# Patient Record
Sex: FEMALE | Race: BLACK OR AFRICAN AMERICAN | NOT HISPANIC OR LATINO | URBAN - METROPOLITAN AREA
[De-identification: names, ages, dates, MRNs, and addresses within clinical notes are randomized per-mention and may not be internally consistent; named-entity substitution may affect disease eponyms.]

---

## 2018-10-31 ENCOUNTER — INPATIENT (INPATIENT)
Facility: HOSPITAL | Age: 63
LOS: 3 days | Discharge: HOME | End: 2018-11-04
Attending: STUDENT IN AN ORGANIZED HEALTH CARE EDUCATION/TRAINING PROGRAM | Admitting: STUDENT IN AN ORGANIZED HEALTH CARE EDUCATION/TRAINING PROGRAM
Payer: COMMERCIAL

## 2018-10-31 VITALS
TEMPERATURE: 98 F | DIASTOLIC BLOOD PRESSURE: 59 MMHG | SYSTOLIC BLOOD PRESSURE: 124 MMHG | RESPIRATION RATE: 17 BRPM | OXYGEN SATURATION: 98 % | HEART RATE: 107 BPM

## 2018-10-31 DIAGNOSIS — K57.20 DIVERTICULITIS OF LARGE INTESTINE WITH PERFORATION AND ABSCESS WITHOUT BLEEDING: ICD-10-CM

## 2018-10-31 DIAGNOSIS — D25.9 LEIOMYOMA OF UTERUS, UNSPECIFIED: Chronic | ICD-10-CM

## 2018-10-31 PROBLEM — Z00.00 ENCOUNTER FOR PREVENTIVE HEALTH EXAMINATION: Status: ACTIVE | Noted: 2018-10-31

## 2018-10-31 LAB
ALBUMIN SERPL ELPH-MCNC: 3.5 G/DL — SIGNIFICANT CHANGE UP (ref 3.3–5)
ALP SERPL-CCNC: 77 U/L — SIGNIFICANT CHANGE UP (ref 40–120)
ALT FLD-CCNC: 18 U/L — SIGNIFICANT CHANGE UP (ref 4–33)
APPEARANCE UR: CLEAR — SIGNIFICANT CHANGE UP
APTT BLD: 27.9 SEC — SIGNIFICANT CHANGE UP (ref 27.5–36.3)
AST SERPL-CCNC: 22 U/L — SIGNIFICANT CHANGE UP (ref 4–32)
BASE EXCESS BLDV CALC-SCNC: 3.2 MMOL/L — SIGNIFICANT CHANGE UP
BASOPHILS # BLD AUTO: 0.07 K/UL — SIGNIFICANT CHANGE UP (ref 0–0.2)
BASOPHILS NFR BLD AUTO: 0.5 % — SIGNIFICANT CHANGE UP (ref 0–2)
BILIRUB SERPL-MCNC: 0.6 MG/DL — SIGNIFICANT CHANGE UP (ref 0.2–1.2)
BILIRUB UR-MCNC: NEGATIVE — SIGNIFICANT CHANGE UP
BLD GP AB SCN SERPL QL: NEGATIVE — SIGNIFICANT CHANGE UP
BLOOD GAS VENOUS - CREATININE: 0.99 MG/DL — SIGNIFICANT CHANGE UP (ref 0.5–1.3)
BLOOD UR QL VISUAL: SIGNIFICANT CHANGE UP
BUN SERPL-MCNC: 11 MG/DL — SIGNIFICANT CHANGE UP (ref 7–23)
CALCIUM SERPL-MCNC: 9.5 MG/DL — SIGNIFICANT CHANGE UP (ref 8.4–10.5)
CHLORIDE BLDV-SCNC: 97 MMOL/L — SIGNIFICANT CHANGE UP (ref 96–108)
CHLORIDE SERPL-SCNC: 92 MMOL/L — LOW (ref 98–107)
CO2 SERPL-SCNC: 25 MMOL/L — SIGNIFICANT CHANGE UP (ref 22–31)
COLOR SPEC: SIGNIFICANT CHANGE UP
CREAT SERPL-MCNC: 1.01 MG/DL — SIGNIFICANT CHANGE UP (ref 0.5–1.3)
EOSINOPHIL # BLD AUTO: 0.03 K/UL — SIGNIFICANT CHANGE UP (ref 0–0.5)
EOSINOPHIL NFR BLD AUTO: 0.2 % — SIGNIFICANT CHANGE UP (ref 0–6)
GAS PNL BLDV: 134 MMOL/L — LOW (ref 136–146)
GLUCOSE BLDV-MCNC: 105 — HIGH (ref 70–99)
GLUCOSE SERPL-MCNC: 100 MG/DL — HIGH (ref 70–99)
GLUCOSE UR-MCNC: NEGATIVE — SIGNIFICANT CHANGE UP
HCO3 BLDV-SCNC: 27 MMOL/L — SIGNIFICANT CHANGE UP (ref 20–27)
HCT VFR BLD CALC: 31 % — LOW (ref 34.5–45)
HCT VFR BLDV CALC: 30.9 % — LOW (ref 34.5–45)
HGB BLD-MCNC: 9.8 G/DL — LOW (ref 11.5–15.5)
HGB BLDV-MCNC: 10 G/DL — LOW (ref 11.5–15.5)
IMM GRANULOCYTES # BLD AUTO: 0.11 # — SIGNIFICANT CHANGE UP
IMM GRANULOCYTES NFR BLD AUTO: 0.8 % — SIGNIFICANT CHANGE UP (ref 0–1.5)
INR BLD: 1.26 — HIGH (ref 0.88–1.17)
KETONES UR-MCNC: SIGNIFICANT CHANGE UP
LACTATE BLDV-MCNC: 1.7 MMOL/L — SIGNIFICANT CHANGE UP (ref 0.5–2)
LEUKOCYTE ESTERASE UR-ACNC: NEGATIVE — SIGNIFICANT CHANGE UP
LYMPHOCYTES # BLD AUTO: 1.9 K/UL — SIGNIFICANT CHANGE UP (ref 1–3.3)
LYMPHOCYTES # BLD AUTO: 14.4 % — SIGNIFICANT CHANGE UP (ref 13–44)
MCHC RBC-ENTMCNC: 26.3 PG — LOW (ref 27–34)
MCHC RBC-ENTMCNC: 31.6 % — LOW (ref 32–36)
MCV RBC AUTO: 83.1 FL — SIGNIFICANT CHANGE UP (ref 80–100)
MONOCYTES # BLD AUTO: 1.42 K/UL — HIGH (ref 0–0.9)
MONOCYTES NFR BLD AUTO: 10.8 % — SIGNIFICANT CHANGE UP (ref 2–14)
NEUTROPHILS # BLD AUTO: 9.67 K/UL — HIGH (ref 1.8–7.4)
NEUTROPHILS NFR BLD AUTO: 73.3 % — SIGNIFICANT CHANGE UP (ref 43–77)
NITRITE UR-MCNC: NEGATIVE — SIGNIFICANT CHANGE UP
NRBC # FLD: 0 — SIGNIFICANT CHANGE UP
PCO2 BLDV: 43 MMHG — SIGNIFICANT CHANGE UP (ref 41–51)
PH BLDV: 7.42 PH — SIGNIFICANT CHANGE UP (ref 7.32–7.43)
PH UR: 7 — SIGNIFICANT CHANGE UP (ref 5–8)
PLATELET # BLD AUTO: 393 K/UL — SIGNIFICANT CHANGE UP (ref 150–400)
PMV BLD: 9.4 FL — SIGNIFICANT CHANGE UP (ref 7–13)
PO2 BLDV: 44 MMHG — HIGH (ref 35–40)
POTASSIUM BLDV-SCNC: 3.3 MMOL/L — LOW (ref 3.4–4.5)
POTASSIUM SERPL-MCNC: 4 MMOL/L — SIGNIFICANT CHANGE UP (ref 3.5–5.3)
POTASSIUM SERPL-SCNC: 4 MMOL/L — SIGNIFICANT CHANGE UP (ref 3.5–5.3)
PROT SERPL-MCNC: 8.1 G/DL — SIGNIFICANT CHANGE UP (ref 6–8.3)
PROT UR-MCNC: NEGATIVE — SIGNIFICANT CHANGE UP
PROTHROM AB SERPL-ACNC: 14.1 SEC — HIGH (ref 9.8–13.1)
RBC # BLD: 3.73 M/UL — LOW (ref 3.8–5.2)
RBC # FLD: 15 % — HIGH (ref 10.3–14.5)
RH IG SCN BLD-IMP: POSITIVE — SIGNIFICANT CHANGE UP
SAO2 % BLDV: 75.2 % — SIGNIFICANT CHANGE UP (ref 60–85)
SODIUM SERPL-SCNC: 135 MMOL/L — SIGNIFICANT CHANGE UP (ref 135–145)
SP GR SPEC: 1.01 — SIGNIFICANT CHANGE UP (ref 1–1.04)
UROBILINOGEN FLD QL: NORMAL — SIGNIFICANT CHANGE UP
WBC # BLD: 13.2 K/UL — HIGH (ref 3.8–10.5)
WBC # FLD AUTO: 13.2 K/UL — HIGH (ref 3.8–10.5)

## 2018-10-31 RX ORDER — SODIUM CHLORIDE 9 MG/ML
1000 INJECTION, SOLUTION INTRAVENOUS
Qty: 0 | Refills: 0 | Status: DISCONTINUED | OUTPATIENT
Start: 2018-10-31 | End: 2018-11-01

## 2018-10-31 RX ORDER — METOPROLOL TARTRATE 50 MG
5 TABLET ORAL EVERY 6 HOURS
Qty: 0 | Refills: 0 | Status: DISCONTINUED | OUTPATIENT
Start: 2018-10-31 | End: 2018-11-03

## 2018-10-31 RX ORDER — CIPROFLOXACIN LACTATE 400MG/40ML
400 VIAL (ML) INTRAVENOUS ONCE
Qty: 0 | Refills: 0 | Status: COMPLETED | OUTPATIENT
Start: 2018-10-31 | End: 2018-10-31

## 2018-10-31 RX ORDER — PIPERACILLIN AND TAZOBACTAM 4; .5 G/20ML; G/20ML
3.38 INJECTION, POWDER, LYOPHILIZED, FOR SOLUTION INTRAVENOUS EVERY 8 HOURS
Qty: 0 | Refills: 0 | Status: DISCONTINUED | OUTPATIENT
Start: 2018-10-31 | End: 2018-11-04

## 2018-10-31 RX ORDER — METRONIDAZOLE 500 MG
500 TABLET ORAL ONCE
Qty: 0 | Refills: 0 | Status: COMPLETED | OUTPATIENT
Start: 2018-10-31 | End: 2018-10-31

## 2018-10-31 RX ORDER — ENOXAPARIN SODIUM 100 MG/ML
40 INJECTION SUBCUTANEOUS DAILY
Qty: 0 | Refills: 0 | Status: DISCONTINUED | OUTPATIENT
Start: 2018-10-31 | End: 2018-11-04

## 2018-10-31 RX ORDER — ACETAMINOPHEN 500 MG
1000 TABLET ORAL ONCE
Qty: 0 | Refills: 0 | Status: COMPLETED | OUTPATIENT
Start: 2018-10-31 | End: 2018-10-31

## 2018-10-31 RX ORDER — SODIUM CHLORIDE 9 MG/ML
1000 INJECTION INTRAMUSCULAR; INTRAVENOUS; SUBCUTANEOUS ONCE
Qty: 0 | Refills: 0 | Status: COMPLETED | OUTPATIENT
Start: 2018-10-31 | End: 2018-10-31

## 2018-10-31 RX ADMIN — SODIUM CHLORIDE 1000 MILLILITER(S): 9 INJECTION INTRAMUSCULAR; INTRAVENOUS; SUBCUTANEOUS at 18:39

## 2018-10-31 RX ADMIN — Medication 100 MILLIGRAM(S): at 20:02

## 2018-10-31 RX ADMIN — Medication 5 MILLIGRAM(S): at 23:56

## 2018-10-31 RX ADMIN — Medication 1000 MILLIGRAM(S): at 23:40

## 2018-10-31 RX ADMIN — SODIUM CHLORIDE 125 MILLILITER(S): 9 INJECTION, SOLUTION INTRAVENOUS at 23:55

## 2018-10-31 RX ADMIN — Medication 400 MILLIGRAM(S): at 23:15

## 2018-10-31 RX ADMIN — PIPERACILLIN AND TAZOBACTAM 25 GRAM(S): 4; .5 INJECTION, POWDER, LYOPHILIZED, FOR SOLUTION INTRAVENOUS at 23:56

## 2018-10-31 RX ADMIN — Medication 200 MILLIGRAM(S): at 18:39

## 2018-10-31 NOTE — ED PROVIDER NOTE - ATTENDING CONTRIBUTION TO CARE
Attending note:   After face to face evaluation of this patient, I concur with above noted hx, pe, and care plan for this patient.  64 y/o F with known diverticulosis with months of low grade fevers with abx treatement, now with llq pain and perf'd tic noted on outside ct today.  Evaluation in progress

## 2018-10-31 NOTE — H&P ADULT - ASSESSMENT
Assessment: Patient is a 63 year old lady with medical history significant for diverticulitis, presents with abdominal pain, with a fever, mild abdominal tenderness, mild WBC elevation and CT scan concerning for diverticulitis Hinchey 2.  - Admit the patient to acute care surgery (B team) under the care of Dr. Alcira Gutierrez   -  Nil Per Os  -  Lactated ringers as maintenance fluids   - IV antibiotics to cover anaerobes and gram negative rods, and pain medication as needed.  - Discussed with Dr. Alcira Gutierrez   [id number] Assessment: Patient is a 63 year old lady with medical history significant for diverticulitis, presents with abdominal pain, with a fever, mild abdominal tenderness, mild WBC elevation and CT scan concerning for diverticulitis Hinchey 2.  - Admit the patient to acute care surgery (B team) under the care of Dr. Alcira Gutierrez   -  Nil Per Os  -  Lactated ringers as resuscitative fluids   - IV antibiotics to cover anaerobes and gram negative rods, and pain medication as needed.  - Discussed with Dr. Alcira Gutierrez   [id number]

## 2018-10-31 NOTE — ED ADULT TRIAGE NOTE - CHIEF COMPLAINT QUOTE
Pt c/o sent in by PMD for perforated diverticulitis shown on CT done this morning.  Pt c/o LLQ pain intermittent since May with nausea and diarrhea.  States completed two rounds of antibiotics for the diverticulitis since then without improvement.  Pt appears comfortable at present.  PMHx:  diverticulitis, HTN

## 2018-10-31 NOTE — ED PROVIDER NOTE - OBJECTIVE STATEMENT
63 y.o female pmhx of HTN and diverticulitis coming in with intermittent abdominal pain on and off since May- started on abx in august and just finished a course last week of cipro/flagyl for presumed diverticulitis. went to her Pmd today for worsening pain over the last few days, and episode of nonbloody diarrhea yesterday, had an outpatient CT done showing that she had perforated diverticulitis and was sent to the ED for further evaluation. Pt denies fevers, chills, chest pain, SOB, cough, n/v/ , numbness, tingling, weakness, urinary symptoms. Hx Fibroidectomy.

## 2018-10-31 NOTE — ED PROVIDER NOTE - MEDICAL DECISION MAKING DETAILS
63 y.o female pmhx of HTN and diverticulitis coming in with intermittent abdominal pain on and off since May- had CT done outpatient showing perforated diverticulitis- sent to ed for further eval. will obtain labs, give abx, Surgery consult, contact outpatient imaging, admit.

## 2018-10-31 NOTE — H&P ADULT - NSHPLABSRESULTS_GEN_ALL_CORE
LABS:                          9.8    13.20 )-----------( 393      ( 31 Oct 2018 17:47 )             31.0       10-31    135  |  92<L>  |  11  ----------------------------<  100<H>  4.0   |  25  |  1.01    Ca    9.5      31 Oct 2018 17:47    TPro  8.1  /  Alb  3.5  /  TBili  0.6  /  DBili  x   /  AST  22  /  ALT  18  /  AlkPhos  77  10-31    PT/INR - ( 31 Oct 2018 17:47 )   PT: 14.1 SEC;   INR: 1.26          PTT - ( 31 Oct 2018 17:47 )  PTT:27.9 SEC      RADIOLOGY & ADDITIONAL STUDIES:   Report (CT scan abd/pelvis): perforated proximal to mid sigmoid diverticulitis with extensive phlegmonous changes surrounding the proximal sigmoid colon and extending down into the pelvis along the uterine surface. No drainable abscess collection

## 2018-10-31 NOTE — H&P ADULT - HISTORY OF PRESENT ILLNESS
63 year old lady with medical history significant for diverticulitis and hypertension who was referred from outpatient radiology with CT scan read as perforated diverticulitis.   HPI:  Patient reports 4 months of intermittent periods of lower abdominal pain. Pain is described as intermittent 1/10 non-radiating. Pain is worse with oral intake. Associated with fever, vomiting, diarrhea and weakness. No SOB or chest pain. Patient had a colonoscopy in the last year that found a benign polyp and was told that she still needs follow up. Last PO intake >24hrs ago.  PSH: History of a Fibroidectomy 20 years ago.   Allergies: No allergies.   Medications: Takes Labetalol 100mg 2x daily, Fluvastatin 80mg daily, losartan, Indapamide 2.5mg daily and potassium 10meq daily.   Social History: She denies smoking and alcohol use. Works in customer service at [employer].  Physical Exam:   On exam patients patient was Febrile to 101.3 HR-107, /59 RR-18 sat at 98% on room air.   General: no acute distress  Resp: CTA b/l   CV: RRR  Abd: soft, obese, mildly focally tender to palpation in LLQ, no generalized peritonitis.   Ext: warm   Labs: WBC elevated to 13.20 H/H – 9.8/31.0 lactate - 1.7

## 2018-10-31 NOTE — ED ADULT NURSE NOTE - OBJECTIVE STATEMENT
Pt w/ hx of diverticulitis received to rm 11 aaox4 ambulatory sent by pmd for ct scan showing perforated diverticulitis Pt reports pain with BM x 1 week w/ associated L.sided abdominal distention.  Pt denies NVD fevers chills.  Pt p/w NAD breaths even unlabored skin warm dry intact.  WIll obtain IV access and labs as ordered.

## 2018-10-31 NOTE — ED ADULT NURSE NOTE - NSIMPLEMENTINTERV_GEN_ALL_ED
Implemented All Universal Safety Interventions:  [place] to call system. Call bell, personal items and telephone within reach. Instruct patient to call for assistance. Room bathroom lighting operational. Non-slip footwear when patient is off stretcher. Physically safe environment: no spills, clutter or unnecessary equipment. Stretcher in lowest position, wheels locked, appropriate side rails in place.

## 2018-10-31 NOTE — ED ADULT NURSE REASSESSMENT NOTE - NS ED NURSE REASSESS COMMENT FT1
Patient resting in bed, appears comfortable and in no apparent distress.  Denies any pain at this time.  Tylenol IV given for temp and vitals taken.  Will reassess patient.

## 2018-10-31 NOTE — ED PROVIDER NOTE - PROGRESS NOTE DETAILS
dwain posada: spoke to outpatient sharon chen with patients consent to have imaging reports faxed over. Read is showing " perforated proximal to mid sigmoid diverticulitis with extensive phlegmonous changes surrounding the proximal sigmoid colon and extending down into the pelvis along uterine surface. No drainable collection." Surgery was consulted, coming to see patient.

## 2018-11-01 LAB
BUN SERPL-MCNC: 9 MG/DL — SIGNIFICANT CHANGE UP (ref 7–23)
CALCIUM SERPL-MCNC: 9 MG/DL — SIGNIFICANT CHANGE UP (ref 8.4–10.5)
CHLORIDE SERPL-SCNC: 98 MMOL/L — SIGNIFICANT CHANGE UP (ref 98–107)
CO2 SERPL-SCNC: 24 MMOL/L — SIGNIFICANT CHANGE UP (ref 22–31)
CREAT SERPL-MCNC: 0.98 MG/DL — SIGNIFICANT CHANGE UP (ref 0.5–1.3)
GLUCOSE SERPL-MCNC: 102 MG/DL — HIGH (ref 70–99)
HCT VFR BLD CALC: 28 % — LOW (ref 34.5–45)
HGB BLD-MCNC: 8.5 G/DL — LOW (ref 11.5–15.5)
MCHC RBC-ENTMCNC: 25.7 PG — LOW (ref 27–34)
MCHC RBC-ENTMCNC: 30.4 % — LOW (ref 32–36)
MCV RBC AUTO: 84.6 FL — SIGNIFICANT CHANGE UP (ref 80–100)
NRBC # FLD: 0 — SIGNIFICANT CHANGE UP
PLATELET # BLD AUTO: 308 K/UL — SIGNIFICANT CHANGE UP (ref 150–400)
PMV BLD: 9.6 FL — SIGNIFICANT CHANGE UP (ref 7–13)
POTASSIUM SERPL-MCNC: 3.6 MMOL/L — SIGNIFICANT CHANGE UP (ref 3.5–5.3)
POTASSIUM SERPL-SCNC: 3.6 MMOL/L — SIGNIFICANT CHANGE UP (ref 3.5–5.3)
RBC # BLD: 3.31 M/UL — LOW (ref 3.8–5.2)
RBC # FLD: 14.9 % — HIGH (ref 10.3–14.5)
SODIUM SERPL-SCNC: 137 MMOL/L — SIGNIFICANT CHANGE UP (ref 135–145)
SPECIMEN SOURCE: SIGNIFICANT CHANGE UP
SPECIMEN SOURCE: SIGNIFICANT CHANGE UP
WBC # BLD: 8.74 K/UL — SIGNIFICANT CHANGE UP (ref 3.8–10.5)
WBC # FLD AUTO: 8.74 K/UL — SIGNIFICANT CHANGE UP (ref 3.8–10.5)

## 2018-11-01 RX ORDER — DEXTROSE MONOHYDRATE, SODIUM CHLORIDE, AND POTASSIUM CHLORIDE 50; .745; 4.5 G/1000ML; G/1000ML; G/1000ML
1000 INJECTION, SOLUTION INTRAVENOUS
Qty: 0 | Refills: 0 | Status: DISCONTINUED | OUTPATIENT
Start: 2018-11-01 | End: 2018-11-02

## 2018-11-01 RX ORDER — ONDANSETRON 8 MG/1
4 TABLET, FILM COATED ORAL ONCE
Qty: 0 | Refills: 0 | Status: DISCONTINUED | OUTPATIENT
Start: 2018-11-01 | End: 2018-11-04

## 2018-11-01 RX ORDER — POTASSIUM CHLORIDE 20 MEQ
10 PACKET (EA) ORAL
Qty: 0 | Refills: 0 | Status: COMPLETED | OUTPATIENT
Start: 2018-11-01 | End: 2018-11-01

## 2018-11-01 RX ORDER — ACETAMINOPHEN 500 MG
1000 TABLET ORAL ONCE
Qty: 0 | Refills: 0 | Status: COMPLETED | OUTPATIENT
Start: 2018-11-01 | End: 2018-11-01

## 2018-11-01 RX ORDER — INFLUENZA VIRUS VACCINE 15; 15; 15; 15 UG/.5ML; UG/.5ML; UG/.5ML; UG/.5ML
0.5 SUSPENSION INTRAMUSCULAR ONCE
Qty: 0 | Refills: 0 | Status: COMPLETED | OUTPATIENT
Start: 2018-11-01 | End: 2018-11-04

## 2018-11-01 RX ADMIN — Medication 100 MILLIEQUIVALENT(S): at 14:28

## 2018-11-01 RX ADMIN — Medication 5 MILLIGRAM(S): at 17:56

## 2018-11-01 RX ADMIN — DEXTROSE MONOHYDRATE, SODIUM CHLORIDE, AND POTASSIUM CHLORIDE 125 MILLILITER(S): 50; .745; 4.5 INJECTION, SOLUTION INTRAVENOUS at 02:39

## 2018-11-01 RX ADMIN — DEXTROSE MONOHYDRATE, SODIUM CHLORIDE, AND POTASSIUM CHLORIDE 125 MILLILITER(S): 50; .745; 4.5 INJECTION, SOLUTION INTRAVENOUS at 07:34

## 2018-11-01 RX ADMIN — Medication 100 MILLIEQUIVALENT(S): at 09:53

## 2018-11-01 RX ADMIN — PIPERACILLIN AND TAZOBACTAM 25 GRAM(S): 4; .5 INJECTION, POWDER, LYOPHILIZED, FOR SOLUTION INTRAVENOUS at 07:34

## 2018-11-01 RX ADMIN — ENOXAPARIN SODIUM 40 MILLIGRAM(S): 100 INJECTION SUBCUTANEOUS at 12:12

## 2018-11-01 RX ADMIN — Medication 1000 MILLIGRAM(S): at 15:30

## 2018-11-01 RX ADMIN — Medication 5 MILLIGRAM(S): at 12:32

## 2018-11-01 RX ADMIN — Medication 400 MILLIGRAM(S): at 14:51

## 2018-11-01 RX ADMIN — PIPERACILLIN AND TAZOBACTAM 25 GRAM(S): 4; .5 INJECTION, POWDER, LYOPHILIZED, FOR SOLUTION INTRAVENOUS at 16:48

## 2018-11-01 RX ADMIN — Medication 100 MILLIEQUIVALENT(S): at 11:58

## 2018-11-01 NOTE — PROVIDER CONTACT NOTE (OTHER) - REASON
pt states she feels nauseous and had small episode of scant clear vomit, can order IV anti-nausea med?

## 2018-11-01 NOTE — PATIENT PROFILE ADULT - VISION (WITH CORRECTIVE LENSES IF THE PATIENT USUALLY WEARS THEM):
Normal vision: sees adequately in most situations; can see medication labels, newsprint Pt wears glasses./Partially impaired: cannot see medication labels or newsprint, but can see obstacles in path, and the surrounding layout; can count fingers at arm's length

## 2018-11-02 PROCEDURE — 99233 SBSQ HOSP IP/OBS HIGH 50: CPT

## 2018-11-02 RX ORDER — DEXTROSE MONOHYDRATE, SODIUM CHLORIDE, AND POTASSIUM CHLORIDE 50; .745; 4.5 G/1000ML; G/1000ML; G/1000ML
1000 INJECTION, SOLUTION INTRAVENOUS
Qty: 0 | Refills: 0 | Status: DISCONTINUED | OUTPATIENT
Start: 2018-11-02 | End: 2018-11-03

## 2018-11-02 RX ORDER — POTASSIUM CHLORIDE 20 MEQ
10 PACKET (EA) ORAL
Qty: 0 | Refills: 0 | Status: COMPLETED | OUTPATIENT
Start: 2018-11-02 | End: 2018-11-02

## 2018-11-02 RX ADMIN — DEXTROSE MONOHYDRATE, SODIUM CHLORIDE, AND POTASSIUM CHLORIDE 125 MILLILITER(S): 50; .745; 4.5 INJECTION, SOLUTION INTRAVENOUS at 11:14

## 2018-11-02 RX ADMIN — PIPERACILLIN AND TAZOBACTAM 25 GRAM(S): 4; .5 INJECTION, POWDER, LYOPHILIZED, FOR SOLUTION INTRAVENOUS at 00:57

## 2018-11-02 RX ADMIN — PIPERACILLIN AND TAZOBACTAM 25 GRAM(S): 4; .5 INJECTION, POWDER, LYOPHILIZED, FOR SOLUTION INTRAVENOUS at 15:12

## 2018-11-02 RX ADMIN — Medication 100 MILLIEQUIVALENT(S): at 19:02

## 2018-11-02 RX ADMIN — DEXTROSE MONOHYDRATE, SODIUM CHLORIDE, AND POTASSIUM CHLORIDE 75 MILLILITER(S): 50; .745; 4.5 INJECTION, SOLUTION INTRAVENOUS at 17:40

## 2018-11-02 RX ADMIN — Medication 5 MILLIGRAM(S): at 11:14

## 2018-11-02 RX ADMIN — DEXTROSE MONOHYDRATE, SODIUM CHLORIDE, AND POTASSIUM CHLORIDE 125 MILLILITER(S): 50; .745; 4.5 INJECTION, SOLUTION INTRAVENOUS at 00:57

## 2018-11-02 RX ADMIN — Medication 100 MILLIEQUIVALENT(S): at 21:47

## 2018-11-02 RX ADMIN — ENOXAPARIN SODIUM 40 MILLIGRAM(S): 100 INJECTION SUBCUTANEOUS at 11:27

## 2018-11-02 RX ADMIN — PIPERACILLIN AND TAZOBACTAM 25 GRAM(S): 4; .5 INJECTION, POWDER, LYOPHILIZED, FOR SOLUTION INTRAVENOUS at 21:15

## 2018-11-02 RX ADMIN — Medication 100 MILLIEQUIVALENT(S): at 21:15

## 2018-11-02 RX ADMIN — Medication 5 MILLIGRAM(S): at 23:22

## 2018-11-02 RX ADMIN — DEXTROSE MONOHYDRATE, SODIUM CHLORIDE, AND POTASSIUM CHLORIDE 75 MILLILITER(S): 50; .745; 4.5 INJECTION, SOLUTION INTRAVENOUS at 21:15

## 2018-11-02 RX ADMIN — Medication 5 MILLIGRAM(S): at 05:14

## 2018-11-02 RX ADMIN — Medication 5 MILLIGRAM(S): at 00:56

## 2018-11-02 RX ADMIN — PIPERACILLIN AND TAZOBACTAM 25 GRAM(S): 4; .5 INJECTION, POWDER, LYOPHILIZED, FOR SOLUTION INTRAVENOUS at 08:11

## 2018-11-02 RX ADMIN — Medication 5 MILLIGRAM(S): at 17:40

## 2018-11-02 RX ADMIN — DEXTROSE MONOHYDRATE, SODIUM CHLORIDE, AND POTASSIUM CHLORIDE 125 MILLILITER(S): 50; .745; 4.5 INJECTION, SOLUTION INTRAVENOUS at 05:14

## 2018-11-02 NOTE — PROGRESS NOTE ADULT - ASSESSMENT
63F presenting w/1wk abd pain + fever, w/ CT confirmed diverticulitis Hinchey 2    IVF   IV ABX  Pain control

## 2018-11-02 NOTE — PROGRESS NOTE ADULT - SUBJECTIVE AND OBJECTIVE BOX
Progress note surgery A team     Pt was seen and examined this morning. Lower abdominal pain. no nausea or vomiting.       Vital Signs Last 24 Hrs  T(C): 37.2 (2018 05:13), Max: 38.2 (2018 13:07)  T(F): 98.9 (2018 05:13), Max: 100.8 (2018 13:07)  HR: 85 (2018 05:13) (80 - 96)  BP: 136/60 (2018 05:13) (113/57 - 137/73)  BP(mean): --  RR: 19 (2018 05:13) (18 - 20)  SpO2: 100% (2018 05:13) (97% - 100%)    I&O's Detail    2018 07:01  -  2018 07:00  --------------------------------------------------------  IN:    IV PiggyBack: 600 mL    sodium chloride 0.9% with potassium chloride 20 mEq/L: 3125 mL  Total IN: 3725 mL    OUT:    Voided: 2150 mL  Total OUT: 2150 mL    Total NET: 1575 mL          MEDICATIONS  (STANDING):  enoxaparin Injectable 40 milliGRAM(s) SubCutaneous daily  influenza   Vaccine 0.5 milliLiter(s) IntraMuscular once  metoprolol tartrate Injectable 5 milliGRAM(s) IV Push every 6 hours  ondansetron Injectable 4 milliGRAM(s) IV Push once  piperacillin/tazobactam IVPB. 3.375 Gram(s) IV Intermittent every 8 hours  sodium chloride 0.9% with potassium chloride 20 mEq/L 1000 milliLiter(s) (125 mL/Hr) IV Continuous <Continuous>    MEDICATIONS  (PRN):      LABS:                        8.5    8.74  )-----------( 308      ( 2018 05:30 )             28.0         137  |  98  |  9   ----------------------------<  102<H>  3.6   |  24  |  0.98    Ca    9.0      2018 05:30    TPro  8.1  /  Alb  3.5  /  TBili  0.6  /  DBili  x   /  AST  22  /  ALT  18  /  AlkPhos  77  10-31    PT/INR - ( 31 Oct 2018 17:47 )   PT: 14.1 SEC;   INR: 1.26          PTT - ( 31 Oct 2018 17:47 )  PTT:27.9 SEC  Urinalysis Basic - ( 31 Oct 2018 17:45 )    Color: LIGHT YELLOW / Appearance: CLEAR / S.008 / pH: 7.0  Gluc: NEGATIVE / Ketone: TRACE  / Bili: NEGATIVE / Urobili: NORMAL   Blood: TRACE / Protein: NEGATIVE / Nitrite: NEGATIVE   Leuk Esterase: NEGATIVE / RBC: x / WBC x   Sq Epi: x / Non Sq Epi: x / Bacteria: x      LIVER FUNCTIONS - ( 31 Oct 2018 17:47 )  Alb: 3.5 g/dL / Pro: 8.1 g/dL / ALK PHOS: 77 u/L / ALT: 18 u/L / AST: 22 u/L / GGT: x           Physical exam     General: no acute distress  Resp: non labored breathing   Abd. soft lower abd tenderness   Neuro: alert and oriented x 3

## 2018-11-03 LAB
ALBUMIN SERPL ELPH-MCNC: 3.3 G/DL — SIGNIFICANT CHANGE UP (ref 3.3–5)
ALP SERPL-CCNC: 80 U/L — SIGNIFICANT CHANGE UP (ref 40–120)
ALT FLD-CCNC: 9 U/L — SIGNIFICANT CHANGE UP (ref 4–33)
AST SERPL-CCNC: 10 U/L — SIGNIFICANT CHANGE UP (ref 4–32)
BILIRUB SERPL-MCNC: 0.4 MG/DL — SIGNIFICANT CHANGE UP (ref 0.2–1.2)
BUN SERPL-MCNC: 6 MG/DL — LOW (ref 7–23)
CALCIUM SERPL-MCNC: 9.1 MG/DL — SIGNIFICANT CHANGE UP (ref 8.4–10.5)
CHLORIDE SERPL-SCNC: 100 MMOL/L — SIGNIFICANT CHANGE UP (ref 98–107)
CO2 SERPL-SCNC: 24 MMOL/L — SIGNIFICANT CHANGE UP (ref 22–31)
CREAT SERPL-MCNC: 0.86 MG/DL — SIGNIFICANT CHANGE UP (ref 0.5–1.3)
GLUCOSE SERPL-MCNC: 100 MG/DL — HIGH (ref 70–99)
HCT VFR BLD CALC: 29.4 % — LOW (ref 34.5–45)
HGB BLD-MCNC: 9 G/DL — LOW (ref 11.5–15.5)
MAGNESIUM SERPL-MCNC: 1.7 MG/DL — SIGNIFICANT CHANGE UP (ref 1.6–2.6)
MCHC RBC-ENTMCNC: 25.4 PG — LOW (ref 27–34)
MCHC RBC-ENTMCNC: 30.6 % — LOW (ref 32–36)
MCV RBC AUTO: 83.1 FL — SIGNIFICANT CHANGE UP (ref 80–100)
NRBC # FLD: 0 — SIGNIFICANT CHANGE UP
PHOSPHATE SERPL-MCNC: 3.2 MG/DL — SIGNIFICANT CHANGE UP (ref 2.5–4.5)
PLATELET # BLD AUTO: 364 K/UL — SIGNIFICANT CHANGE UP (ref 150–400)
PMV BLD: 9.2 FL — SIGNIFICANT CHANGE UP (ref 7–13)
POTASSIUM SERPL-MCNC: 4.2 MMOL/L — SIGNIFICANT CHANGE UP (ref 3.5–5.3)
POTASSIUM SERPL-SCNC: 4.2 MMOL/L — SIGNIFICANT CHANGE UP (ref 3.5–5.3)
PROT SERPL-MCNC: 7.4 G/DL — SIGNIFICANT CHANGE UP (ref 6–8.3)
RBC # BLD: 3.54 M/UL — LOW (ref 3.8–5.2)
RBC # FLD: 14.8 % — HIGH (ref 10.3–14.5)
SODIUM SERPL-SCNC: 139 MMOL/L — SIGNIFICANT CHANGE UP (ref 135–145)
WBC # BLD: 6.4 K/UL — SIGNIFICANT CHANGE UP (ref 3.8–10.5)
WBC # FLD AUTO: 6.4 K/UL — SIGNIFICANT CHANGE UP (ref 3.8–10.5)

## 2018-11-03 RX ORDER — HYDROCHLOROTHIAZIDE 25 MG
12.5 TABLET ORAL ONCE
Qty: 0 | Refills: 0 | Status: COMPLETED | OUTPATIENT
Start: 2018-11-03 | End: 2018-11-03

## 2018-11-03 RX ORDER — ATORVASTATIN CALCIUM 80 MG/1
10 TABLET, FILM COATED ORAL AT BEDTIME
Qty: 0 | Refills: 0 | Status: DISCONTINUED | OUTPATIENT
Start: 2018-11-03 | End: 2018-11-04

## 2018-11-03 RX ORDER — LABETALOL HCL 100 MG
100 TABLET ORAL
Qty: 0 | Refills: 0 | Status: DISCONTINUED | OUTPATIENT
Start: 2018-11-03 | End: 2018-11-04

## 2018-11-03 RX ORDER — POTASSIUM CHLORIDE 20 MEQ
10 PACKET (EA) ORAL DAILY
Qty: 0 | Refills: 0 | Status: DISCONTINUED | OUTPATIENT
Start: 2018-11-03 | End: 2018-11-04

## 2018-11-03 RX ORDER — DEXTROSE MONOHYDRATE, SODIUM CHLORIDE, AND POTASSIUM CHLORIDE 50; .745; 4.5 G/1000ML; G/1000ML; G/1000ML
1000 INJECTION, SOLUTION INTRAVENOUS
Qty: 0 | Refills: 0 | Status: DISCONTINUED | OUTPATIENT
Start: 2018-11-03 | End: 2018-11-04

## 2018-11-03 RX ORDER — MAGNESIUM SULFATE 500 MG/ML
2 VIAL (ML) INJECTION ONCE
Qty: 0 | Refills: 0 | Status: COMPLETED | OUTPATIENT
Start: 2018-11-03 | End: 2018-11-03

## 2018-11-03 RX ORDER — DEXTROSE MONOHYDRATE, SODIUM CHLORIDE, AND POTASSIUM CHLORIDE 50; .745; 4.5 G/1000ML; G/1000ML; G/1000ML
1000 INJECTION, SOLUTION INTRAVENOUS
Qty: 0 | Refills: 0 | Status: DISCONTINUED | OUTPATIENT
Start: 2018-11-03 | End: 2018-11-03

## 2018-11-03 RX ADMIN — PIPERACILLIN AND TAZOBACTAM 25 GRAM(S): 4; .5 INJECTION, POWDER, LYOPHILIZED, FOR SOLUTION INTRAVENOUS at 05:04

## 2018-11-03 RX ADMIN — Medication 100 MILLIGRAM(S): at 19:04

## 2018-11-03 RX ADMIN — ATORVASTATIN CALCIUM 10 MILLIGRAM(S): 80 TABLET, FILM COATED ORAL at 21:13

## 2018-11-03 RX ADMIN — Medication 5 MILLIGRAM(S): at 14:07

## 2018-11-03 RX ADMIN — DEXTROSE MONOHYDRATE, SODIUM CHLORIDE, AND POTASSIUM CHLORIDE 50 MILLILITER(S): 50; .745; 4.5 INJECTION, SOLUTION INTRAVENOUS at 21:14

## 2018-11-03 RX ADMIN — ENOXAPARIN SODIUM 40 MILLIGRAM(S): 100 INJECTION SUBCUTANEOUS at 14:06

## 2018-11-03 RX ADMIN — Medication 5 MILLIGRAM(S): at 05:04

## 2018-11-03 RX ADMIN — Medication 50 GRAM(S): at 19:04

## 2018-11-03 RX ADMIN — PIPERACILLIN AND TAZOBACTAM 25 GRAM(S): 4; .5 INJECTION, POWDER, LYOPHILIZED, FOR SOLUTION INTRAVENOUS at 21:13

## 2018-11-03 RX ADMIN — PIPERACILLIN AND TAZOBACTAM 25 GRAM(S): 4; .5 INJECTION, POWDER, LYOPHILIZED, FOR SOLUTION INTRAVENOUS at 14:16

## 2018-11-03 NOTE — PROGRESS NOTE ADULT - ASSESSMENT
63F presenting w/1wk abd pain + fever, w/ CT confirmed diverticulitis Hinchey 2    IVF   IV ABX  Pain control 63F presenting w/ 1wk abd pain + fever, w/ CT confirmed diverticulitis Hinchey 2    Obtain outpatient CT from [facility]  CLD/IVL  IV Abx  Pain control   DVT ppx

## 2018-11-03 NOTE — PROGRESS NOTE ADULT - SUBJECTIVE AND OBJECTIVE BOX
Progress note surgery A team     Pt was seen and examined this morning. Lower abdominal pain. no nausea or vomiting.         Vital Signs Last 24 Hrs  T(C): 37 (02 Nov 2018 23:55), Max: 37.2 (02 Nov 2018 05:13)  T(F): 98.6 (02 Nov 2018 23:55), Max: 98.9 (02 Nov 2018 05:13)  HR: 77 (02 Nov 2018 23:55) (74 - 85)  BP: 125/64 (02 Nov 2018 23:55) (121/63 - 136/60)  BP(mean): --  RR: 17 (02 Nov 2018 23:55) (16 - 19)  SpO2: 96% (02 Nov 2018 23:55) (96% - 100%)    I&O's Detail    01 Nov 2018 07:01  -  02 Nov 2018 07:00  --------------------------------------------------------  IN:    IV PiggyBack: 600 mL    sodium chloride 0.9% with potassium chloride 20 mEq/L: 3125 mL  Total IN: 3725 mL    OUT:    Voided: 2150 mL  Total OUT: 2150 mL    Total NET: 1575 mL      02 Nov 2018 07:01  -  03 Nov 2018 03:41  --------------------------------------------------------  IN:    dextrose 5% + sodium chloride 0.45% with potassium chloride 20 mEq/L: 450 mL    IV PiggyBack: 300 mL    sodium chloride 0.9% with potassium chloride 20 mEq/L: 1250 mL  Total IN: 2000 mL    OUT:    Voided: 1600 mL  Total OUT: 1600 mL    Total NET: 400 mL          MEDICATIONS  (STANDING):  dextrose 5% + sodium chloride 0.45% with potassium chloride 20 mEq/L 1000 milliLiter(s) (75 mL/Hr) IV Continuous <Continuous>  enoxaparin Injectable 40 milliGRAM(s) SubCutaneous daily  influenza   Vaccine 0.5 milliLiter(s) IntraMuscular once  metoprolol tartrate Injectable 5 milliGRAM(s) IV Push every 6 hours  ondansetron Injectable 4 milliGRAM(s) IV Push once  piperacillin/tazobactam IVPB. 3.375 Gram(s) IV Intermittent every 8 hours    MEDICATIONS  (PRN):      LABS:                        8.5    8.74  )-----------( 308      ( 01 Nov 2018 05:30 )             28.0     11-01    137  |  98  |  9   ----------------------------<  102<H>  3.6   |  24  |  0.98    Ca    9.0      01 Nov 2018 05:30                      Physical exam     General: no acute distress  Resp: non labored breathing   Abd. soft lower abd tenderness   Neuro: alert and oriented x 3 Progress note surgery A team     Pt was seen and examined this morning. +Lower abdominal pain, controlled with meds. no N/V. +flatus/BM.         Vital Signs Last 24 Hrs  T(C): 37 (02 Nov 2018 23:55), Max: 37.2 (02 Nov 2018 05:13)  T(F): 98.6 (02 Nov 2018 23:55), Max: 98.9 (02 Nov 2018 05:13)  HR: 77 (02 Nov 2018 23:55) (74 - 85)  BP: 125/64 (02 Nov 2018 23:55) (121/63 - 136/60)  BP(mean): --  RR: 17 (02 Nov 2018 23:55) (16 - 19)  SpO2: 96% (02 Nov 2018 23:55) (96% - 100%)    I&O's Detail    01 Nov 2018 07:01  -  02 Nov 2018 07:00  --------------------------------------------------------  IN:    IV PiggyBack: 600 mL    sodium chloride 0.9% with potassium chloride 20 mEq/L: 3125 mL  Total IN: 3725 mL    OUT:    Voided: 2150 mL  Total OUT: 2150 mL    Total NET: 1575 mL      02 Nov 2018 07:01  -  03 Nov 2018 03:41  --------------------------------------------------------  IN:    dextrose 5% + sodium chloride 0.45% with potassium chloride 20 mEq/L: 450 mL    IV PiggyBack: 300 mL    sodium chloride 0.9% with potassium chloride 20 mEq/L: 1250 mL  Total IN: 2000 mL    OUT:    Voided: 1600 mL  Total OUT: 1600 mL    Total NET: 400 mL          MEDICATIONS  (STANDING):  dextrose 5% + sodium chloride 0.45% with potassium chloride 20 mEq/L 1000 milliLiter(s) (75 mL/Hr) IV Continuous <Continuous>  enoxaparin Injectable 40 milliGRAM(s) SubCutaneous daily  influenza   Vaccine 0.5 milliLiter(s) IntraMuscular once  metoprolol tartrate Injectable 5 milliGRAM(s) IV Push every 6 hours  ondansetron Injectable 4 milliGRAM(s) IV Push once  piperacillin/tazobactam IVPB. 3.375 Gram(s) IV Intermittent every 8 hours    MEDICATIONS  (PRN):      LABS:                        8.5    8.74  )-----------( 308      ( 01 Nov 2018 05:30 )             28.0     11-01    137  |  98  |  9   ----------------------------<  102<H>  3.6   |  24  |  0.98    Ca    9.0      01 Nov 2018 05:30                      Physical exam     General: no acute distress  Resp: non labored breathing   Abd. soft lower abd tenderness   Neuro: alert and oriented x 3

## 2018-11-04 ENCOUNTER — TRANSCRIPTION ENCOUNTER (OUTPATIENT)
Age: 63
End: 2018-11-04

## 2018-11-04 VITALS
HEART RATE: 72 BPM | OXYGEN SATURATION: 100 % | TEMPERATURE: 98 F | RESPIRATION RATE: 18 BRPM | DIASTOLIC BLOOD PRESSURE: 64 MMHG | SYSTOLIC BLOOD PRESSURE: 111 MMHG

## 2018-11-04 LAB
BUN SERPL-MCNC: 4 MG/DL — LOW (ref 7–23)
CALCIUM SERPL-MCNC: 9.2 MG/DL — SIGNIFICANT CHANGE UP (ref 8.4–10.5)
CHLORIDE SERPL-SCNC: 99 MMOL/L — SIGNIFICANT CHANGE UP (ref 98–107)
CO2 SERPL-SCNC: 25 MMOL/L — SIGNIFICANT CHANGE UP (ref 22–31)
CREAT SERPL-MCNC: 0.9 MG/DL — SIGNIFICANT CHANGE UP (ref 0.5–1.3)
GLUCOSE SERPL-MCNC: 105 MG/DL — HIGH (ref 70–99)
HCT VFR BLD CALC: 30.3 % — LOW (ref 34.5–45)
HGB BLD-MCNC: 9.3 G/DL — LOW (ref 11.5–15.5)
MAGNESIUM SERPL-MCNC: 2.2 MG/DL — SIGNIFICANT CHANGE UP (ref 1.6–2.6)
MCHC RBC-ENTMCNC: 25.7 PG — LOW (ref 27–34)
MCHC RBC-ENTMCNC: 30.7 % — LOW (ref 32–36)
MCV RBC AUTO: 83.7 FL — SIGNIFICANT CHANGE UP (ref 80–100)
NRBC # FLD: 0 — SIGNIFICANT CHANGE UP
PHOSPHATE SERPL-MCNC: 4.6 MG/DL — HIGH (ref 2.5–4.5)
PLATELET # BLD AUTO: 410 K/UL — HIGH (ref 150–400)
PMV BLD: 9.1 FL — SIGNIFICANT CHANGE UP (ref 7–13)
POTASSIUM SERPL-MCNC: 4.2 MMOL/L — SIGNIFICANT CHANGE UP (ref 3.5–5.3)
POTASSIUM SERPL-SCNC: 4.2 MMOL/L — SIGNIFICANT CHANGE UP (ref 3.5–5.3)
RBC # BLD: 3.62 M/UL — LOW (ref 3.8–5.2)
RBC # FLD: 14.7 % — HIGH (ref 10.3–14.5)
SODIUM SERPL-SCNC: 137 MMOL/L — SIGNIFICANT CHANGE UP (ref 135–145)
WBC # BLD: 6.55 K/UL — SIGNIFICANT CHANGE UP (ref 3.8–10.5)
WBC # FLD AUTO: 6.55 K/UL — SIGNIFICANT CHANGE UP (ref 3.8–10.5)

## 2018-11-04 PROCEDURE — 99233 SBSQ HOSP IP/OBS HIGH 50: CPT

## 2018-11-04 RX ORDER — LABETALOL HCL 100 MG
1 TABLET ORAL
Qty: 0 | Refills: 0 | COMMUNITY

## 2018-11-04 RX ORDER — POTASSIUM CHLORIDE 20 MEQ
1 PACKET (EA) ORAL
Qty: 0 | Refills: 0 | COMMUNITY
Start: 2018-11-04

## 2018-11-04 RX ORDER — POTASSIUM CHLORIDE 20 MEQ
1 PACKET (EA) ORAL
Qty: 0 | Refills: 0 | COMMUNITY

## 2018-11-04 RX ORDER — LABETALOL HCL 100 MG
1 TABLET ORAL
Qty: 0 | Refills: 0 | COMMUNITY
Start: 2018-11-04

## 2018-11-04 RX ADMIN — ENOXAPARIN SODIUM 40 MILLIGRAM(S): 100 INJECTION SUBCUTANEOUS at 12:01

## 2018-11-04 RX ADMIN — INFLUENZA VIRUS VACCINE 0.5 MILLILITER(S): 15; 15; 15; 15 SUSPENSION INTRAMUSCULAR at 15:55

## 2018-11-04 RX ADMIN — Medication 10 MILLIEQUIVALENT(S): at 12:01

## 2018-11-04 RX ADMIN — Medication 100 MILLIGRAM(S): at 05:29

## 2018-11-04 RX ADMIN — PIPERACILLIN AND TAZOBACTAM 25 GRAM(S): 4; .5 INJECTION, POWDER, LYOPHILIZED, FOR SOLUTION INTRAVENOUS at 05:29

## 2018-11-04 NOTE — DISCHARGE NOTE ADULT - HOSPITAL COURSE
63F was admitted to [facility] on 10/31/18. The patient had acute diverticulitis and required treatment with antibiotics. In the ED, WBC was 13. On exam, pt was tender in the LLQ. Outpatient CT showed a perforated proximal to mid sigmoid diverticulitis w extensive phlegmonous change surrounding the proximal sigmoid colon extending into the pelvis. No drainable abscess. the patient was hemodynamically stable and sent to a surgical floor. The patient was pain was controlled by IV pain medications transitioned to po narcotics. The patient was treated with zosyn and her pain improved. The patient was advanced to regular diet and tolerated it well. The patient was hemodynamically stable and placed on home medications. The patient was told to follow up with Dr. Puckett in 2 weeks and had no other issues.

## 2018-11-04 NOTE — DISCHARGE NOTE ADULT - PATIENT PORTAL LINK FT
You can access the [facility] Patient Portal, offered by [facility], by registering with the following website: [email or web address]

## 2018-11-04 NOTE — DISCHARGE NOTE ADULT - CARE PROVIDER_API CALL
Ben Puckett (MD), ColonRectal Surgery; Surgery  [facility]  [address] [place] [id number]  Phone: [phone]  Fax: [phone]

## 2018-11-04 NOTE — PROGRESS NOTE ADULT - ASSESSMENT
63F presenting w/ 1wk abd pain + fever, w/ CT confirmed diverticulitis Hinchey 2    CT obtained from [facility] (in chart)   CLD  IV Abx  Pain control   DVT ppx 63F presenting w/ 1wk abd pain + fever, w/ CT confirmed diverticulitis Hinchey 2    LRD  IV Abx  Pain control   DVT ppx   DC home with 2 weeks of augmentin

## 2018-11-04 NOTE — DISCHARGE NOTE ADULT - ADDITIONAL INSTRUCTIONS
Please follow up with Dr. Puckett in the office in 2 weeks. You may call [phone] to make an appointment

## 2018-11-04 NOTE — DISCHARGE NOTE ADULT - PLAN OF CARE
Tolerating a regular diet, pain controlled IF you develop worsening abdominal pain, fever/chills, nausea/vomiting, please return to the ER for further evaluation

## 2018-11-04 NOTE — DISCHARGE NOTE ADULT - CARE PLAN
Principal Discharge DX:	Diverticulitis  Goal:	Tolerating a regular diet, pain controlled  Assessment and plan of treatment:	IF you develop worsening abdominal pain, fever/chills, nausea/vomiting, please return to the ER for further evaluation

## 2018-11-04 NOTE — DISCHARGE NOTE ADULT - MEDICATION SUMMARY - MEDICATIONS TO TAKE
I will START or STAY ON the medications listed below when I get home from the hospital:    losartan 25 mg oral tablet  -- 1 tab(s) by mouth once a day  -- Indication: For Hypertension    fluvastatin 80 mg oral tablet, extended release  -- 1 tab(s) by mouth once a day  -- Indication: For Hypertension    labetalol 100 mg oral tablet  -- 1 tab(s) by mouth 2 times a day  -- Indication: For Hypertension    indapamide 2.5 mg oral tablet  -- 1 tab(s) by mouth once a day (in the morning)  -- Indication: For Diverticulitis of large intestine with perforation and abscess without bleeding    potassium chloride 10 mEq oral tablet, extended release  -- 1 tab(s) by mouth once a day  -- Indication: For Fibroid uterus    Augmentin 875 mg-125 mg oral tablet  -- 1 tab(s) by mouth 2 times a day MDD:2  -- Finish all this medication unless otherwise directed by prescriber.  Take with food or milk.    -- Indication: For Diverticulitis

## 2018-11-04 NOTE — PROGRESS NOTE ADULT - SUBJECTIVE AND OBJECTIVE BOX
GENERAL SURGERY DAILY PROGRESS NOTE:       Subjective:  Pt seen and examined at bedside. No acute events overnight. Pain controlled. Tolerating CL diet. (-)N/V, (+) flatus/BM.       Objective:    PE:  Gen: resting comfortably in bed, NAD  Resp: breathing comfortably  Abd: soft, NT, ND. No rebound/guarding.     Vital Signs Last 24 Hrs  T(C): 36.9 (03 Nov 2018 20:52), Max: 37.1 (03 Nov 2018 14:00)  T(F): 98.4 (03 Nov 2018 20:52), Max: 98.7 (03 Nov 2018 14:00)  HR: 78 (03 Nov 2018 20:52) (67 - 78)  BP: 121/62 (03 Nov 2018 20:52) (115/55 - 133/67)  BP(mean): --  RR: 18 (03 Nov 2018 20:52) (18 - 18)  SpO2: 99% (03 Nov 2018 20:52) (96% - 100%)    I&O's Detail    02 Nov 2018 07:01  -  03 Nov 2018 07:00  --------------------------------------------------------  IN:    dextrose 5% + sodium chloride 0.45% with potassium chloride 20 mEq/L: 975 mL    IV PiggyBack: 300 mL    sodium chloride 0.9% with potassium chloride 20 mEq/L: 1250 mL  Total IN: 2525 mL    OUT:    Voided: 2400 mL  Total OUT: 2400 mL    Total NET: 125 mL      03 Nov 2018 08:01  -  04 Nov 2018 00:32  --------------------------------------------------------  IN:    sodium chloride 0.9% with potassium chloride 20 mEq/L: 250 mL  Total IN: 250 mL    OUT:    Voided: 2800 mL  Total OUT: 2800 mL    Total NET: -2550 mL          Daily     Daily     MEDICATIONS  (STANDING):  atorvastatin 10 milliGRAM(s) Oral at bedtime  enoxaparin Injectable 40 milliGRAM(s) SubCutaneous daily  influenza   Vaccine 0.5 milliLiter(s) IntraMuscular once  labetalol 100 milliGRAM(s) Oral two times a day  ondansetron Injectable 4 milliGRAM(s) IV Push once  piperacillin/tazobactam IVPB. 3.375 Gram(s) IV Intermittent every 8 hours  potassium chloride    Tablet ER 10 milliEquivalent(s) Oral daily  sodium chloride 0.9% with potassium chloride 20 mEq/L 1000 milliLiter(s) (50 mL/Hr) IV Continuous <Continuous>    MEDICATIONS  (PRN):      LABS:                        9.0    6.40  )-----------( 364      ( 03 Nov 2018 06:03 )             29.4     11-03    139  |  100  |  6<L>  ----------------------------<  100<H>  4.2   |  24  |  0.86    Ca    9.1      03 Nov 2018 06:03  Phos  3.2     11-03  Mg     1.7     11-03    TPro  7.4  /  Alb  3.3  /  TBili  0.4  /  DBili  x   /  AST  10  /  ALT  9   /  AlkPhos  80  11-03          RADIOLOGY & ADDITIONAL STUDIES:

## 2018-11-05 LAB
BACTERIA BLD CULT: SIGNIFICANT CHANGE UP
BACTERIA BLD CULT: SIGNIFICANT CHANGE UP

## 2018-11-26 PROBLEM — I10 ESSENTIAL (PRIMARY) HYPERTENSION: Chronic | Status: ACTIVE | Noted: 2018-10-31

## 2018-12-04 ENCOUNTER — APPOINTMENT (OUTPATIENT)
Dept: SURGICAL ONCOLOGY | Facility: CLINIC | Age: 63
End: 2018-12-04
Payer: COMMERCIAL

## 2018-12-04 VITALS
OXYGEN SATURATION: 96 % | SYSTOLIC BLOOD PRESSURE: 132 MMHG | RESPIRATION RATE: 15 BRPM | HEART RATE: 92 BPM | DIASTOLIC BLOOD PRESSURE: 81 MMHG | HEIGHT: 60 IN | WEIGHT: 198 LBS | BODY MASS INDEX: 38.87 KG/M2

## 2018-12-04 DIAGNOSIS — Z86.79 PERSONAL HISTORY OF OTHER DISEASES OF THE CIRCULATORY SYSTEM: ICD-10-CM

## 2018-12-04 DIAGNOSIS — Z83.71 FAMILY HISTORY OF COLONIC POLYPS: ICD-10-CM

## 2018-12-04 DIAGNOSIS — Z87.19 PERSONAL HISTORY OF OTHER DISEASES OF THE DIGESTIVE SYSTEM: ICD-10-CM

## 2018-12-04 PROCEDURE — 99244 OFF/OP CNSLTJ NEW/EST MOD 40: CPT

## 2018-12-04 RX ORDER — INDAPAMIDE 1.25 MG/1
TABLET, FILM COATED ORAL
Refills: 0 | Status: ACTIVE | COMMUNITY

## 2018-12-04 RX ORDER — LOSARTAN POTASSIUM 100 MG/1
TABLET, FILM COATED ORAL
Refills: 0 | Status: ACTIVE | COMMUNITY

## 2018-12-04 RX ORDER — LABETALOL HYDROCHLORIDE 300 MG/1
TABLET, FILM COATED ORAL
Refills: 0 | Status: ACTIVE | COMMUNITY

## 2018-12-11 ENCOUNTER — APPOINTMENT (OUTPATIENT)
Dept: COLORECTAL SURGERY | Facility: CLINIC | Age: 63
End: 2018-12-11
Payer: COMMERCIAL

## 2018-12-11 VITALS
OXYGEN SATURATION: 96 % | WEIGHT: 195 LBS | HEIGHT: 60 IN | RESPIRATION RATE: 14 BRPM | DIASTOLIC BLOOD PRESSURE: 84 MMHG | SYSTOLIC BLOOD PRESSURE: 137 MMHG | BODY MASS INDEX: 38.28 KG/M2 | HEART RATE: 88 BPM | TEMPERATURE: 98 F

## 2018-12-11 DIAGNOSIS — Z80.1 FAMILY HISTORY OF MALIGNANT NEOPLASM OF TRACHEA, BRONCHUS AND LUNG: ICD-10-CM

## 2018-12-11 DIAGNOSIS — Z80.0 FAMILY HISTORY OF MALIGNANT NEOPLASM OF DIGESTIVE ORGANS: ICD-10-CM

## 2018-12-11 PROCEDURE — 99213 OFFICE O/P EST LOW 20 MIN: CPT

## 2018-12-11 RX ORDER — ASPIRIN 81 MG
81 TABLET,CHEWABLE ORAL
Refills: 0 | Status: ACTIVE | COMMUNITY

## 2018-12-11 RX ORDER — FLUVASTATIN 40 MG/1
CAPSULE ORAL
Refills: 0 | Status: ACTIVE | COMMUNITY

## 2018-12-13 ENCOUNTER — INPATIENT (INPATIENT)
Facility: HOSPITAL | Age: 63
LOS: 3 days | Discharge: HOME | End: 2018-12-17
Attending: STUDENT IN AN ORGANIZED HEALTH CARE EDUCATION/TRAINING PROGRAM | Admitting: STUDENT IN AN ORGANIZED HEALTH CARE EDUCATION/TRAINING PROGRAM
Payer: COMMERCIAL

## 2018-12-13 VITALS
TEMPERATURE: 99 F | DIASTOLIC BLOOD PRESSURE: 66 MMHG | OXYGEN SATURATION: 98 % | RESPIRATION RATE: 16 BRPM | SYSTOLIC BLOOD PRESSURE: 140 MMHG | HEART RATE: 82 BPM

## 2018-12-13 DIAGNOSIS — D25.9 LEIOMYOMA OF UTERUS, UNSPECIFIED: Chronic | ICD-10-CM

## 2018-12-13 DIAGNOSIS — K57.92 DIVERTICULITIS OF INTESTINE, PART UNSPECIFIED, WITHOUT PERFORATION OR ABSCESS WITHOUT BLEEDING: ICD-10-CM

## 2018-12-13 LAB
ALBUMIN SERPL ELPH-MCNC: 3.8 G/DL — SIGNIFICANT CHANGE UP (ref 3.3–5)
ALP SERPL-CCNC: 79 U/L — SIGNIFICANT CHANGE UP (ref 40–120)
ALT FLD-CCNC: 14 U/L — SIGNIFICANT CHANGE UP (ref 4–33)
AST SERPL-CCNC: 17 U/L — SIGNIFICANT CHANGE UP (ref 4–32)
BASE EXCESS BLDV CALC-SCNC: 5.8 MMOL/L — SIGNIFICANT CHANGE UP
BASOPHILS # BLD AUTO: 0.08 K/UL — SIGNIFICANT CHANGE UP (ref 0–0.2)
BASOPHILS NFR BLD AUTO: 0.9 % — SIGNIFICANT CHANGE UP (ref 0–2)
BILIRUB SERPL-MCNC: 0.2 MG/DL — SIGNIFICANT CHANGE UP (ref 0.2–1.2)
BLOOD GAS VENOUS - CREATININE: 0.63 MG/DL — SIGNIFICANT CHANGE UP (ref 0.5–1.3)
BUN SERPL-MCNC: 20 MG/DL — SIGNIFICANT CHANGE UP (ref 7–23)
CALCIUM SERPL-MCNC: 9.7 MG/DL — SIGNIFICANT CHANGE UP (ref 8.4–10.5)
CHLORIDE BLDV-SCNC: 101 MMOL/L — SIGNIFICANT CHANGE UP (ref 96–108)
CHLORIDE SERPL-SCNC: 97 MMOL/L — LOW (ref 98–107)
CO2 SERPL-SCNC: 29 MMOL/L — SIGNIFICANT CHANGE UP (ref 22–31)
CREAT SERPL-MCNC: 0.88 MG/DL — SIGNIFICANT CHANGE UP (ref 0.5–1.3)
EOSINOPHIL # BLD AUTO: 0.37 K/UL — SIGNIFICANT CHANGE UP (ref 0–0.5)
EOSINOPHIL NFR BLD AUTO: 4.2 % — SIGNIFICANT CHANGE UP (ref 0–6)
GAS PNL BLDV: 138 MMOL/L — SIGNIFICANT CHANGE UP (ref 136–146)
GLUCOSE BLDV-MCNC: 102 — HIGH (ref 70–99)
GLUCOSE SERPL-MCNC: 100 MG/DL — HIGH (ref 70–99)
HCO3 BLDV-SCNC: 28 MMOL/L — HIGH (ref 20–27)
HCT VFR BLD CALC: 33.2 % — LOW (ref 34.5–45)
HCT VFR BLDV CALC: 32.1 % — LOW (ref 34.5–45)
HGB BLD-MCNC: 9.8 G/DL — LOW (ref 11.5–15.5)
HGB BLDV-MCNC: 10.4 G/DL — LOW (ref 11.5–15.5)
IMM GRANULOCYTES # BLD AUTO: 0.05 # — SIGNIFICANT CHANGE UP
IMM GRANULOCYTES NFR BLD AUTO: 0.6 % — SIGNIFICANT CHANGE UP (ref 0–1.5)
LACTATE BLDV-MCNC: 2.8 MMOL/L — HIGH (ref 0.5–2)
LYMPHOCYTES # BLD AUTO: 2.33 K/UL — SIGNIFICANT CHANGE UP (ref 1–3.3)
LYMPHOCYTES # BLD AUTO: 26.2 % — SIGNIFICANT CHANGE UP (ref 13–44)
MCHC RBC-ENTMCNC: 25.3 PG — LOW (ref 27–34)
MCHC RBC-ENTMCNC: 29.5 % — LOW (ref 32–36)
MCV RBC AUTO: 85.8 FL — SIGNIFICANT CHANGE UP (ref 80–100)
MONOCYTES # BLD AUTO: 0.82 K/UL — SIGNIFICANT CHANGE UP (ref 0–0.9)
MONOCYTES NFR BLD AUTO: 9.2 % — SIGNIFICANT CHANGE UP (ref 2–14)
NEUTROPHILS # BLD AUTO: 5.24 K/UL — SIGNIFICANT CHANGE UP (ref 1.8–7.4)
NEUTROPHILS NFR BLD AUTO: 58.9 % — SIGNIFICANT CHANGE UP (ref 43–77)
NRBC # FLD: 0 — SIGNIFICANT CHANGE UP
PCO2 BLDV: 60 MMHG — HIGH (ref 41–51)
PH BLDV: 7.34 PH — SIGNIFICANT CHANGE UP (ref 7.32–7.43)
PLATELET # BLD AUTO: 515 K/UL — HIGH (ref 150–400)
PMV BLD: 9.4 FL — SIGNIFICANT CHANGE UP (ref 7–13)
PO2 BLDV: 33 MMHG — LOW (ref 35–40)
POTASSIUM BLDV-SCNC: 3.6 MMOL/L — SIGNIFICANT CHANGE UP (ref 3.4–4.5)
POTASSIUM SERPL-MCNC: 3.7 MMOL/L — SIGNIFICANT CHANGE UP (ref 3.5–5.3)
POTASSIUM SERPL-SCNC: 3.7 MMOL/L — SIGNIFICANT CHANGE UP (ref 3.5–5.3)
PROT SERPL-MCNC: 8.5 G/DL — HIGH (ref 6–8.3)
RBC # BLD: 3.87 M/UL — SIGNIFICANT CHANGE UP (ref 3.8–5.2)
RBC # FLD: 16.7 % — HIGH (ref 10.3–14.5)
SAO2 % BLDV: 50.8 % — LOW (ref 60–85)
SODIUM SERPL-SCNC: 139 MMOL/L — SIGNIFICANT CHANGE UP (ref 135–145)
WBC # BLD: 8.89 K/UL — SIGNIFICANT CHANGE UP (ref 3.8–10.5)
WBC # FLD AUTO: 8.89 K/UL — SIGNIFICANT CHANGE UP (ref 3.8–10.5)

## 2018-12-13 RX ORDER — LOSARTAN POTASSIUM 100 MG/1
25 TABLET, FILM COATED ORAL DAILY
Qty: 0 | Refills: 0 | Status: DISCONTINUED | OUTPATIENT
Start: 2018-12-13 | End: 2018-12-17

## 2018-12-13 RX ORDER — SODIUM CHLORIDE 9 MG/ML
1000 INJECTION, SOLUTION INTRAVENOUS
Qty: 0 | Refills: 0 | Status: DISCONTINUED | OUTPATIENT
Start: 2018-12-13 | End: 2018-12-17

## 2018-12-13 RX ORDER — ATORVASTATIN CALCIUM 80 MG/1
20 TABLET, FILM COATED ORAL AT BEDTIME
Qty: 0 | Refills: 0 | Status: DISCONTINUED | OUTPATIENT
Start: 2018-12-13 | End: 2018-12-17

## 2018-12-13 RX ORDER — SODIUM CHLORIDE 9 MG/ML
1000 INJECTION INTRAMUSCULAR; INTRAVENOUS; SUBCUTANEOUS ONCE
Qty: 0 | Refills: 0 | Status: COMPLETED | OUTPATIENT
Start: 2018-12-13 | End: 2018-12-13

## 2018-12-13 RX ORDER — ENOXAPARIN SODIUM 100 MG/ML
40 INJECTION SUBCUTANEOUS DAILY
Qty: 0 | Refills: 0 | Status: DISCONTINUED | OUTPATIENT
Start: 2018-12-13 | End: 2018-12-17

## 2018-12-13 RX ORDER — PIPERACILLIN AND TAZOBACTAM 4; .5 G/20ML; G/20ML
3.38 INJECTION, POWDER, LYOPHILIZED, FOR SOLUTION INTRAVENOUS EVERY 8 HOURS
Qty: 0 | Refills: 0 | Status: DISCONTINUED | OUTPATIENT
Start: 2018-12-13 | End: 2018-12-17

## 2018-12-13 RX ADMIN — SODIUM CHLORIDE 1000 MILLILITER(S): 9 INJECTION INTRAMUSCULAR; INTRAVENOUS; SUBCUTANEOUS at 20:19

## 2018-12-13 RX ADMIN — PIPERACILLIN AND TAZOBACTAM 25 GRAM(S): 4; .5 INJECTION, POWDER, LYOPHILIZED, FOR SOLUTION INTRAVENOUS at 22:27

## 2018-12-13 NOTE — ED ADULT NURSE NOTE - NSIMPLEMENTINTERV_GEN_ALL_ED
Implemented All Universal Safety Interventions:  [place] to call system. Call bell, personal items and telephone within reach. Instruct patient to call for assistance. Room bathroom lighting operational. Non-slip footwear when patient is off stretcher. Physically safe environment: no spills, clutter or unnecessary equipment. Stretcher in lowest position, wheels locked, appropriate side rails in place.

## 2018-12-13 NOTE — ED PROVIDER NOTE - NS ED ROS FT
CONSTITUTIONAL: No fevers, no chills  Cardiovascular: No Chest pain  Respiratory: No SOB  Gastrointestinal: refer to HPI  Genitourinary: no dysuria, no hematuria  SKIN: no rashes.  NEURO: no headache  PSYCHIATRIC: no known mental health issues.

## 2018-12-13 NOTE — ED PROVIDER NOTE - PROGRESS NOTE DETAILS
Dr. Pollock spoke to radiology resident and there is a 5cm x 3 cm collection between uterus and bowel which will need to be drained (slide ). Surgery made aware. Kiersten NICKERSON: Dr. Pollock spoke to radiology resident and there is a 5cm x 3 cm collection between uterus and bowel which will need to be drained (slide ). Surgery made aware.

## 2018-12-13 NOTE — ED ADULT NURSE REASSESSMENT NOTE - NS ED NURSE REASSESS COMMENT FT1
report received from CAROLINA Grady, pt A&Ox3, ambulatory, c/o abdominal pain. denies chest pain, SOB, chills, nausea, vomiting. awaiting consult. pt t expresses hunger, PO status dependent on consult. comfort measures provided. will continue to monitor.

## 2018-12-13 NOTE — ED ADULT NURSE REASSESSMENT NOTE - NS ED NURSE REASSESS COMMENT FT1
pt sitting upright on stretcher, eating food. appears comfortable, respirations equal and non labored. NPO after midnight, will continue to monitor.

## 2018-12-13 NOTE — H&P ADULT - NSHPPHYSICALEXAM_GEN_ALL_CORE
GEN: NAD, resting quietly  PULM: symmetric chest rise bilaterally, no increased WOB  CV: regular rate, peripheral pulses intact  ABD: soft, NTND  EXTR: no cyanosis or edema, moving all extremities

## 2018-12-13 NOTE — ED ADULT NURSE REASSESSMENT NOTE - NS ED NURSE REASSESS COMMENT FT1
report given to [facility] 2 RN Cher. pt A&Ox3, ambulatory. IV antibiotics infusing well. vitals as noted.

## 2018-12-13 NOTE — ED PROVIDER NOTE - PHYSICAL EXAMINATION
PHYSICAL EXAM:  GENERAL: NAD, speaks in full sentences, no signs of respiratory distress  CHEST/LUNG: Clear to auscultation bilaterally; No wheeze; No crackles; No accessory muscles used  HEART: Regular rate and rhythm; No murmurs;   ABDOMEN: Soft, slightly tender in LUQ, Nondistended; Bowel sounds present; No guarding  EXTREMITIES:  2+ Peripheral Pulses, No cyanosis or edema  PSYCH: AAOx3  NEUROLOGY: moving all extremities   SKIN: No rashes or lesions

## 2018-12-13 NOTE — H&P ADULT - NSHPLABSRESULTS_GEN_ALL_CORE
CBC (12-13 @ 18:27)                              9.8<L>                         8.89    )----------------(  515<H>     58.9  % Neutrophils, 26.2  % Lymphocytes, ANC: 5.24                                33.2<L>                BMP (12-13 @ 18:27)             139     |  97<L>   |  20    		Ca++ --      Ca 9.7                ---------------------------------( 100<H>		Mg --                 3.7     |  29      |  0.88  			Ph --        LFTs (12-13 @ 18:27)      TPro 8.5<H> / Alb 3.8 / TBili 0.2 / DBili -- / AST 17 / ALT 14 / AlkPhos 79      ABG (12-13 @ 18:43)      /  /  /  /  / %     Lactate:   2.8<H>    VBG (12-13 @ 18:43)     7.34 / 60<H> / 33<L> / 28<H> / 5.8 / 50.8<L>%    IMAGING:  CT A/P performed outpatient. Per radiology "wet read": LLQ/pelvic fluid collection adjacent to the uterus, approx. 5x3 cm

## 2018-12-13 NOTE — H&P ADULT - ASSESSMENT
64 yo woman with a hx of HTN, and uterine fibroids s/p remote fibroidectomy, and diverticulitis with recent admission (10/31-11/04) for perforated diverticulitis, persistent fevers as an outpatient, found to have diverticular abscess in the region of the uterus.    PLAN:  -admit to A team, Dr. Puckett  -diet for now, NPO at midnight  -IVF  -IV abx: zosyn  -consult IR for possible drainage of pelvic abscess  DVT PPx: lovenox    Patient discussed with Dr. Puckett

## 2018-12-13 NOTE — ED PROVIDER NOTE - OBJECTIVE STATEMENT
63F h/o HTN, diverticulitis hinchey 2 s/p abx p/w cc of fevers    Pt had abx for tx of diverticulitis, improved after d/c however continued to have fevers and LLQ abdominal pain, Surgeon Dr Ben Puckett [phone])had pt do CT abdomen w/ contrast today which found evidence of abdominal abscess and pt was sent to ER.

## 2018-12-13 NOTE — ED PROVIDER NOTE - ATTENDING CONTRIBUTION TO CARE
Patient is a 64 yo F with history of diverticulitis, admitted 10/21/18 to 11/4/18 for perforated proximal to mid sigmoid diverticulitis with phlegmonous changes. Patient arrives today after outpatient CT Scan stating that an abscess was found and she was sent in for admission. Patient is a 64 yo F with history of diverticulitis, admitted 10/21/18 to 11/4/18 for perforated proximal to mid sigmoid diverticulitis with phlegmonous changes. Patient arrives today after outpatient CT Scan stating that an abscess was found and she was sent in for admission. Patient has been having consistent fevers x 1 month. They never went away. She is describing pain as soreness. Patient is a 64 yo F with history of diverticulitis, admitted 10/21/18 to 11/4/18 for perforated proximal to mid sigmoid diverticulitis with phlegmonous changes. Patient arrives today after outpatient CT Scan stating that an abscess was found and she was sent in for admission. Patient has been having consistent fevers x 1 month. They never went away. She is describing pain as soreness. No nausea, vomiting.    VS noted  Gen. no acute distress, Non toxic   HEENT: EOMI, mmm,   Lungs: CTAB/L no C/ W /R   CVS: RRR   Abd; Soft non tender, non distended   Ext: no edema  Skin: no rash  Neuro AAOx3 non focal clear speech  a/p: s/p perforated diverticulitis now with abscess - appears clinically well however has been having persistent fevers. Patient to be admitted. Will check labs, discuss with surgery.   - Kiersten NICKERSON

## 2018-12-13 NOTE — ED ADULT NURSE REASSESSMENT NOTE - NS ED NURSE REASSESS COMMENT FT1
pt ambulated to restroom independently, gait steady no distress noted respirations equal and non labored.

## 2018-12-13 NOTE — H&P ADULT - HISTORY OF PRESENT ILLNESS
62 yo woman with a hx of HTN, and uterine fibroids s/p remote fibroidectomy, and diverticulitis with recent admission (10/31-11/04) for perforated diverticulitis, being treated with oral antibiotics and low fiber diet as an outpatient but noticed persistent fevers prompting a CT scan yesterday which revealed a diverticular abscess in the region of the uterus.    Patient denies any pain. She has lost weight over the past few months which she attributes to dietary changes. She reports regular, formed bowel movements. She denies chills, N/V.

## 2018-12-13 NOTE — ED PROVIDER NOTE - MEDICAL DECISION MAKING DETAILS
63F h/o HTN, diverticulitis hinchey 2 s/p abx p/w cc of fevers. Will get bcx/labs/attempt to reach surgeon Dr Puckett for abscess and upload CT to system, will monitor

## 2018-12-14 LAB
GRAM STN WND: SIGNIFICANT CHANGE UP
HCT VFR BLD CALC: 31.1 % — LOW (ref 34.5–45)
HGB BLD-MCNC: 9.1 G/DL — LOW (ref 11.5–15.5)
MCHC RBC-ENTMCNC: 25.3 PG — LOW (ref 27–34)
MCHC RBC-ENTMCNC: 29.3 % — LOW (ref 32–36)
MCV RBC AUTO: 86.4 FL — SIGNIFICANT CHANGE UP (ref 80–100)
NRBC # FLD: 0 — SIGNIFICANT CHANGE UP
PLATELET # BLD AUTO: 471 K/UL — HIGH (ref 150–400)
PMV BLD: 9.1 FL — SIGNIFICANT CHANGE UP (ref 7–13)
RBC # BLD: 3.6 M/UL — LOW (ref 3.8–5.2)
RBC # FLD: 16.9 % — HIGH (ref 10.3–14.5)
SPECIMEN SOURCE: SIGNIFICANT CHANGE UP
WBC # BLD: 6.64 K/UL — SIGNIFICANT CHANGE UP (ref 3.8–10.5)
WBC # FLD AUTO: 6.64 K/UL — SIGNIFICANT CHANGE UP (ref 3.8–10.5)

## 2018-12-14 PROCEDURE — 99223 1ST HOSP IP/OBS HIGH 75: CPT

## 2018-12-14 PROCEDURE — 49406 IMAGE CATH FLUID PERI/RETRO: CPT

## 2018-12-14 RX ADMIN — ATORVASTATIN CALCIUM 20 MILLIGRAM(S): 80 TABLET, FILM COATED ORAL at 22:44

## 2018-12-14 RX ADMIN — PIPERACILLIN AND TAZOBACTAM 25 GRAM(S): 4; .5 INJECTION, POWDER, LYOPHILIZED, FOR SOLUTION INTRAVENOUS at 22:44

## 2018-12-14 RX ADMIN — LOSARTAN POTASSIUM 25 MILLIGRAM(S): 100 TABLET, FILM COATED ORAL at 05:32

## 2018-12-14 RX ADMIN — SODIUM CHLORIDE 75 MILLILITER(S): 9 INJECTION, SOLUTION INTRAVENOUS at 18:48

## 2018-12-14 RX ADMIN — PIPERACILLIN AND TAZOBACTAM 25 GRAM(S): 4; .5 INJECTION, POWDER, LYOPHILIZED, FOR SOLUTION INTRAVENOUS at 05:32

## 2018-12-14 RX ADMIN — SODIUM CHLORIDE 75 MILLILITER(S): 9 INJECTION, SOLUTION INTRAVENOUS at 00:09

## 2018-12-14 NOTE — CHART NOTE - NSCHARTNOTEFT_GEN_A_CORE
Pre-Interventional Radiology Procedure Note    63y Female    Procedure: Drainage of pelvic abscess    Diagnosis/Indication: Patient is a 63y old  Female who presents with a chief complaint of Diverticular abscess (returned to ER after course of antibiotic treatment for diverticulitis)    Interventional Radiology Attending Physician: Dr. Estes    Ordering Attending Physician: Dr. Puckett    PAST MEDICAL & SURGICAL HISTORY:  Hypertension  Diverticulitis  Fibroid uterus       CBC Full  -  ( 13 Dec 2018 18:27 )  WBC Count : 8.89 K/uL  Hemoglobin : 9.8 g/dL  Hematocrit : 33.2 %  Platelet Count - Automated : 515 K/uL  Mean Cell Volume : 85.8 fL  Mean Cell Hemoglobin : 25.3 pg  Mean Cell Hemoglobin Concentration : 29.5 %  Auto Neutrophil # : 5.24 K/uL  Auto Lymphocyte # : 2.33 K/uL  Auto Monocyte # : 0.82 K/uL  Auto Eosinophil # : 0.37 K/uL  Auto Basophil # : 0.08 K/uL  Auto Neutrophil % : 58.9 %  Auto Lymphocyte % : 26.2 %  Auto Monocyte % : 9.2 %  Auto Eosinophil % : 4.2 %  Auto Basophil % : 0.9 %    12-13    139  |  97<L>  |  20  ----------------------------<  100<H>  3.7   |  29  |  0.88    Ca    9.7      13 Dec 2018 18:27    TPro  8.5<H>  /  Alb  3.8  /  TBili  0.2  /  DBili  x   /  AST  17  /  ALT  14  /  AlkPhos  79  12-13

## 2018-12-14 NOTE — CHART NOTE - NSCHARTNOTEFT_GEN_A_CORE
VASCULAR & INTERVENTIONAL RADIOLOGY    63F w/ diverticular abscess s/p drainage w/ 35 cc of purulent fluid removed. 10 Fr catheter left in place with suction bulb. No immediate complications.    Please contact us if you have any questions/comments/concerns.    Arabella Pimentel MD  PGY-3  Pager #[phone]

## 2018-12-15 LAB
BASOPHILS # BLD AUTO: 0.07 K/UL — SIGNIFICANT CHANGE UP (ref 0–0.2)
BASOPHILS NFR BLD AUTO: 1 % — SIGNIFICANT CHANGE UP (ref 0–2)
BUN SERPL-MCNC: 12 MG/DL — SIGNIFICANT CHANGE UP (ref 7–23)
CALCIUM SERPL-MCNC: 9.2 MG/DL — SIGNIFICANT CHANGE UP (ref 8.4–10.5)
CHLORIDE SERPL-SCNC: 100 MMOL/L — SIGNIFICANT CHANGE UP (ref 98–107)
CO2 SERPL-SCNC: 27 MMOL/L — SIGNIFICANT CHANGE UP (ref 22–31)
CREAT SERPL-MCNC: 0.92 MG/DL — SIGNIFICANT CHANGE UP (ref 0.5–1.3)
EOSINOPHIL # BLD AUTO: 0.49 K/UL — SIGNIFICANT CHANGE UP (ref 0–0.5)
EOSINOPHIL NFR BLD AUTO: 7.2 % — HIGH (ref 0–6)
GLUCOSE SERPL-MCNC: 110 MG/DL — HIGH (ref 70–99)
HCT VFR BLD CALC: 28.5 % — LOW (ref 34.5–45)
HCT VFR BLD CALC: 28.5 % — LOW (ref 34.5–45)
HGB BLD-MCNC: 8.5 G/DL — LOW (ref 11.5–15.5)
HGB BLD-MCNC: 8.5 G/DL — LOW (ref 11.5–15.5)
IMM GRANULOCYTES # BLD AUTO: 0.02 # — SIGNIFICANT CHANGE UP
IMM GRANULOCYTES NFR BLD AUTO: 0.3 % — SIGNIFICANT CHANGE UP (ref 0–1.5)
LYMPHOCYTES # BLD AUTO: 1.63 K/UL — SIGNIFICANT CHANGE UP (ref 1–3.3)
LYMPHOCYTES # BLD AUTO: 23.9 % — SIGNIFICANT CHANGE UP (ref 13–44)
MAGNESIUM SERPL-MCNC: 1.9 MG/DL — SIGNIFICANT CHANGE UP (ref 1.6–2.6)
MCHC RBC-ENTMCNC: 25.5 PG — LOW (ref 27–34)
MCHC RBC-ENTMCNC: 25.5 PG — LOW (ref 27–34)
MCHC RBC-ENTMCNC: 29.8 % — LOW (ref 32–36)
MCHC RBC-ENTMCNC: 29.8 % — LOW (ref 32–36)
MCV RBC AUTO: 85.6 FL — SIGNIFICANT CHANGE UP (ref 80–100)
MCV RBC AUTO: 85.6 FL — SIGNIFICANT CHANGE UP (ref 80–100)
MONOCYTES # BLD AUTO: 0.57 K/UL — SIGNIFICANT CHANGE UP (ref 0–0.9)
MONOCYTES NFR BLD AUTO: 8.4 % — SIGNIFICANT CHANGE UP (ref 2–14)
NEUTROPHILS # BLD AUTO: 4.03 K/UL — SIGNIFICANT CHANGE UP (ref 1.8–7.4)
NEUTROPHILS NFR BLD AUTO: 59.2 % — SIGNIFICANT CHANGE UP (ref 43–77)
NRBC # FLD: 0 — SIGNIFICANT CHANGE UP
NRBC # FLD: 0 — SIGNIFICANT CHANGE UP
PHOSPHATE SERPL-MCNC: 4.5 MG/DL — SIGNIFICANT CHANGE UP (ref 2.5–4.5)
PLATELET # BLD AUTO: 395 K/UL — SIGNIFICANT CHANGE UP (ref 150–400)
PLATELET # BLD AUTO: 395 K/UL — SIGNIFICANT CHANGE UP (ref 150–400)
PMV BLD: 9 FL — SIGNIFICANT CHANGE UP (ref 7–13)
PMV BLD: 9 FL — SIGNIFICANT CHANGE UP (ref 7–13)
POTASSIUM SERPL-MCNC: 3.4 MMOL/L — LOW (ref 3.5–5.3)
POTASSIUM SERPL-SCNC: 3.4 MMOL/L — LOW (ref 3.5–5.3)
RBC # BLD: 3.33 M/UL — LOW (ref 3.8–5.2)
RBC # BLD: 3.33 M/UL — LOW (ref 3.8–5.2)
RBC # FLD: 17 % — HIGH (ref 10.3–14.5)
RBC # FLD: 17 % — HIGH (ref 10.3–14.5)
SODIUM SERPL-SCNC: 142 MMOL/L — SIGNIFICANT CHANGE UP (ref 135–145)
WBC # BLD: 6.81 K/UL — SIGNIFICANT CHANGE UP (ref 3.8–10.5)
WBC # BLD: 6.81 K/UL — SIGNIFICANT CHANGE UP (ref 3.8–10.5)
WBC # FLD AUTO: 6.81 K/UL — SIGNIFICANT CHANGE UP (ref 3.8–10.5)
WBC # FLD AUTO: 6.81 K/UL — SIGNIFICANT CHANGE UP (ref 3.8–10.5)

## 2018-12-15 RX ORDER — POTASSIUM CHLORIDE 20 MEQ
40 PACKET (EA) ORAL EVERY 4 HOURS
Qty: 0 | Refills: 0 | Status: COMPLETED | OUTPATIENT
Start: 2018-12-15 | End: 2018-12-15

## 2018-12-15 RX ADMIN — PIPERACILLIN AND TAZOBACTAM 25 GRAM(S): 4; .5 INJECTION, POWDER, LYOPHILIZED, FOR SOLUTION INTRAVENOUS at 06:19

## 2018-12-15 RX ADMIN — Medication 40 MILLIEQUIVALENT(S): at 09:49

## 2018-12-15 RX ADMIN — Medication 40 MILLIEQUIVALENT(S): at 13:01

## 2018-12-15 RX ADMIN — ATORVASTATIN CALCIUM 20 MILLIGRAM(S): 80 TABLET, FILM COATED ORAL at 21:52

## 2018-12-15 RX ADMIN — LOSARTAN POTASSIUM 25 MILLIGRAM(S): 100 TABLET, FILM COATED ORAL at 06:19

## 2018-12-15 RX ADMIN — PIPERACILLIN AND TAZOBACTAM 25 GRAM(S): 4; .5 INJECTION, POWDER, LYOPHILIZED, FOR SOLUTION INTRAVENOUS at 13:01

## 2018-12-15 RX ADMIN — ENOXAPARIN SODIUM 40 MILLIGRAM(S): 100 INJECTION SUBCUTANEOUS at 11:26

## 2018-12-15 RX ADMIN — SODIUM CHLORIDE 75 MILLILITER(S): 9 INJECTION, SOLUTION INTRAVENOUS at 09:49

## 2018-12-15 RX ADMIN — PIPERACILLIN AND TAZOBACTAM 25 GRAM(S): 4; .5 INJECTION, POWDER, LYOPHILIZED, FOR SOLUTION INTRAVENOUS at 21:52

## 2018-12-15 NOTE — CHART NOTE - NSCHARTNOTEFT_GEN_A_CORE
Post-procedure Check  Patient examined at 8:45 pm on 12/14/18    SUBJECTIVE: No acute events in the immediate post-operative period. Cheerful disposition. States pain in abdomen is 2/10 at rest. Worsens when she coughs or makes sudden movements, otherwise pain has been well controlled. States that she feels much better after having the procedure. Consuming minimal pain medication.    OBJECTIVE:  T(C): 37 (12-14-18 @ 22:31), Max: 37 (12-14-18 @ 22:31)  HR: 85 (12-14-18 @ 22:31) (73 - 85)  BP: 128/67 (12-14-18 @ 22:31) (122/68 - 129/67)  RR: 19 (12-14-18 @ 22:31) (18 - 19)  SpO2: 96% (12-14-18 @ 22:31) (96% - 100%)      12-14-18 @ 07:01  -  12-15-18 @ 03:17  --------------------------------------------------------  IN: 0 mL / OUT: 60 mL / NET: -60 mL        Physical Exam:   General: well developed, well nourished, obese, NAD  Neuro: alert and oriented, no focal deficits, moves all extremities spontaneously  HEENT: NCAT, EOMI, anicteric, mucosa moist  Respiratory: airway patent, respirations unlabored  CVS: regular rate and rhythm  Abdomen: soft, nontender, nondistended, PATO drain placed in lower left abdomen with SS drainage  Extremities: no edema, sensation and movement grossly intact  Skin: warm, dry, appropriate color    ASSESSMENT:   MONA BENITEZ is a 63y Female presenting after IR drainage of a diverticular abscess.     PLAN:  - Pain management  - Continue zosyn  - MIVF  - Follow UOP  - Keep NPO status for now, will advance diet in the morning  - OOB as tolerated.   - IS  - Disposition: pending hospital course

## 2018-12-16 LAB
BUN SERPL-MCNC: 10 MG/DL — SIGNIFICANT CHANGE UP (ref 7–23)
CALCIUM SERPL-MCNC: 9.2 MG/DL — SIGNIFICANT CHANGE UP (ref 8.4–10.5)
CHLORIDE SERPL-SCNC: 102 MMOL/L — SIGNIFICANT CHANGE UP (ref 98–107)
CO2 SERPL-SCNC: 25 MMOL/L — SIGNIFICANT CHANGE UP (ref 22–31)
CREAT SERPL-MCNC: 0.99 MG/DL — SIGNIFICANT CHANGE UP (ref 0.5–1.3)
GLUCOSE SERPL-MCNC: 104 MG/DL — HIGH (ref 70–99)
GRAM STN WND: SIGNIFICANT CHANGE UP
HCT VFR BLD CALC: 29.3 % — LOW (ref 34.5–45)
HGB BLD-MCNC: 8.8 G/DL — LOW (ref 11.5–15.5)
MAGNESIUM SERPL-MCNC: 2 MG/DL — SIGNIFICANT CHANGE UP (ref 1.6–2.6)
MCHC RBC-ENTMCNC: 25.4 PG — LOW (ref 27–34)
MCHC RBC-ENTMCNC: 30 % — LOW (ref 32–36)
MCV RBC AUTO: 84.4 FL — SIGNIFICANT CHANGE UP (ref 80–100)
METHOD TYPE: SIGNIFICANT CHANGE UP
NRBC # FLD: 0 — SIGNIFICANT CHANGE UP
ORGANISM # SPEC MICROSCOPIC CNT: SIGNIFICANT CHANGE UP
PHOSPHATE SERPL-MCNC: 3.6 MG/DL — SIGNIFICANT CHANGE UP (ref 2.5–4.5)
PLATELET # BLD AUTO: 403 K/UL — HIGH (ref 150–400)
PMV BLD: 9.4 FL — SIGNIFICANT CHANGE UP (ref 7–13)
POTASSIUM SERPL-MCNC: 4.3 MMOL/L — SIGNIFICANT CHANGE UP (ref 3.5–5.3)
POTASSIUM SERPL-SCNC: 4.3 MMOL/L — SIGNIFICANT CHANGE UP (ref 3.5–5.3)
RBC # BLD: 3.47 M/UL — LOW (ref 3.8–5.2)
RBC # FLD: 17.3 % — HIGH (ref 10.3–14.5)
SODIUM SERPL-SCNC: 142 MMOL/L — SIGNIFICANT CHANGE UP (ref 135–145)
WBC # BLD: 10.21 K/UL — SIGNIFICANT CHANGE UP (ref 3.8–10.5)
WBC # FLD AUTO: 10.21 K/UL — SIGNIFICANT CHANGE UP (ref 3.8–10.5)

## 2018-12-16 PROCEDURE — 99254 IP/OBS CNSLTJ NEW/EST MOD 60: CPT | Mod: GC

## 2018-12-16 RX ADMIN — LOSARTAN POTASSIUM 25 MILLIGRAM(S): 100 TABLET, FILM COATED ORAL at 05:53

## 2018-12-16 RX ADMIN — PIPERACILLIN AND TAZOBACTAM 25 GRAM(S): 4; .5 INJECTION, POWDER, LYOPHILIZED, FOR SOLUTION INTRAVENOUS at 22:06

## 2018-12-16 RX ADMIN — PIPERACILLIN AND TAZOBACTAM 25 GRAM(S): 4; .5 INJECTION, POWDER, LYOPHILIZED, FOR SOLUTION INTRAVENOUS at 05:53

## 2018-12-16 RX ADMIN — SODIUM CHLORIDE 35 MILLILITER(S): 9 INJECTION, SOLUTION INTRAVENOUS at 13:03

## 2018-12-16 RX ADMIN — PIPERACILLIN AND TAZOBACTAM 25 GRAM(S): 4; .5 INJECTION, POWDER, LYOPHILIZED, FOR SOLUTION INTRAVENOUS at 15:55

## 2018-12-16 RX ADMIN — ATORVASTATIN CALCIUM 20 MILLIGRAM(S): 80 TABLET, FILM COATED ORAL at 22:06

## 2018-12-16 NOTE — CONSULT NOTE ADULT - ATTENDING COMMENTS
70F with perf diverticulitis, abd abscess, sp IR drainage with polymicrobial bacterial infection   -cont zosyn 3.375 gm iv q8  -f/u final cx  -likely planning cefpodoxime and flagyl PO on DC  -toerlaing PO so far    Vaughn Beltran  Attending Physician   Division of Infectious Disease  Pager #[phone]  After 5pm/weekend or no response, call #[phone]

## 2018-12-16 NOTE — CONSULT NOTE ADULT - SUBJECTIVE AND OBJECTIVE BOX
HPI:  62 yo woman with a hx of HTN, and uterine fibroids s/p remote fibroidectomy, and diverticulitis with recent admission (10/31-11/04) for perforated diverticulitis, being treated with oral antibiotics and low fiber diet as an outpatient but noticed persistent fevers prompting a CT scan yesterday which revealed a diverticular abscess in the region of the uterus.    Patient denies any pain. She has lost weight over the past few months which she attributes to dietary changes. She reports regular, formed bowel movements. She denies chills, N/V. (13 Dec 2018 22:41)      PAST MEDICAL & SURGICAL HISTORY:  Hypertension  Diverticulitis  Fibroid uterus      Allergies    No Known Allergies    Intolerances        ANTIMICROBIALS:  piperacillin/tazobactam IVPB. 3.375 every 8 hours      OTHER MEDS:  atorvastatin 20 milliGRAM(s) Oral at bedtime  dextrose 5% + sodium chloride 0.45%. 1000 milliLiter(s) IV Continuous <Continuous>  enoxaparin Injectable 40 milliGRAM(s) SubCutaneous daily  losartan 25 milliGRAM(s) Oral daily      SOCIAL HISTORY:    Marital Status:    Occupation:   Lives with:     Substance Use (street drugs):   Tobacco Usage:    Alcohol Usage: Social EtOH    FAMILY HISTORY:  No pertinent family history in first degree relatives      ROS:  Unobtainable because:   All other systems negative     Constitutional: no fever, no chills, no weight loss, no night sweats  Eye: no eye pain, no redness, no vision changes  ENT:  no sore throat, no rhinorrhea  Cardiovascular:  no chest pain, no palpitation  Respiratory:  no SOB, no cough  GI:  no abd pain, no vomiting, no diarrhea  urinary: no dysuria, no hematuria, no flank pain  : no  discharge or bleeding  musculoskeletal:  no joint pain, no joint swelling  skin:  no rash  neurology:  no headache, no seizure, no change in mental status  psych: no anxiety, no depression     Physical Exam:    General:    NAD, non toxic  Head: atraumatic, normocephalic  Eyes: normal sclera and conjunctiva  ENT:   no oropharyngeal lesions, no LAD, neck supple  Cardio:    regular S1,S2, no murmur  Respiratory:   clear b/l, no wheezing  abd:   soft, BS +, not tender, no hepatosplenomegaly  :     no CVAT, no suprapubic tenderness, no drew  Musculoskeletal : no joint swelling, no edema  Skin:    no rash  vascular: no lines, normal pulses  Neurologic:     no focal deficits  psych: normal affect, no suicidal ideation      Drug Dosing Weight  Height (cm): 152.4 (01 Nov 2018 17:37)  Weight (kg): 99.8 (01 Nov 2018 17:37)  BMI (kg/m2): 43 (01 Nov 2018 17:37)  BSA (m2): 1.94 (01 Nov 2018 17:37)    Vital Signs Last 24 Hrs  T(F): 97.9 (12-16-18 @ 11:41), Max: 99.4 (12-16-18 @ 05:51)    Vital Signs Last 24 Hrs  HR: 83 (12-16-18 @ 11:41) (83 - 84)  BP: 117/63 (12-16-18 @ 11:41) (117/63 - 126/61)  RR: 16 (12-16-18 @ 11:41)  SpO2: 100% (12-16-18 @ 11:41) (98% - 100%)  Wt(kg): --                          8.8    10.21 )-----------( 403      ( 16 Dec 2018 06:05 )             29.3       12-16    142  |  102  |  10  ----------------------------<  104<H>  4.3   |  25  |  0.99    Ca    9.2      16 Dec 2018 06:05  Phos  3.6     12-16  Mg     2.0     12-16            MICROBIOLOGY:  v  ABDOMEN  12-14-18 --  --  Escherichia coli  Bacteroides vulgatus      BLOOD PERIPHERAL  12-13-18 --  --  --                  RADIOLOGY: HPI: 64 yo woman with a hx of HTN, and uterine fibroids s/p remote fibroidectomy, and diverticulitis with recent admission (10/31-11/04) for perforated diverticulitis sent in by surgeon for abd abscess. Pt admitted 10/31 to 11/4 for diverticulitis w perforation. No surgical management performed at that time and pt was dc on augmentin. Pt finished her abx and felt well until this past monday  12/10 when she had fever 101.8F. Next day she saw her surgeon who ordered ct a/p which she received on Thurs. CT a/p came back, per surgical team, as diverticular abscess so pt was sent to ED. On 12/14 pt gretchen to IR for drainage of abscess. Abscess cultures growing E.coli and bacteroides.    Pt feels well currently. Denies chills, cough, rhinorrhea, sore throat, cp, abd pain, n/v, diarrhea, dysuria.       PAST MEDICAL & SURGICAL HISTORY:  Hypertension  Diverticulitis  Fibroid uterus      Allergies    No Known Allergies    Intolerances        ANTIMICROBIALS:  piperacillin/tazobactam IVPB. 3.375 every 8 hours      OTHER MEDS:  atorvastatin 20 milliGRAM(s) Oral at bedtime  dextrose 5% + sodium chloride 0.45%. 1000 milliLiter(s) IV Continuous <Continuous>  enoxaparin Injectable 40 milliGRAM(s) SubCutaneous daily  losartan 25 milliGRAM(s) Oral daily      SOCIAL HISTORY:  Substance Use (street drugs): denies  Tobacco Usage:  denies  Alcohol Usage: denies    FAMILY HISTORY:  No pertinent family history in first degree relatives      ROS:  All other systems negative     Constitutional:  fever  Eye: no eye pain, no redness, no vision changes  ENT:  no sore throat, no rhinorrhea  Cardiovascular:  no chest pain, no palpitation  Respiratory:  no SOB, no cough  GI:  no abd pain, no vomiting, no diarrhea  urinary: no dysuria, no hematuria, no flank pain  : no  discharge or bleeding  musculoskeletal:  no joint pain, no joint swelling  skin:  no rash  neurology:  no headache    Physical Exam:    General:    NAD, non toxic  Head: atraumatic, normocephalic  Eyes: normal sclera and conjunctiva  ENT:   no oropharyngeal lesions  Cardio:    regular S1,S2, no murmur  Respiratory:   clear b/l, no wheezing  abd:   soft, BS +, not tender, no hepatosplenomegaly, IR drain in place  :     no CVAT, no suprapubic tenderness, no drew  Musculoskeletal : no joint swelling, no edema  Skin:    no rash  vascular:  normal pulses  Neurologic:     no focal deficits  psych: normal affect    Drug Dosing Weight  Height (cm): 152.4 (01 Nov 2018 17:37)  Weight (kg): 99.8 (01 Nov 2018 17:37)  BMI (kg/m2): 43 (01 Nov 2018 17:37)  BSA (m2): 1.94 (01 Nov 2018 17:37)    Vital Signs Last 24 Hrs  T(F): 97.9 (12-16-18 @ 11:41), Max: 99.4 (12-16-18 @ 05:51)    Vital Signs Last 24 Hrs  HR: 83 (12-16-18 @ 11:41) (83 - 84)  BP: 117/63 (12-16-18 @ 11:41) (117/63 - 126/61)  RR: 16 (12-16-18 @ 11:41)  SpO2: 100% (12-16-18 @ 11:41) (98% - 100%)  Wt(kg): --                          8.8    10.21 )-----------( 403      ( 16 Dec 2018 06:05 )             29.3       12-16    142  |  102  |  10  ----------------------------<  104<H>  4.3   |  25  |  0.99    Ca    9.2      16 Dec 2018 06:05  Phos  3.6     12-16  Mg     2.0     12-16            MICROBIOLOGY:    ABDOMEN  12-14-18 --  --  Escherichia coli  Bacteroides vulgatus (prelim)      BLOOD PERIPHERAL  12-13-18 --  --  --neg to date            RADIOLOGY:    No imaging

## 2018-12-16 NOTE — CONSULT NOTE ADULT - ASSESSMENT
64 yo woman with a hx of HTN, and uterine fibroids s/p remote fibroidectomy, and diverticulitis with recent admission (10/31-11/04) for perforated diverticulitis sent in by surgeon for abd abscess s/p IR drainage 12/14.    Abd cultures- E.coli, bacteroides vulgatus (cultures not finalized)    - monitor abd cultures  - monitor Bcx  - c/w zosyn  - ability to change to PO and abx duration to be determined based on final cx results and pt's clinical course

## 2018-12-17 ENCOUNTER — TRANSCRIPTION ENCOUNTER (OUTPATIENT)
Age: 63
End: 2018-12-17

## 2018-12-17 VITALS
HEART RATE: 69 BPM | RESPIRATION RATE: 18 BRPM | TEMPERATURE: 98 F | OXYGEN SATURATION: 98 % | SYSTOLIC BLOOD PRESSURE: 121 MMHG | DIASTOLIC BLOOD PRESSURE: 52 MMHG

## 2018-12-17 DIAGNOSIS — A49.9 BACTERIAL INFECTION, UNSPECIFIED: ICD-10-CM

## 2018-12-17 DIAGNOSIS — K57.20 DIVERTICULITIS OF LARGE INTESTINE WITH PERFORATION AND ABSCESS WITHOUT BLEEDING: ICD-10-CM

## 2018-12-17 LAB
BUN SERPL-MCNC: 15 MG/DL — SIGNIFICANT CHANGE UP (ref 7–23)
CALCIUM SERPL-MCNC: 9.8 MG/DL — SIGNIFICANT CHANGE UP (ref 8.4–10.5)
CHLORIDE SERPL-SCNC: 98 MMOL/L — SIGNIFICANT CHANGE UP (ref 98–107)
CO2 SERPL-SCNC: 27 MMOL/L — SIGNIFICANT CHANGE UP (ref 22–31)
CREAT SERPL-MCNC: 1.16 MG/DL — SIGNIFICANT CHANGE UP (ref 0.5–1.3)
GLUCOSE SERPL-MCNC: 112 MG/DL — HIGH (ref 70–99)
HCT VFR BLD CALC: 32.8 % — LOW (ref 34.5–45)
HGB BLD-MCNC: 9.6 G/DL — LOW (ref 11.5–15.5)
MAGNESIUM SERPL-MCNC: 2 MG/DL — SIGNIFICANT CHANGE UP (ref 1.6–2.6)
MCHC RBC-ENTMCNC: 25.4 PG — LOW (ref 27–34)
MCHC RBC-ENTMCNC: 29.3 % — LOW (ref 32–36)
MCV RBC AUTO: 86.8 FL — SIGNIFICANT CHANGE UP (ref 80–100)
NRBC # FLD: 0 — SIGNIFICANT CHANGE UP
ORGANISM # SPEC MICROSCOPIC CNT: SIGNIFICANT CHANGE UP
ORGANISM # SPEC MICROSCOPIC CNT: SIGNIFICANT CHANGE UP
PHOSPHATE SERPL-MCNC: 3.2 MG/DL — SIGNIFICANT CHANGE UP (ref 2.5–4.5)
PLATELET # BLD AUTO: 425 K/UL — HIGH (ref 150–400)
PMV BLD: 8.6 FL — SIGNIFICANT CHANGE UP (ref 7–13)
POTASSIUM SERPL-MCNC: 3.7 MMOL/L — SIGNIFICANT CHANGE UP (ref 3.5–5.3)
POTASSIUM SERPL-SCNC: 3.7 MMOL/L — SIGNIFICANT CHANGE UP (ref 3.5–5.3)
RBC # BLD: 3.78 M/UL — LOW (ref 3.8–5.2)
RBC # FLD: 17.2 % — HIGH (ref 10.3–14.5)
SODIUM SERPL-SCNC: 140 MMOL/L — SIGNIFICANT CHANGE UP (ref 135–145)
WBC # BLD: 7.38 K/UL — SIGNIFICANT CHANGE UP (ref 3.8–10.5)
WBC # FLD AUTO: 7.38 K/UL — SIGNIFICANT CHANGE UP (ref 3.8–10.5)

## 2018-12-17 PROCEDURE — 99232 SBSQ HOSP IP/OBS MODERATE 35: CPT

## 2018-12-17 PROCEDURE — 99233 SBSQ HOSP IP/OBS HIGH 50: CPT

## 2018-12-17 RX ORDER — POTASSIUM CHLORIDE 20 MEQ
40 PACKET (EA) ORAL ONCE
Qty: 0 | Refills: 0 | Status: COMPLETED | OUTPATIENT
Start: 2018-12-17 | End: 2018-12-17

## 2018-12-17 RX ORDER — AZTREONAM 2 G
1 VIAL (EA) INJECTION
Qty: 20 | Refills: 0 | OUTPATIENT
Start: 2018-12-17 | End: 2018-12-26

## 2018-12-17 RX ADMIN — Medication 40 MILLIEQUIVALENT(S): at 14:16

## 2018-12-17 RX ADMIN — PIPERACILLIN AND TAZOBACTAM 25 GRAM(S): 4; .5 INJECTION, POWDER, LYOPHILIZED, FOR SOLUTION INTRAVENOUS at 06:13

## 2018-12-17 RX ADMIN — LOSARTAN POTASSIUM 25 MILLIGRAM(S): 100 TABLET, FILM COATED ORAL at 06:14

## 2018-12-17 NOTE — DISCHARGE NOTE ADULT - CARE PROVIDER_API CALL
Ben Puckett), ColonRectal Surgery; Surgery  [facility]  [address] [place] [id number]  Phone: [phone]  Fax: [phone]    Vaughn Beltran; MBBS), Infectious Disease; Internal Medicine  [address] [place] [address]  Phone: [phone]  Fax: [phone]

## 2018-12-17 NOTE — DISCHARGE NOTE ADULT - CARE PROVIDERS DIRECT ADDRESSES
[email or web address]

## 2018-12-17 NOTE — PROGRESS NOTE ADULT - ASSESSMENT
64 yo woman with a hx of HTN, and uterine fibroids s/p remote fibroidectomy, and diverticulitis with recent admission (10/31-11/04) for perforated diverticulitis, persistent fevers as an outpatient, found to have diverticular abscess in the region of the uterus. S/p IR drainage of diverticular abscess on 12/14 with drain placement.    PLAN:  - Pain management.  - ID c/s for recs on home abx plan after IR drain cultures - currently growing multi-drug resistant E. coli and bacteriodes vulgatus. Sensitive to zosyn, so will c/w zosyn for now until abx plan from ID for home.  - On LRD, emeli well.  - OOB as tolerated.   - IS.
62 yo woman with a hx of HTN, and uterine fibroids s/p remote fibroidectomy, and diverticulitis with recent admission (10/31-11/04) for perforated diverticulitis, persistent fevers as an outpatient, found to have diverticular abscess in the region of the uterus.    - NPO/IVF.  - IV abx: zosyn.  - Consult IR for possible drainage of pelvic abscess.  - DVT PPx: lovenox
64 yo woman with a hx of HTN, and uterine fibroids s/p remote fibroidectomy, and diverticulitis with recent admission (10/31-11/04) for perforated diverticulitis, persistent fevers as an outpatient, found to have diverticular abscess in the region of the uterus. S/p IR drainage of diverticular abscess on 12/14 with drain placement.    PLAN:  - Pain management.  - Continue zosyn for now until cx from IR drainage result. Gram stain currently with gram - rods, gram + rods, moderate E. coli.  - Advance to [facility].  - Follow UOP.  - OOB as tolerated.   - IS.
62 yo woman with a hx of HTN, and uterine fibroids s/p remote fibroidectomy, and diverticulitis with recent admission (10/31-11/04) for perforated diverticulitis sent in by surgeon for abd abscess s/p IR drainage 12/14.    Abd cultures- E.coli, bacteroides vulgatus (cultures not finalized)    - monitor abd cultures  - monitor Bcx  - c/w zosyn  - ability to change to PO and abx duration to be determined based on final cx results and pt's clinical course

## 2018-12-17 NOTE — DISCHARGE NOTE ADULT - HOSPITAL COURSE
64 yo woman with a hx of HTN, and uterine fibroids s/p remote fibroidectomy, and diverticulitis with recent admission (10/31-11/04) for perforated diverticulitis, being treated with oral antibiotics and low fiber diet as an outpatient but noticed persistent fevers prompting a CT scan yesterday which revealed a diverticular abscess in the region of the uterus.    Patient denies any pain. She has lost weight over the past few months which she attributes to dietary changes. She reports regular, formed bowel movements. She denies chills, N/V.    Pt admitted to general surgery service with diverticulitis and managed nonoperatively.  Interventional radiology consulted for drainage of abscess and pt went for drainage on 12/14.  Pt tolerated procedure well, without complication.  PATO teaching provided.  Diet restarted and advanced as tolerated.  ID consulted for antibiotic plan.  Pt currently ambulating, voiding, tolerating a regular diet, with pain controlled on PO pain meds. Per team and attending, pt is hemodynamically stable for discharge home with drain to follow up as an outpatient with Dr. Puckett, Dr. Beltran, and interventional radiology.

## 2018-12-17 NOTE — PROGRESS NOTE ADULT - ATTENDING COMMENTS
Perforated diverticulitis  -IR drain today  -Clears to diet as tolerated  -Pt will need outpt/election sigmoid resection
Vaughn Beltran  Attending Physician   Division of Infectious Disease  Pager #[phone]  After 5pm/weekend or no response, call #[phone]

## 2018-12-17 NOTE — DISCHARGE NOTE ADULT - MEDICATION SUMMARY - MEDICATIONS TO TAKE
I will START or STAY ON the medications listed below when I get home from the hospital:    losartan 25 mg oral tablet  -- 1 tab(s) by mouth once a day  -- Indication: For HTN    fluvastatin 80 mg oral tablet, extended release  -- 1 tab(s) by mouth once a day  -- Indication: For HLD    labetalol 100 mg oral tablet  -- 1 tab(s) by mouth 2 times a day  -- Indication: For HTN    indapamide 2.5 mg oral tablet  -- 1 tab(s) by mouth once a day (in the morning)  -- Indication: For HTN    potassium chloride 10 mEq oral tablet, extended release  -- 1 tab(s) by mouth once a day  -- Indication: For Electrolyte    Augmentin 875 mg-125 mg oral tablet  -- 1 tab(s) by mouth every 12 hours   -- Finish all this medication unless otherwise directed by prescriber.  Take with food or milk.    -- Indication: For Diverticulitis    Bactrim  mg-160 mg oral tablet  -- 1 tab(s) by mouth 2 times a day   -- Avoid prolonged or excessive exposure to direct and/or artificial sunlight while taking this medication.  Finish all this medication unless otherwise directed by prescriber.  Medication should be taken with plenty of water.    -- Indication: For Diverticulitis

## 2018-12-17 NOTE — PROGRESS NOTE ADULT - REASON FOR ADMISSION
Diverticular abscess

## 2018-12-17 NOTE — PROGRESS NOTE ADULT - SUBJECTIVE AND OBJECTIVE BOX
A Team Surgery    SUBJECTIVE: Pt seen and examined at bedside.  Pt denies pain or any other complaint at this time.  Pt taught drain care and is Ok with discharge planning.  Tolerating a regular diet.      OBJECTIVE: T(C): 36.8 (12-17-18 @ 11:30), Max: 37.1 (12-16-18 @ 22:01)  HR: 69 (12-17-18 @ 11:30) (69 - 82)  BP: 121/52 (12-17-18 @ 11:30) (117/53 - 124/65)  RR: 18 (12-17-18 @ 11:30) (16 - 18)  SpO2: 98% (12-17-18 @ 11:30) (95% - 100%)  Wt(kg): --  I&O's Summary    16 Dec 2018 07:01  -  17 Dec 2018 07:00  --------------------------------------------------------  IN: 550 mL / OUT: 890 mL / NET: -340 mL      I&O's Detail    16 Dec 2018 07:01  -  17 Dec 2018 07:00  --------------------------------------------------------  IN:    dextrose 5% + sodium chloride 0.45%.: 350 mL    IV PiggyBack: 200 mL  Total IN: 550 mL    OUT:    Bulb: 40 mL    Voided: 850 mL  Total OUT: 890 mL    Total NET: -340 mL        General:  Abdomen:    MEDICATIONS  (STANDING):  atorvastatin 20 milliGRAM(s) Oral at bedtime  dextrose 5% + sodium chloride 0.45%. 1000 milliLiter(s) (35 mL/Hr) IV Continuous <Continuous>  enoxaparin Injectable 40 milliGRAM(s) SubCutaneous daily  losartan 25 milliGRAM(s) Oral daily  piperacillin/tazobactam IVPB. 3.375 Gram(s) IV Intermittent every 8 hours  potassium chloride    Tablet ER 40 milliEquivalent(s) Oral once    MEDICATIONS  (PRN):      LABS:                        9.6    7.38  )-----------( 425      ( 17 Dec 2018 10:36 )             32.8     12-17    140  |  98  |  15  ----------------------------<  112<H>  3.7   |  27  |  1.16    Ca    9.8      17 Dec 2018 10:36  Phos  3.2     12-17  Mg     2.0     12-17            RADIOLOGY & ADDITIONAL STUDIES:    Assessment/Plan: 64 yo woman with a hx of HTN, and uterine fibroids s/p remote fibroidectomy, and diverticulitis with recent admission (10/31-11/04) for perforated diverticulitis, persistent fevers as an outpatient, found to have diverticular abscess in the region of the uterus. S/p IR drainage of diverticular abscess on 12/14 with drain placement.    PLAN:  - Pain management.  - d/u ID final recs  - On LRD, emeli well.  - OOB as tolerated.   - IS.  -D/c planning today on PO abx
Surgery Progress Note    24hr events: IR drainage of a diverticular abscess yesterday - 35cc of purulent fluid aspirated and sent for culture. Gram stain with gram - rods, gram + rods, moderate E. coli.    SUBJECTIVE: Pt seen and examined at bedside. Patient comfortable and in no-apparent distress. No nausea, vomiting, diarrhea. Pain is controlled.     Vital Signs Last 24 Hrs  T(C): 36.9 (15 Dec 2018 06:16), Max: 37 (14 Dec 2018 22:31)  T(F): 98.5 (15 Dec 2018 06:16), Max: 98.6 (14 Dec 2018 22:31)  HR: 79 (15 Dec 2018 06:16) (79 - 85)  BP: 119/62 (15 Dec 2018 06:16) (119/62 - 129/67)  BP(mean): --  RR: 18 (15 Dec 2018 06:16) (18 - 19)  SpO2: 95% (15 Dec 2018 06:16) (95% - 97%)    Physical Exam:  Gen: in NAD  Abd: soft, NT, ND, PATO drain in LLQ with SS output    LABS:                        8.5    6.81  )-----------( 395      ( 15 Dec 2018 05:35 )             28.5     12-15    142  |  100  |  12  ----------------------------<  110<H>  3.4<L>   |  27  |  0.92    Ca    9.2      15 Dec 2018 05:35  Phos  4.5     12-15  Mg     1.9     12-15    TPro  8.5<H>  /  Alb  3.8  /  TBili  0.2  /  DBili  x   /  AST  17  /  ALT  14  /  AlkPhos  79  12-13          INs and OUTs:    12-14-18 @ 07:01  -  12-15-18 @ 07:00  --------------------------------------------------------  IN: 0 mL / OUT: 60 mL / NET: -60 mL
Surgery Progress Note    24hr events: admitted overnight for persistent fevers as an outpatient, found to have diverticular abscess in the region of the uterus.    SUBJECTIVE: Pt seen and examined at bedside. Patient comfortable and in no-apparent distress. No nausea, vomiting, diarrhea. Pain is controlled. +Just describes uncomfortable feeling but no pain.    Vital Signs Last 24 Hrs  T(C): 36.9 (14 Dec 2018 05:23), Max: 37.1 (13 Dec 2018 15:02)  T(F): 98.4 (14 Dec 2018 05:23), Max: 98.8 (13 Dec 2018 15:02)  HR: 74 (14 Dec 2018 05:23) (74 - 92)  BP: 122/68 (14 Dec 2018 05:23) (122/68 - 145/76)  BP(mean): --  RR: 18 (14 Dec 2018 05:23) (16 - 18)  SpO2: 100% (14 Dec 2018 05:23) (98% - 100%)    Physical Exam:  Gen: in NAD  Abd: soft, NT, ND    LABS:                        9.8    8.89  )-----------( 515      ( 13 Dec 2018 18:27 )             33.2     12-13    139  |  97<L>  |  20  ----------------------------<  100<H>  3.7   |  29  |  0.88    Ca    9.7      13 Dec 2018 18:27    TPro  8.5<H>  /  Alb  3.8  /  TBili  0.2  /  DBili  x   /  AST  17  /  ALT  14  /  AlkPhos  79  12-13          INs and OUTs:
Surgery Progress Note    SUBJECTIVE: Pt seen and examined at bedside. Patient comfortable and in no-apparent distress. No nausea, vomiting, diarrhea. Pain is controlled.     Vital Signs Last 24 Hrs  T(C): 36.6 (16 Dec 2018 11:41), Max: 37.4 (16 Dec 2018 05:51)  T(F): 97.9 (16 Dec 2018 11:41), Max: 99.4 (16 Dec 2018 05:51)  HR: 83 (16 Dec 2018 11:41) (83 - 84)  BP: 117/63 (16 Dec 2018 11:41) (117/63 - 126/61)  BP(mean): --  RR: 16 (16 Dec 2018 11:41) (16 - 16)  SpO2: 100% (16 Dec 2018 11:41) (98% - 100%)    Physical Exam:  Gen: in NAD  Abd: soft, NT, ND, PATO drain in LLQ with SS output    LABS:                        8.8    10.21 )-----------( 403      ( 16 Dec 2018 06:05 )             29.3     12-16    142  |  102  |  10  ----------------------------<  104<H>  4.3   |  25  |  0.99    Ca    9.2      16 Dec 2018 06:05  Phos  3.6     12-16  Mg     2.0     12-16            INs and OUTs:    12-15-18 @ 07:01  -  12-16-18 @ 07:00  --------------------------------------------------------  IN: 0 mL / OUT: 50 mL / NET: -50 mL    12-16-18 @ 07:01  -  12-16-18 @ 15:46  --------------------------------------------------------  IN: 0 mL / OUT: 320 mL / NET: -320 mL
MONA BENITEZ 63y [id number]    Patient is a 63y old  Female who presents with a chief complaint of Diverticular abscess (16 Dec 2018 16:23)      Follow Up/CC:  ID following for abscess    Interval History/ROS: feels well, no fever    Allergies    No Known Allergies    Intolerances        ANTIMICROBIALS:  piperacillin/tazobactam IVPB. 3.375 every 8 hours      MEDICATIONS  (STANDING):  atorvastatin 20 milliGRAM(s) Oral at bedtime  dextrose 5% + sodium chloride 0.45%. 1000 milliLiter(s) (35 mL/Hr) IV Continuous <Continuous>  enoxaparin Injectable 40 milliGRAM(s) SubCutaneous daily  losartan 25 milliGRAM(s) Oral daily  piperacillin/tazobactam IVPB. 3.375 Gram(s) IV Intermittent every 8 hours  potassium chloride    Tablet ER 40 milliEquivalent(s) Oral once    MEDICATIONS  (PRN):        Vital Signs Last 24 Hrs  T(C): 36.8 (17 Dec 2018 11:30), Max: 37.1 (16 Dec 2018 22:01)  T(F): 98.3 (17 Dec 2018 11:30), Max: 98.7 (16 Dec 2018 22:01)  HR: 69 (17 Dec 2018 11:30) (69 - 82)  BP: 121/52 (17 Dec 2018 11:30) (117/53 - 124/65)  BP(mean): --  RR: 18 (17 Dec 2018 11:30) (16 - 18)  SpO2: 98% (17 Dec 2018 11:30) (95% - 100%)    CBC Full  -  ( 17 Dec 2018 10:36 )  WBC Count : 7.38 K/uL  Hemoglobin : 9.6 g/dL  Hematocrit : 32.8 %  Platelet Count - Automated : 425 K/uL  Mean Cell Volume : 86.8 fL  Mean Cell Hemoglobin : 25.4 pg  Mean Cell Hemoglobin Concentration : 29.3 %  Auto Neutrophil # : x  Auto Lymphocyte # : x  Auto Monocyte # : x  Auto Eosinophil # : x  Auto Basophil # : x  Auto Neutrophil % : x  Auto Lymphocyte % : x  Auto Monocyte % : x  Auto Eosinophil % : x  Auto Basophil % : x    12-17    140  |  98  |  15  ----------------------------<  112<H>  3.7   |  27  |  1.16    Ca    9.8      17 Dec 2018 10:36  Phos  3.2     12-17  Mg     2.0     12-17            MICROBIOLOGY:  ABDOMEN  12-14-18 --  --  Escherichia coli  Bacteroides vulgatus  Enterococcus avium      BLOOD PERIPHERAL  12-13-18 --  --  --      RADIOLOGY    IR Procedure (12.14.18 @ 14:51) >  Preliminary CT was obtained. Under CT guidance, an 18-gauge trocar needle   was advanced into the left pelvic collection using a left anterior   approach. The needle was exchanged over an axis wire for a 10.2 [place]   pigtail drainage catheter and the cavity was manually aspirated.

## 2018-12-17 NOTE — PROGRESS NOTE ADULT - PROBLEM SELECTOR PLAN 1
-improved  -bactrim DS 1 tab po bid + augmentin 875 mg po bid x 10 days  -IR tube check as outpatient  -potential side effects of abx explained including GI, Cdiff, allergy issues, development of resistance, etc.  -can f/u with me in 7-10 days [phone]

## 2018-12-17 NOTE — DISCHARGE NOTE ADULT - PLAN OF CARE
normal GI function, drain and antibiotics Please follow up with Interventional radiology to have your drain evaluated one week from discharge.  Please call today, to schedule an appointment (for one week from discharge date) [phone].  Continue to empty and record the drain output daily as you have been taught.   BATHING: Please do not submerge wound underwater. You may shower and/or sponge bathe. It is OK to wash drain wound site.  ACTIVITY: No heavy lifting or straining. Otherwise, you may return to your usual level of physical activity. If you are taking narcotic pain medication (such as Percocet) DO NOT drive a car, operate machinery or make important decisions.  DIET: Return to your usual diet.  NOTIFY YOUR SURGEON IF: You have any bleeding that does not stop, any pus draining from your wound(s), any fever (over 100.4 F) or chills, persistent nausea/vomiting, persistent diarrhea, or if your pain is not controlled on your discharge pain medications.  FOLLOW-UP: Please follow up with your primary care physician in one week regarding your hospitalization.  Please follow up with interventional radiology, call to schedule an appointment.  Follow up with Dr. Beltran (ID) after [place].  Call to schedule an appointment.  Please follow up with Dr. Puckett in one week.  Call to schedule an appointment.

## 2018-12-17 NOTE — DISCHARGE NOTE ADULT - MEDICATION SUMMARY - MEDICATIONS TO CHANGE
I will SWITCH the dose or number of times a day I take the medications listed below when I get home from the hospital:    Augmentin 875 mg-125 mg oral tablet  -- 1 tab(s) by mouth 2 times a day MDD:2  -- Finish all this medication unless otherwise directed by prescriber.  Take with food or milk.

## 2018-12-17 NOTE — DISCHARGE NOTE ADULT - PATIENT PORTAL LINK FT
You can access the [facility] Patient Portal, offered by [facility], by registering with the following website: [email or web address]

## 2018-12-17 NOTE — DISCHARGE NOTE ADULT - CARE PLAN
Principal Discharge DX:	Diverticulitis  Goal:	normal GI function, drain and antibiotics  Assessment and plan of treatment:	Please follow up with Interventional radiology to have your drain evaluated one week from discharge.  Please call today, to schedule an appointment (for one week from discharge date) [phone].  Continue to empty and record the drain output daily as you have been taught.   BATHING: Please do not submerge wound underwater. You may shower and/or sponge bathe. It is OK to wash drain wound site.  ACTIVITY: No heavy lifting or straining. Otherwise, you may return to your usual level of physical activity. If you are taking narcotic pain medication (such as Percocet) DO NOT drive a car, operate machinery or make important decisions.  DIET: Return to your usual diet.  NOTIFY YOUR SURGEON IF: You have any bleeding that does not stop, any pus draining from your wound(s), any fever (over 100.4 F) or chills, persistent nausea/vomiting, persistent diarrhea, or if your pain is not controlled on your discharge pain medications.  FOLLOW-UP: Please follow up with your primary care physician in one week regarding your hospitalization.  Please follow up with interventional radiology, call to schedule an appointment.  Follow up with Dr. Beltran (ID) after [place].  Call to schedule an appointment.  Please follow up with Dr. Puckett in one week.  Call to schedule an appointment.

## 2018-12-18 LAB
-  AMIKACIN: SIGNIFICANT CHANGE UP
-  AMPICILLIN/SULBACTAM: SIGNIFICANT CHANGE UP
-  AMPICILLIN: SIGNIFICANT CHANGE UP
-  AMPICILLIN: SIGNIFICANT CHANGE UP
-  AZTREONAM: SIGNIFICANT CHANGE UP
-  CEFAZOLIN: SIGNIFICANT CHANGE UP
-  CEFEPIME: SIGNIFICANT CHANGE UP
-  CEFOXITIN: SIGNIFICANT CHANGE UP
-  CEFTAZIDIME: SIGNIFICANT CHANGE UP
-  CEFTRIAXONE: SIGNIFICANT CHANGE UP
-  CIPROFLOXACIN: SIGNIFICANT CHANGE UP
-  CIPROFLOXACIN: SIGNIFICANT CHANGE UP
-  ERTAPENEM: SIGNIFICANT CHANGE UP
-  GENTAMICIN: SIGNIFICANT CHANGE UP
-  IMIPENEM: SIGNIFICANT CHANGE UP
-  LEVOFLOXACIN: SIGNIFICANT CHANGE UP
-  MEROPENEM: SIGNIFICANT CHANGE UP
-  PIPERACILLIN/TAZOBACTAM: SIGNIFICANT CHANGE UP
-  TETRACYCLINE: SIGNIFICANT CHANGE UP
-  TIGECYCLINE: SIGNIFICANT CHANGE UP
-  TOBRAMYCIN: SIGNIFICANT CHANGE UP
-  TRIMETHOPRIM/SULFAMETHOXAZOLE: SIGNIFICANT CHANGE UP
-  VANCOMYCIN: SIGNIFICANT CHANGE UP
BACTERIA BLD CULT: SIGNIFICANT CHANGE UP
BACTERIA BLD CULT: SIGNIFICANT CHANGE UP
CULTURE - SURGICAL SITE: SIGNIFICANT CHANGE UP
METHOD TYPE: SIGNIFICANT CHANGE UP

## 2018-12-20 ENCOUNTER — APPOINTMENT (OUTPATIENT)
Dept: COLORECTAL SURGERY | Facility: CLINIC | Age: 63
End: 2018-12-20
Payer: COMMERCIAL

## 2018-12-20 LAB — SPECIMEN SOURCE: SIGNIFICANT CHANGE UP

## 2018-12-20 PROCEDURE — 99213 OFFICE O/P EST LOW 20 MIN: CPT

## 2018-12-20 RX ORDER — SULFAMETHOXAZOLE AND TRIMETHOPRIM 800; 160 MG/1; MG/1
TABLET ORAL
Refills: 0 | Status: ACTIVE | COMMUNITY

## 2018-12-20 RX ORDER — CLINDAMYCIN HYDROCHLORIDE 300 MG/1
300 CAPSULE ORAL
Refills: 0 | Status: DISCONTINUED | COMMUNITY
End: 2018-12-20

## 2019-01-02 ENCOUNTER — FORM ENCOUNTER (OUTPATIENT)
Age: 64
End: 2019-01-02

## 2019-01-03 ENCOUNTER — APPOINTMENT (OUTPATIENT)
Dept: CT IMAGING | Facility: HOSPITAL | Age: 64
End: 2019-01-03

## 2019-01-03 ENCOUNTER — OUTPATIENT (OUTPATIENT)
Dept: OUTPATIENT SERVICES | Facility: HOSPITAL | Age: 64
LOS: 1 days | End: 2019-01-03
Payer: COMMERCIAL

## 2019-01-03 DIAGNOSIS — N73.9 FEMALE PELVIC INFLAMMATORY DISEASE, UNSPECIFIED: ICD-10-CM

## 2019-01-03 DIAGNOSIS — D25.9 LEIOMYOMA OF UTERUS, UNSPECIFIED: Chronic | ICD-10-CM

## 2019-01-03 PROCEDURE — 72192 CT PELVIS W/O DYE: CPT | Mod: 26

## 2019-01-03 PROCEDURE — 49424 ASSESS CYST CONTRAST INJECT: CPT

## 2019-01-03 PROCEDURE — 76080 X-RAY EXAM OF FISTULA: CPT | Mod: 26

## 2019-01-08 DIAGNOSIS — Z43.4 ENCOUNTER FOR ATTENTION TO OTHER ARTIFICIAL OPENINGS OF DIGESTIVE TRACT: ICD-10-CM

## 2019-01-08 DIAGNOSIS — K57.80 DIVERTICULITIS OF INTESTINE, PART UNSPECIFIED, WITH PERFORATION AND ABSCESS WITHOUT BLEEDING: ICD-10-CM

## 2019-01-14 LAB — FUNGUS SPEC QL CULT: SIGNIFICANT CHANGE UP

## 2019-01-16 ENCOUNTER — FORM ENCOUNTER (OUTPATIENT)
Age: 64
End: 2019-01-16

## 2019-01-17 ENCOUNTER — APPOINTMENT (OUTPATIENT)
Dept: CT IMAGING | Facility: HOSPITAL | Age: 64
End: 2019-01-17

## 2019-01-17 ENCOUNTER — OUTPATIENT (OUTPATIENT)
Dept: OUTPATIENT SERVICES | Facility: HOSPITAL | Age: 64
LOS: 1 days | End: 2019-01-17
Payer: COMMERCIAL

## 2019-01-17 DIAGNOSIS — D25.9 LEIOMYOMA OF UTERUS, UNSPECIFIED: Chronic | ICD-10-CM

## 2019-01-17 DIAGNOSIS — N73.9 FEMALE PELVIC INFLAMMATORY DISEASE, UNSPECIFIED: ICD-10-CM

## 2019-01-17 PROCEDURE — 76080 X-RAY EXAM OF FISTULA: CPT | Mod: 26

## 2019-01-17 PROCEDURE — 49424 ASSESS CYST CONTRAST INJECT: CPT

## 2019-01-22 DIAGNOSIS — K63.2 FISTULA OF INTESTINE: ICD-10-CM

## 2019-01-22 DIAGNOSIS — Z43.8 ENCOUNTER FOR ATTENTION TO OTHER ARTIFICIAL OPENINGS: ICD-10-CM

## 2019-01-29 ENCOUNTER — APPOINTMENT (OUTPATIENT)
Dept: COLORECTAL SURGERY | Facility: CLINIC | Age: 64
End: 2019-01-29
Payer: COMMERCIAL

## 2019-01-29 PROCEDURE — 99213 OFFICE O/P EST LOW 20 MIN: CPT

## 2019-01-29 NOTE — HISTORY OF PRESENT ILLNESS
[FreeTextEntry1] : 63-year-old female with perforated diverticulitis requiring percutaneous drainage. Patient is status post multiple drain studies showing persistent fistula. Currently denies pain. Tolerating diet. No fevers or chills. Presents for evaluation

## 2019-01-29 NOTE — ASSESSMENT
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 In size a large fibroid uterus, recommended hysterectomy at time of procedure. Risks and benefits of the cone portion of the procedure were discussed at length including bleeding, infection, risk of anastomotic dehiscence and possible stoma creation\par -Patient will schedule the procedure at her earliest convenience\par -She will be evaluated by gynecology oncology for possible hysterectomy at time of procedure\par -Bowel prep to be given

## 2019-01-30 DIAGNOSIS — K63.2 FISTULA OF INTESTINE: ICD-10-CM

## 2019-01-30 DIAGNOSIS — Z43.8 ENCOUNTER FOR ATTENTION TO OTHER ARTIFICIAL OPENINGS: ICD-10-CM

## 2019-02-11 ENCOUNTER — APPOINTMENT (OUTPATIENT)
Dept: GYNECOLOGIC ONCOLOGY | Facility: CLINIC | Age: 64
End: 2019-02-11
Payer: COMMERCIAL

## 2019-02-11 VITALS
DIASTOLIC BLOOD PRESSURE: 72 MMHG | WEIGHT: 199 LBS | HEART RATE: 74 BPM | BODY MASS INDEX: 39.07 KG/M2 | HEIGHT: 60 IN | SYSTOLIC BLOOD PRESSURE: 124 MMHG

## 2019-02-11 PROCEDURE — 99244 OFF/OP CNSLTJ NEW/EST MOD 40: CPT

## 2019-02-12 LAB — HPV HIGH+LOW RISK DNA PNL CVX: NOT DETECTED

## 2019-02-23 LAB — CYTOLOGY CVX/VAG DOC THIN PREP: NORMAL

## 2019-03-22 ENCOUNTER — OUTPATIENT (OUTPATIENT)
Dept: OUTPATIENT SERVICES | Facility: HOSPITAL | Age: 64
LOS: 1 days | End: 2019-03-22
Payer: COMMERCIAL

## 2019-03-22 VITALS
DIASTOLIC BLOOD PRESSURE: 70 MMHG | RESPIRATION RATE: 14 BRPM | HEIGHT: 60 IN | HEART RATE: 78 BPM | TEMPERATURE: 98 F | WEIGHT: 201.06 LBS | SYSTOLIC BLOOD PRESSURE: 128 MMHG | OXYGEN SATURATION: 97 %

## 2019-03-22 DIAGNOSIS — D25.9 LEIOMYOMA OF UTERUS, UNSPECIFIED: Chronic | ICD-10-CM

## 2019-03-22 DIAGNOSIS — K57.92 DIVERTICULITIS OF INTESTINE, PART UNSPECIFIED, WITHOUT PERFORATION OR ABSCESS WITHOUT BLEEDING: ICD-10-CM

## 2019-03-22 DIAGNOSIS — I10 ESSENTIAL (PRIMARY) HYPERTENSION: ICD-10-CM

## 2019-03-22 DIAGNOSIS — D25.9 LEIOMYOMA OF UTERUS, UNSPECIFIED: ICD-10-CM

## 2019-03-22 LAB
ANION GAP SERPL CALC-SCNC: 14 MMO/L — SIGNIFICANT CHANGE UP (ref 7–14)
BLD GP AB SCN SERPL QL: NEGATIVE — SIGNIFICANT CHANGE UP
BUN SERPL-MCNC: 17 MG/DL — SIGNIFICANT CHANGE UP (ref 7–23)
CALCIUM SERPL-MCNC: 10 MG/DL — SIGNIFICANT CHANGE UP (ref 8.4–10.5)
CHLORIDE SERPL-SCNC: 99 MMOL/L — SIGNIFICANT CHANGE UP (ref 98–107)
CO2 SERPL-SCNC: 26 MMOL/L — SIGNIFICANT CHANGE UP (ref 22–31)
CREAT SERPL-MCNC: 0.95 MG/DL — SIGNIFICANT CHANGE UP (ref 0.5–1.3)
GLUCOSE SERPL-MCNC: 93 MG/DL — SIGNIFICANT CHANGE UP (ref 70–99)
HBA1C BLD-MCNC: 6.1 % — HIGH (ref 4–5.6)
HCT VFR BLD CALC: 38 % — SIGNIFICANT CHANGE UP (ref 34.5–45)
HGB BLD-MCNC: 11.4 G/DL — LOW (ref 11.5–15.5)
MCHC RBC-ENTMCNC: 25.7 PG — LOW (ref 27–34)
MCHC RBC-ENTMCNC: 30 % — LOW (ref 32–36)
MCV RBC AUTO: 85.6 FL — SIGNIFICANT CHANGE UP (ref 80–100)
NRBC # FLD: 0 K/UL — LOW (ref 25–125)
PLATELET # BLD AUTO: 343 K/UL — SIGNIFICANT CHANGE UP (ref 150–400)
PMV BLD: 9.7 FL — SIGNIFICANT CHANGE UP (ref 7–13)
POTASSIUM SERPL-MCNC: 3.9 MMOL/L — SIGNIFICANT CHANGE UP (ref 3.5–5.3)
POTASSIUM SERPL-SCNC: 3.9 MMOL/L — SIGNIFICANT CHANGE UP (ref 3.5–5.3)
RBC # BLD: 4.44 M/UL — SIGNIFICANT CHANGE UP (ref 3.8–5.2)
RBC # FLD: 14.5 % — SIGNIFICANT CHANGE UP (ref 10.3–14.5)
RH IG SCN BLD-IMP: POSITIVE — SIGNIFICANT CHANGE UP
SODIUM SERPL-SCNC: 139 MMOL/L — SIGNIFICANT CHANGE UP (ref 135–145)
WBC # BLD: 7.21 K/UL — SIGNIFICANT CHANGE UP (ref 3.8–10.5)
WBC # FLD AUTO: 7.21 K/UL — SIGNIFICANT CHANGE UP (ref 3.8–10.5)

## 2019-03-22 PROCEDURE — 93010 ELECTROCARDIOGRAM REPORT: CPT

## 2019-03-22 RX ORDER — LOSARTAN POTASSIUM 100 MG/1
1 TABLET, FILM COATED ORAL
Qty: 0 | Refills: 0 | COMMUNITY

## 2019-03-22 NOTE — H&P PST ADULT - NSICDXPROBLEM_GEN_ALL_CORE_FT
PROBLEM DIAGNOSES  Problem: Diverticulitis  Assessment and Plan: Scheduled for total abdominal hysterectomy  with bilateral salpingo oophorectomy, laparoscopic low anterior resection, takedown enterocutaneous fistula on 04/05/2019.   Pre-Op instructions provided to patient.  Pepcid for GI prophylaxis provided.   Surgical scrub instructions reviewed and provided to patient.   Patient will obtain medical clearance as per surgeons request-copy requested.    Problem: Hypertension  Assessment and Plan: Pt. instructed to continue medications as prescribed.

## 2019-03-22 NOTE — H&P PST ADULT - NSICDXPASTMEDICALHX_GEN_ALL_CORE_FT
PAST MEDICAL HISTORY:  Diverticulitis of large intestine with perforation and abscess without bleeding    Hypertension PAST MEDICAL HISTORY:  Diverticulitis of large intestine with perforation and abscess without bleeding    Hypertension     Obesity PAST MEDICAL HISTORY:  Diverticulitis of large intestine with perforation and abscess without bleeding    Fibroid uterus     Hypertension     Obesity

## 2019-03-22 NOTE — H&P PST ADULT - NSANTHOSAYNRD_GEN_A_CORE
No. DOROTA screening performed.  STOP BANG Legend: 0-2 = LOW Risk; 3-4 = INTERMEDIATE Risk; 5-8 = HIGH Risk

## 2019-03-22 NOTE — H&P PST ADULT - HISTORY OF PRESENT ILLNESS
I have perforated diverticulitis , with an abcess and fistula 62 yo female presents to PST unit with pre-op diagnosis of leiomyoma, diverticulitis of large intestine with perforation and abscess without bleeding scheduled for total abdominal hysterectomy  with bilateral salpingo oophorectomy, laparoscopic low anterior resection, takedown enterocutaneous fistula on 04/05/2019. 64 yo female presents to PST unit with pre-op diagnosis of leiomyoma, diverticulitis of large intestine with perforation and abscess without bleeding scheduled for total abdominal hysterectomy  with bilateral salpingo oophorectomy, laparoscopic low anterior resection, takedown enterocutaneous fistula on 04/05/2019.

## 2019-03-22 NOTE — H&P PST ADULT - ASSESSMENT
64 yo female presents to PST unit with pre-op diagnosis of leiomyoma, diverticulitis of large intestine with perforation and abscess without bleeding scheduled for total abdominal hysterectomy  with bilateral salpingo oophorectomy, laparoscopic low anterior resection, takedown enterocutaneous fistula on 04/05/2019.

## 2019-03-22 NOTE — H&P PST ADULT - NEGATIVE NEUROLOGICAL SYMPTOMS
no transient paralysis/no focal seizures/no paresthesias/no generalized seizures/no weakness/no headache

## 2019-03-22 NOTE — H&P PST ADULT - RS GEN PE MLT RESP DETAILS PC
no wheezes/respirations non-labored/good air movement/breath sounds equal/airway patent/clear to auscultation bilaterally

## 2019-03-22 NOTE — H&P PST ADULT - NEGATIVE ENMT SYMPTOMS
no hearing difficulty/no post-nasal discharge/no vertigo/no sinus symptoms/no nasal congestion/no ear pain/no tinnitus

## 2019-03-22 NOTE — H&P PST ADULT - GASTROINTESTINAL COMMENTS
Pre-op diagnosis diverticulitis, ostomy a[ppliance + with stool-no blood Pre-op diagnosis perforated diverticulitis with lower abdominal ostomy appliance

## 2019-03-23 PROBLEM — K57.92 DIVERTICULITIS OF INTESTINE, PART UNSPECIFIED, WITHOUT PERFORATION OR ABSCESS WITHOUT BLEEDING: Chronic | Status: ACTIVE | Noted: 2018-10-31

## 2019-03-26 ENCOUNTER — APPOINTMENT (OUTPATIENT)
Dept: COLORECTAL SURGERY | Facility: CLINIC | Age: 64
End: 2019-03-26
Payer: COMMERCIAL

## 2019-03-26 PROCEDURE — 99213 OFFICE O/P EST LOW 20 MIN: CPT

## 2019-03-26 RX ORDER — AMOXICILLIN AND CLAVULANATE POTASSIUM 875; 125 MG/1; MG/1
875-125 TABLET, COATED ORAL
Refills: 0 | Status: DISCONTINUED | COMMUNITY
End: 2019-03-26

## 2019-03-26 NOTE — HISTORY OF PRESENT ILLNESS
[FreeTextEntry1] : 63-year-old female with perforated diverticulitis with percutaneous drain. Patient is scheduled for surgery next week. Reports some irritation at the drain site. Denies pain fevers or chills

## 2019-03-26 NOTE — ASSESSMENT
[FreeTextEntry1] : Perforated diverticulitis with enterocutaneous fistula\par -Diet as tolerated\par -Patient to proceed with surgery next week\par -All questions answered\par -Patient instructed on reinforcing drainage site

## 2019-04-03 ENCOUNTER — MESSAGE (OUTPATIENT)
Age: 64
End: 2019-04-03

## 2019-04-05 ENCOUNTER — APPOINTMENT (OUTPATIENT)
Dept: COLORECTAL SURGERY | Facility: HOSPITAL | Age: 64
End: 2019-04-05

## 2019-04-05 ENCOUNTER — APPOINTMENT (OUTPATIENT)
Dept: GYNECOLOGIC ONCOLOGY | Facility: HOSPITAL | Age: 64
End: 2019-04-05

## 2019-04-05 PROBLEM — E66.9 OBESITY, UNSPECIFIED: Chronic | Status: ACTIVE | Noted: 2019-03-22

## 2019-04-05 PROBLEM — D25.9 LEIOMYOMA OF UTERUS, UNSPECIFIED: Chronic | Status: ACTIVE | Noted: 2019-03-22

## 2019-04-09 ENCOUNTER — OUTPATIENT (OUTPATIENT)
Dept: OUTPATIENT SERVICES | Facility: HOSPITAL | Age: 64
LOS: 1 days | End: 2019-04-09
Payer: COMMERCIAL

## 2019-04-09 VITALS
RESPIRATION RATE: 14 BRPM | HEART RATE: 79 BPM | SYSTOLIC BLOOD PRESSURE: 130 MMHG | HEIGHT: 59 IN | TEMPERATURE: 98 F | OXYGEN SATURATION: 98 % | WEIGHT: 192.02 LBS | DIASTOLIC BLOOD PRESSURE: 80 MMHG

## 2019-04-09 DIAGNOSIS — K57.92 DIVERTICULITIS OF INTESTINE, PART UNSPECIFIED, WITHOUT PERFORATION OR ABSCESS WITHOUT BLEEDING: ICD-10-CM

## 2019-04-09 DIAGNOSIS — K57.20 DIVERTICULITIS OF LARGE INTESTINE WITH PERFORATION AND ABSCESS WITHOUT BLEEDING: ICD-10-CM

## 2019-04-09 DIAGNOSIS — D25.9 LEIOMYOMA OF UTERUS, UNSPECIFIED: Chronic | ICD-10-CM

## 2019-04-09 LAB
ALBUMIN SERPL ELPH-MCNC: 4 G/DL — SIGNIFICANT CHANGE UP (ref 3.3–5)
ALP SERPL-CCNC: 114 U/L — SIGNIFICANT CHANGE UP (ref 40–120)
ALT FLD-CCNC: 23 U/L — SIGNIFICANT CHANGE UP (ref 4–33)
ANION GAP SERPL CALC-SCNC: 15 MMO/L — HIGH (ref 7–14)
AST SERPL-CCNC: 18 U/L — SIGNIFICANT CHANGE UP (ref 4–32)
BILIRUB SERPL-MCNC: 0.3 MG/DL — SIGNIFICANT CHANGE UP (ref 0.2–1.2)
BLD GP AB SCN SERPL QL: NEGATIVE — SIGNIFICANT CHANGE UP
BUN SERPL-MCNC: 18 MG/DL — SIGNIFICANT CHANGE UP (ref 7–23)
CALCIUM SERPL-MCNC: 10.2 MG/DL — SIGNIFICANT CHANGE UP (ref 8.4–10.5)
CHLORIDE SERPL-SCNC: 93 MMOL/L — LOW (ref 98–107)
CO2 SERPL-SCNC: 28 MMOL/L — SIGNIFICANT CHANGE UP (ref 22–31)
CREAT SERPL-MCNC: 0.96 MG/DL — SIGNIFICANT CHANGE UP (ref 0.5–1.3)
GLUCOSE SERPL-MCNC: 92 MG/DL — SIGNIFICANT CHANGE UP (ref 70–99)
HBA1C BLD-MCNC: 6.6 % — HIGH (ref 4–5.6)
HCT VFR BLD CALC: 37.4 % — SIGNIFICANT CHANGE UP (ref 34.5–45)
HGB BLD-MCNC: 11.3 G/DL — LOW (ref 11.5–15.5)
MCHC RBC-ENTMCNC: 25.4 PG — LOW (ref 27–34)
MCHC RBC-ENTMCNC: 30.2 % — LOW (ref 32–36)
MCV RBC AUTO: 84 FL — SIGNIFICANT CHANGE UP (ref 80–100)
NRBC # FLD: 0 K/UL — SIGNIFICANT CHANGE UP (ref 0–0)
PLATELET # BLD AUTO: 300 K/UL — SIGNIFICANT CHANGE UP (ref 150–400)
PMV BLD: 10.2 FL — SIGNIFICANT CHANGE UP (ref 7–13)
POTASSIUM SERPL-MCNC: 4.3 MMOL/L — SIGNIFICANT CHANGE UP (ref 3.5–5.3)
POTASSIUM SERPL-SCNC: 4.3 MMOL/L — SIGNIFICANT CHANGE UP (ref 3.5–5.3)
PROT SERPL-MCNC: 8.5 G/DL — HIGH (ref 6–8.3)
RBC # BLD: 4.45 M/UL — SIGNIFICANT CHANGE UP (ref 3.8–5.2)
RBC # FLD: 14.5 % — SIGNIFICANT CHANGE UP (ref 10.3–14.5)
RH IG SCN BLD-IMP: POSITIVE — SIGNIFICANT CHANGE UP
SODIUM SERPL-SCNC: 136 MMOL/L — SIGNIFICANT CHANGE UP (ref 135–145)
WBC # BLD: 10.79 K/UL — HIGH (ref 3.8–10.5)
WBC # FLD AUTO: 10.79 K/UL — HIGH (ref 3.8–10.5)

## 2019-04-09 PROCEDURE — 93010 ELECTROCARDIOGRAM REPORT: CPT

## 2019-04-09 RX ORDER — LOSARTAN POTASSIUM 100 MG/1
1 TABLET, FILM COATED ORAL
Qty: 0 | Refills: 0 | COMMUNITY

## 2019-04-09 NOTE — H&P PST ADULT - CONSTITUTIONAL DETAILS
well-developed/well-nourished/well-groomed no distress/well-groomed/well-nourished/obese/well-developed

## 2019-04-09 NOTE — H&P PST ADULT - GASTROINTESTINAL DETAILS
soft/nontender no guarding/nontender/no rigidity/soft/bowel sounds normal/no organomegaly/obese/no masses palpable/no rebound tenderness/no bruit

## 2019-04-09 NOTE — H&P PST ADULT - MARITAL STATUS
no children, mother  62y/o pneumonia, shingles, father  throat cancer 86y/o 6 siblings, 1 brother  5y/o hydrocephalic, 1 brother  throat cancer, 1 brother  aids, 1 brother  unknown infection/Single

## 2019-04-09 NOTE — H&P PST ADULT - NEGATIVE ENMT SYMPTOMS
no dry mouth/no vertigo/no nasal congestion/no tinnitus/no sinus symptoms/no nose bleeds/no recurrent cold sores/no hearing difficulty/no nasal discharge/no ear pain/no nasal obstruction/no gum bleeding/no throat pain/no dysphagia

## 2019-04-09 NOTE — H&P PST ADULT - NEGATIVE BREAST SYMPTOMS
no breast lump L/no breast tenderness R/no nipple discharge L/no breast tenderness L/no breast lump R

## 2019-04-09 NOTE — H&P PST ADULT - MUSCULOSKELETAL
No joint pain, swelling or deformity; no limitation of movement negative details… detailed exam ROM intact/normal strength

## 2019-04-09 NOTE — H&P PST ADULT - RS GEN PE MLT RESP DETAILS PC
breath sounds equal/airway patent/no wheezes/good air movement/respirations non-labored/clear to auscultation bilaterally airway patent/no rales/breath sounds equal/clear to auscultation bilaterally/respirations non-labored/no rhonchi/no wheezes/no intercostal retractions/no chest wall tenderness/good air movement/no subcutaneous emphysema

## 2019-04-09 NOTE — H&P PST ADULT - NEGATIVE GENERAL GENITOURINARY SYMPTOMS
no hematuria/no dysuria/no flank pain L/normal urinary frequency/no flank pain R/no bladder infections

## 2019-04-09 NOTE — H&P PST ADULT - NEGATIVE GENERAL SYMPTOMS
no malaise/no sweating/no anorexia/no weight loss/no weight gain/no polyphagia/no polyuria/no chills/no polydipsia/no fatigue

## 2019-04-09 NOTE — H&P PST ADULT - NSICDXPROBLEM_GEN_ALL_CORE_FT
PROBLEM DIAGNOSES  Problem: Diverticulitis  Assessment and Plan: Pt scheduled for laparoscopic low anterior resection, takedown enterocutaneous fistula, tota abdominal hysterectomy, bilateral salpingo oophorectomy on 4/26/2019.  labs done results pending, ekg done.  Preop teaching done, pt able to verbalize understanding

## 2019-04-09 NOTE — H&P PST ADULT - NEGATIVE CARDIOVASCULAR SYMPTOMS
no dyspnea on exertion/no orthopnea/no peripheral edema/no claudication/no paroxysmal nocturnal dyspnea/no palpitations/no chest pain

## 2019-04-09 NOTE — H&P PST ADULT - HISTORY OF PRESENT ILLNESS
62y/o female scheduled for laparoscopic low anterior resection, takedown enterocutaneous fistula, total abdominal hysterectomy, bilateral salpingo oophorectomy on 4/6/2019.  Pt states, "was seen [facility] for perforated diverticulitis was d/c on on abx, developed a fever, f/u cat scan identified abcess.  IR placed an fistula to colostomy bag.  Now having reversed, cat scan also showed that uterus was adhering to area of abscess,  and leiomyoma was encouraged to have a ARELIS." Was previously scheduled for 3/2015 canceled due to fever of unknown origin, was tx with abx.

## 2019-04-09 NOTE — H&P PST ADULT - GASTROINTESTINAL COMMENTS
Pre-op diagnosis perforated diverticulitis with lower abdominal ostomy appliance Pre-op diagnosis diverticulitis, ostomy a[ppliance + with stool-no blood diverticulitis, ostomy appliance left lower ostomy bag with greenish fluid

## 2019-04-09 NOTE — H&P PST ADULT - NSICDXPASTMEDICALHX_GEN_ALL_CORE_FT
PAST MEDICAL HISTORY:  Diverticulitis of large intestine with perforation and abscess without bleeding    Fibroid uterus     Hypertension     Obesity

## 2019-04-09 NOTE — H&P PST ADULT - NEGATIVE NEUROLOGICAL SYMPTOMS
no transient paralysis/no paresthesias/no generalized seizures/no headache/no focal seizures/no weakness

## 2019-04-10 RX ORDER — INDAPAMIDE 1.25 MG
1 TABLET ORAL
Qty: 0 | Refills: 0 | COMMUNITY

## 2019-04-10 RX ORDER — FLUVASTATIN SODIUM 80 MG/1
1 TABLET, FILM COATED, EXTENDED RELEASE ORAL
Qty: 0 | Refills: 0 | COMMUNITY

## 2019-04-22 ENCOUNTER — INPATIENT (INPATIENT)
Facility: HOSPITAL | Age: 64
LOS: 13 days | Discharge: HOME HEALTH | End: 2019-05-06
Attending: STUDENT IN AN ORGANIZED HEALTH CARE EDUCATION/TRAINING PROGRAM | Admitting: STUDENT IN AN ORGANIZED HEALTH CARE EDUCATION/TRAINING PROGRAM
Payer: COMMERCIAL

## 2019-04-22 VITALS
TEMPERATURE: 98 F | SYSTOLIC BLOOD PRESSURE: 117 MMHG | HEART RATE: 95 BPM | RESPIRATION RATE: 19 BRPM | OXYGEN SATURATION: 100 % | DIASTOLIC BLOOD PRESSURE: 67 MMHG

## 2019-04-22 DIAGNOSIS — D25.9 LEIOMYOMA OF UTERUS, UNSPECIFIED: Chronic | ICD-10-CM

## 2019-04-22 DIAGNOSIS — K57.92 DIVERTICULITIS OF INTESTINE, PART UNSPECIFIED, WITHOUT PERFORATION OR ABSCESS WITHOUT BLEEDING: ICD-10-CM

## 2019-04-22 LAB
ALBUMIN SERPL ELPH-MCNC: 3.3 G/DL — SIGNIFICANT CHANGE UP (ref 3.3–5)
ALP SERPL-CCNC: 116 U/L — SIGNIFICANT CHANGE UP (ref 40–120)
ALT FLD-CCNC: 24 U/L — SIGNIFICANT CHANGE UP (ref 4–33)
ANION GAP SERPL CALC-SCNC: 14 MMO/L — SIGNIFICANT CHANGE UP (ref 7–14)
APPEARANCE UR: CLEAR — SIGNIFICANT CHANGE UP
AST SERPL-CCNC: 26 U/L — SIGNIFICANT CHANGE UP (ref 4–32)
BASE EXCESS BLDV CALC-SCNC: 3.6 MMOL/L — SIGNIFICANT CHANGE UP
BASOPHILS # BLD AUTO: 0.06 K/UL — SIGNIFICANT CHANGE UP (ref 0–0.2)
BASOPHILS NFR BLD AUTO: 0.4 % — SIGNIFICANT CHANGE UP (ref 0–2)
BILIRUB SERPL-MCNC: 0.2 MG/DL — SIGNIFICANT CHANGE UP (ref 0.2–1.2)
BILIRUB UR-MCNC: NEGATIVE — SIGNIFICANT CHANGE UP
BLOOD GAS VENOUS - CREATININE: 0.96 MG/DL — SIGNIFICANT CHANGE UP (ref 0.5–1.3)
BLOOD UR QL VISUAL: NEGATIVE — SIGNIFICANT CHANGE UP
BUN SERPL-MCNC: 23 MG/DL — SIGNIFICANT CHANGE UP (ref 7–23)
CALCIUM SERPL-MCNC: 9.8 MG/DL — SIGNIFICANT CHANGE UP (ref 8.4–10.5)
CHLORIDE BLDV-SCNC: 100 MMOL/L — SIGNIFICANT CHANGE UP (ref 96–108)
CHLORIDE SERPL-SCNC: 95 MMOL/L — LOW (ref 98–107)
CO2 SERPL-SCNC: 24 MMOL/L — SIGNIFICANT CHANGE UP (ref 22–31)
COLOR SPEC: SIGNIFICANT CHANGE UP
CREAT SERPL-MCNC: 1.05 MG/DL — SIGNIFICANT CHANGE UP (ref 0.5–1.3)
EOSINOPHIL # BLD AUTO: 0.34 K/UL — SIGNIFICANT CHANGE UP (ref 0–0.5)
EOSINOPHIL NFR BLD AUTO: 2.3 % — SIGNIFICANT CHANGE UP (ref 0–6)
GAS PNL BLDV: 136 MMOL/L — SIGNIFICANT CHANGE UP (ref 136–146)
GLUCOSE BLDV-MCNC: 130 — HIGH (ref 70–99)
GLUCOSE SERPL-MCNC: 132 MG/DL — HIGH (ref 70–99)
GLUCOSE UR-MCNC: NEGATIVE — SIGNIFICANT CHANGE UP
HCO3 BLDV-SCNC: 27 MMOL/L — SIGNIFICANT CHANGE UP (ref 20–27)
HCT VFR BLD CALC: 33.4 % — LOW (ref 34.5–45)
HCT VFR BLDV CALC: 31.4 % — LOW (ref 34.5–45)
HGB BLD-MCNC: 10.1 G/DL — LOW (ref 11.5–15.5)
HGB BLDV-MCNC: 10.1 G/DL — LOW (ref 11.5–15.5)
IMM GRANULOCYTES NFR BLD AUTO: 0.7 % — SIGNIFICANT CHANGE UP (ref 0–1.5)
KETONES UR-MCNC: NEGATIVE — SIGNIFICANT CHANGE UP
LACTATE BLDV-MCNC: 2 MMOL/L — SIGNIFICANT CHANGE UP (ref 0.5–2)
LEUKOCYTE ESTERASE UR-ACNC: NEGATIVE — SIGNIFICANT CHANGE UP
LYMPHOCYTES # BLD AUTO: 1.64 K/UL — SIGNIFICANT CHANGE UP (ref 1–3.3)
LYMPHOCYTES # BLD AUTO: 11.2 % — LOW (ref 13–44)
MCHC RBC-ENTMCNC: 25.3 PG — LOW (ref 27–34)
MCHC RBC-ENTMCNC: 30.2 % — LOW (ref 32–36)
MCV RBC AUTO: 83.7 FL — SIGNIFICANT CHANGE UP (ref 80–100)
MONOCYTES # BLD AUTO: 1.38 K/UL — HIGH (ref 0–0.9)
MONOCYTES NFR BLD AUTO: 9.4 % — SIGNIFICANT CHANGE UP (ref 2–14)
NEUTROPHILS # BLD AUTO: 11.11 K/UL — HIGH (ref 1.8–7.4)
NEUTROPHILS NFR BLD AUTO: 76 % — SIGNIFICANT CHANGE UP (ref 43–77)
NITRITE UR-MCNC: NEGATIVE — SIGNIFICANT CHANGE UP
NRBC # FLD: 0 K/UL — SIGNIFICANT CHANGE UP (ref 0–0)
PCO2 BLDV: 47 MMHG — SIGNIFICANT CHANGE UP (ref 41–51)
PH BLDV: 7.39 PH — SIGNIFICANT CHANGE UP (ref 7.32–7.43)
PH UR: 6 — SIGNIFICANT CHANGE UP (ref 5–8)
PLATELET # BLD AUTO: 468 K/UL — HIGH (ref 150–400)
PMV BLD: 8.9 FL — SIGNIFICANT CHANGE UP (ref 7–13)
PO2 BLDV: 30 MMHG — LOW (ref 35–40)
POTASSIUM BLDV-SCNC: 4.1 MMOL/L — SIGNIFICANT CHANGE UP (ref 3.4–4.5)
POTASSIUM SERPL-MCNC: 5.3 MMOL/L — SIGNIFICANT CHANGE UP (ref 3.5–5.3)
POTASSIUM SERPL-SCNC: 5.3 MMOL/L — SIGNIFICANT CHANGE UP (ref 3.5–5.3)
PROT SERPL-MCNC: 7.9 G/DL — SIGNIFICANT CHANGE UP (ref 6–8.3)
PROT UR-MCNC: NEGATIVE — SIGNIFICANT CHANGE UP
RBC # BLD: 3.99 M/UL — SIGNIFICANT CHANGE UP (ref 3.8–5.2)
RBC # FLD: 14.6 % — HIGH (ref 10.3–14.5)
SAO2 % BLDV: 50.6 % — LOW (ref 60–85)
SODIUM SERPL-SCNC: 133 MMOL/L — LOW (ref 135–145)
SP GR SPEC: 1.01 — SIGNIFICANT CHANGE UP (ref 1–1.04)
UROBILINOGEN FLD QL: NORMAL — SIGNIFICANT CHANGE UP
WBC # BLD: 14.63 K/UL — HIGH (ref 3.8–10.5)
WBC # FLD AUTO: 14.63 K/UL — HIGH (ref 3.8–10.5)

## 2019-04-22 PROCEDURE — 71046 X-RAY EXAM CHEST 2 VIEWS: CPT | Mod: 26

## 2019-04-22 PROCEDURE — 74177 CT ABD & PELVIS W/CONTRAST: CPT | Mod: 26

## 2019-04-22 RX ORDER — ATORVASTATIN CALCIUM 80 MG/1
20 TABLET, FILM COATED ORAL AT BEDTIME
Qty: 0 | Refills: 0 | Status: DISCONTINUED | OUTPATIENT
Start: 2019-04-22 | End: 2019-04-26

## 2019-04-22 RX ORDER — ACETAMINOPHEN 500 MG
325 TABLET ORAL EVERY 4 HOURS
Qty: 0 | Refills: 0 | Status: DISCONTINUED | OUTPATIENT
Start: 2019-04-22 | End: 2019-04-26

## 2019-04-22 RX ORDER — ACETAMINOPHEN 500 MG
650 TABLET ORAL ONCE
Qty: 0 | Refills: 0 | Status: COMPLETED | OUTPATIENT
Start: 2019-04-22 | End: 2019-04-22

## 2019-04-22 RX ORDER — SODIUM CHLORIDE 9 MG/ML
1000 INJECTION INTRAMUSCULAR; INTRAVENOUS; SUBCUTANEOUS ONCE
Qty: 0 | Refills: 0 | Status: COMPLETED | OUTPATIENT
Start: 2019-04-22 | End: 2019-04-22

## 2019-04-22 RX ORDER — LABETALOL HCL 100 MG
100 TABLET ORAL
Qty: 0 | Refills: 0 | Status: DISCONTINUED | OUTPATIENT
Start: 2019-04-22 | End: 2019-04-23

## 2019-04-22 RX ORDER — PIPERACILLIN AND TAZOBACTAM 4; .5 G/20ML; G/20ML
3.38 INJECTION, POWDER, LYOPHILIZED, FOR SOLUTION INTRAVENOUS ONCE
Qty: 0 | Refills: 0 | Status: COMPLETED | OUTPATIENT
Start: 2019-04-22 | End: 2019-04-22

## 2019-04-22 RX ORDER — HEPARIN SODIUM 5000 [USP'U]/ML
5000 INJECTION INTRAVENOUS; SUBCUTANEOUS EVERY 8 HOURS
Qty: 0 | Refills: 0 | Status: DISCONTINUED | OUTPATIENT
Start: 2019-04-22 | End: 2019-04-26

## 2019-04-22 RX ORDER — PIPERACILLIN AND TAZOBACTAM 4; .5 G/20ML; G/20ML
3.38 INJECTION, POWDER, LYOPHILIZED, FOR SOLUTION INTRAVENOUS EVERY 8 HOURS
Qty: 0 | Refills: 0 | Status: DISCONTINUED | OUTPATIENT
Start: 2019-04-22 | End: 2019-04-26

## 2019-04-22 RX ORDER — VANCOMYCIN HCL 1 G
1000 VIAL (EA) INTRAVENOUS ONCE
Qty: 0 | Refills: 0 | Status: COMPLETED | OUTPATIENT
Start: 2019-04-22 | End: 2019-04-22

## 2019-04-22 RX ORDER — LISINOPRIL 2.5 MG/1
10 TABLET ORAL DAILY
Qty: 0 | Refills: 0 | Status: DISCONTINUED | OUTPATIENT
Start: 2019-04-22 | End: 2019-04-23

## 2019-04-22 RX ORDER — DEXTROSE MONOHYDRATE, SODIUM CHLORIDE, AND POTASSIUM CHLORIDE 50; .745; 4.5 G/1000ML; G/1000ML; G/1000ML
1000 INJECTION, SOLUTION INTRAVENOUS
Qty: 0 | Refills: 0 | Status: DISCONTINUED | OUTPATIENT
Start: 2019-04-22 | End: 2019-04-23

## 2019-04-22 RX ADMIN — Medication 250 MILLIGRAM(S): at 19:10

## 2019-04-22 RX ADMIN — Medication 650 MILLIGRAM(S): at 18:28

## 2019-04-22 RX ADMIN — Medication 650 MILLIGRAM(S): at 20:06

## 2019-04-22 RX ADMIN — ATORVASTATIN CALCIUM 20 MILLIGRAM(S): 80 TABLET, FILM COATED ORAL at 23:16

## 2019-04-22 RX ADMIN — PIPERACILLIN AND TAZOBACTAM 200 GRAM(S): 4; .5 INJECTION, POWDER, LYOPHILIZED, FOR SOLUTION INTRAVENOUS at 18:30

## 2019-04-22 RX ADMIN — SODIUM CHLORIDE 1000 MILLILITER(S): 9 INJECTION INTRAMUSCULAR; INTRAVENOUS; SUBCUTANEOUS at 18:54

## 2019-04-22 NOTE — ED PROVIDER NOTE - GASTROINTESTINAL, MLM
Abdomen soft, non-tender, no guarding/rebound (+) surgical site in place LLQ, IR drain in place, purulent drainage noted on dressing, bag w/ purulent drainage.

## 2019-04-22 NOTE — ED PROVIDER NOTE - ATTENDING CONTRIBUTION TO CARE
agree with resident note  "63F PMHX of diverticulitis complicated w/ diverticular abscess s/p IR drainage 12/2018 pw fevers and increased drainage at surgical site x 1 day. PMD saw patient's surgical dressing soaked in yellow-drainage, was sent to ED for further evaluation. "  Pt notes she had fevers of unknown origin that delayed her initial surgery in early April and was started on 10 day course of clindamycin.    PE: febrile but not toxic appearing; normotensive; CTAB/L; s1 s2 no m/r/g abd: pus on gauze over IR drainage; no abdominal tenderness ext: no edema

## 2019-04-22 NOTE — ED PROVIDER NOTE - PMH
Diverticulitis  of large intestine with perforation and abscess without bleeding  Fibroid uterus    Hypertension    Obesity

## 2019-04-22 NOTE — H&P ADULT - NSHPLABSRESULTS_GEN_ALL_CORE
CBC (04-22 @ 18:02)                              10.1<L>                         14.63<H>  )----------------(  468<H>     76.0  % Neutrophils, 11.2<L>% Lymphocytes, ANC: 11.11<H>                              33.4<L>                BMP (04-22 @ 18:02)             133<L>  |  95<L>   |  23    		Ca++ --      Ca 9.8                ---------------------------------( 132<H>		Mg --                 5.3     |  24      |  1.05  			Ph --        LFTs (04-22 @ 18:02)      TPro 7.9 / Alb 3.3 / TBili 0.2 / DBili -- / AST 26 / ALT 24 / AlkPhos 116      ABG (04-22 @ 18:02)      /  /  /  /  / %     Lactate:   2.0    VBG (04-22 @ 18:02)     7.39 / 47 / 30<L> / 27 / 3.6 / 50.6<L>%      CT Abdomen and Pelvis w/ IV Cont (04.22.19 @ 20:44)   LOWER CHEST: Mild bibasilar dependent atelectasis.    LIVER: Few tiny subcentimeter hypodense lesions that are too small to   characterize.  BILE DUCTS: Normal caliber.  GALLBLADDER: Within normal limits.  SPLEEN: Within normal limits.  PANCREAS: Within normal limits.  ADRENALS: Within normal limits.  KIDNEYS/URETERS: Within normal limits.    BLADDER: Within normal limits.  REPRODUCTIVE ORGANS: Fibroid uterus.    BOWEL/PERITONEUM: A 8.6 x 2.4 cm anterior left pelvic complex collection   (series 2, images 84 and 81) with indwelling percutaneous drainage   catheter is mildly decreased since 12/13/2018, when it measured 8.0 x 2.9   cm, however, compared to recent tube check, there is increased fluid and   air within the cavity which is persistent. There is focal dilatation of   the small bowel superior to this collection with an intervening soft   tissue tract, best seen on series 601, image 87-88. Small hiatal hernia.       Colonic diverticulosis. Appendix normal.  VESSELS:  Atherosclerotic calcifications. Normal caliber abdominal aorta.  RETROPERITONEUM: No lymphadenopathy.    ABDOMINAL WALL: Anterior abdominal wall percutaneous drainage catheter.  BONES: Degenerative changes.    IMPRESSION:    Left-sided pelvic collection/abscess is slightly decreased in size   compared to initial CT of the abdomen pelvis from 12/13/2018, however,   increased fluid and air seen within the collection compared to most   recent tube check on 1/17/2019. 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

## 2019-04-22 NOTE — H&P ADULT - ASSESSMENT
Kell Washington is a 63 y.o. woman with history of HTN, HLD, uterine fibroid s/p myomectomy and perforated diverticulitis who was initially scheduled for hysterectomy, bilateral salpingo-oophorectomy, LAR and enterocutaneous fistula on 4/5, which was delayed for fever who presented to the ED on 4/22 for increased drainage from her drain. However, size of collection overall decreased from last check.     Plan:  - NPO  - May require IR drainage or drain check  - Zosyn  - Continue home medications  - Plan discussed with Dr. Italo Figueroa, PGY2  [id number]

## 2019-04-22 NOTE — ED ADULT NURSE NOTE - OBJECTIVE STATEMENT
Pt presents to ED after going to PCP for medical clearance for a colostomy reversed, hysterectomy, bowel resection on Friday the 26th. Pt noticed pus coming from drainage site on lower abdomen. Pt states PCP told her to come to ED for antibiotics and to have it examined. pt is AxOx3. pt denies chest pain, lightheadedness and dizziness. Pt denies shortness of breath. breathing even and unlabored. Pt denies abd pain, n/v/d. Abdomen soft and nontender. Drain with saturated guaze with pus on lower abdomen attached to drainage bag with purulent drainage, patient states drainage has always been this color. Pt denies dysuria and hematuria. Awaiting MD fung at this time. Will continue to monitor.

## 2019-04-22 NOTE — H&P ADULT - NSHPPHYSICALEXAM_GEN_ALL_CORE
General: AOx3  CV: normotensive, regular rate  Pulm: nonlabored  Abd: soft, nondistended, mild LUQ tenderness, LLQ drain with purulent drainage

## 2019-04-22 NOTE — ED PROVIDER NOTE - OBJECTIVE STATEMENT
63F PMHX of diverticulitis complicated w/ diverticular abscess s/p IR drainage 12/2018 pw fevers and increased drainage at surgical site x 1 day. PMD saw patient's surgical dressing soaked in yellow-drainage, was sent to ED for further evaluation. Denies any worsening abdominal pain, nausea/vomiting, headaches, neck pain, sob, chest pain, dysuria. IR drain has been putting out purulent drainage.

## 2019-04-22 NOTE — ED ADULT NURSE NOTE - ED STAT RN HANDOFF DETAILS
endorsed to keith houston. pt a&o x3, vss. nad noted. no c/o pain. green yellow purulent drainage noted at drainage site. dressing intact.

## 2019-04-22 NOTE — ED ADULT TRIAGE NOTE - CHIEF COMPLAINT QUOTE
Pt with colostomy bag with purulent drainage. Pt scheduled for hysterectomy and colostomy reversal on Friday but advised to come to ER for antibiotics.

## 2019-04-22 NOTE — ED PROVIDER NOTE - EKG ADDITIONAL INFORMATION FREE TEXT
sepsis 2/2 worsening diverticular abscess drainage? tachycardic/febrile, will re-ct ap with ivc, vanc/zosyn/tylenol/sepsis labs

## 2019-04-22 NOTE — H&P ADULT - HISTORY OF PRESENT ILLNESS
Kell Washington is a 63 y.o. woman with history of HTN, HLD, uterine fibroid s/p myomectomy and perforated diverticulitis who was initially scheduled for hysterectomy, bilateral salpingo-oophorectomy, LAR and enterocutaneous fistula on 4/5, which was delayed for fever who presented to the ED on 4/22 for increased drainage from her drain.       The patient's IR drain was initially placed on 12/14 and has remained in place since that time. The patient states that she usually empties the bag once per day and has to changed to dressing around the drain 2-3 times per day. Since placement, the patient states that drain has produced yellow/green, opaque, foul-smelling fluid.     At her PCP today, the patient states that there was more output than usual around the drain and her PCP sent her to the ED for evaluation of the abscess cavity.     The patient denies any fever, chills, appetite changes, constipation, diarrhea, or abdominal pain.

## 2019-04-23 LAB
ANION GAP SERPL CALC-SCNC: 15 MMO/L — HIGH (ref 7–14)
APTT BLD: 30 SEC — SIGNIFICANT CHANGE UP (ref 27.5–36.3)
BASOPHILS # BLD AUTO: 0.07 K/UL — SIGNIFICANT CHANGE UP (ref 0–0.2)
BASOPHILS NFR BLD AUTO: 0.9 % — SIGNIFICANT CHANGE UP (ref 0–2)
BLD GP AB SCN SERPL QL: NEGATIVE — SIGNIFICANT CHANGE UP
BUN SERPL-MCNC: 15 MG/DL — SIGNIFICANT CHANGE UP (ref 7–23)
CALCIUM SERPL-MCNC: 9.8 MG/DL — SIGNIFICANT CHANGE UP (ref 8.4–10.5)
CHLORIDE SERPL-SCNC: 97 MMOL/L — LOW (ref 98–107)
CO2 SERPL-SCNC: 26 MMOL/L — SIGNIFICANT CHANGE UP (ref 22–31)
CREAT SERPL-MCNC: 0.92 MG/DL — SIGNIFICANT CHANGE UP (ref 0.5–1.3)
EOSINOPHIL # BLD AUTO: 0.49 K/UL — SIGNIFICANT CHANGE UP (ref 0–0.5)
EOSINOPHIL NFR BLD AUTO: 6.4 % — HIGH (ref 0–6)
GLUCOSE SERPL-MCNC: 103 MG/DL — HIGH (ref 70–99)
HCT VFR BLD CALC: 33.9 % — LOW (ref 34.5–45)
HGB BLD-MCNC: 10 G/DL — LOW (ref 11.5–15.5)
IMM GRANULOCYTES NFR BLD AUTO: 0.7 % — SIGNIFICANT CHANGE UP (ref 0–1.5)
INR BLD: 1.15 — SIGNIFICANT CHANGE UP (ref 0.88–1.17)
LYMPHOCYTES # BLD AUTO: 1.57 K/UL — SIGNIFICANT CHANGE UP (ref 1–3.3)
LYMPHOCYTES # BLD AUTO: 20.4 % — SIGNIFICANT CHANGE UP (ref 13–44)
MAGNESIUM SERPL-MCNC: 1.8 MG/DL — SIGNIFICANT CHANGE UP (ref 1.6–2.6)
MCHC RBC-ENTMCNC: 25 PG — LOW (ref 27–34)
MCHC RBC-ENTMCNC: 29.5 % — LOW (ref 32–36)
MCV RBC AUTO: 84.8 FL — SIGNIFICANT CHANGE UP (ref 80–100)
MONOCYTES # BLD AUTO: 0.93 K/UL — HIGH (ref 0–0.9)
MONOCYTES NFR BLD AUTO: 12.1 % — SIGNIFICANT CHANGE UP (ref 2–14)
NEUTROPHILS # BLD AUTO: 4.57 K/UL — SIGNIFICANT CHANGE UP (ref 1.8–7.4)
NEUTROPHILS NFR BLD AUTO: 59.5 % — SIGNIFICANT CHANGE UP (ref 43–77)
NRBC # FLD: 0 K/UL — SIGNIFICANT CHANGE UP (ref 0–0)
PHOSPHATE SERPL-MCNC: 3.5 MG/DL — SIGNIFICANT CHANGE UP (ref 2.5–4.5)
PLATELET # BLD AUTO: 464 K/UL — HIGH (ref 150–400)
PMV BLD: 8.6 FL — SIGNIFICANT CHANGE UP (ref 7–13)
POTASSIUM SERPL-MCNC: 3.9 MMOL/L — SIGNIFICANT CHANGE UP (ref 3.5–5.3)
POTASSIUM SERPL-SCNC: 3.9 MMOL/L — SIGNIFICANT CHANGE UP (ref 3.5–5.3)
PROTHROM AB SERPL-ACNC: 12.8 SEC — SIGNIFICANT CHANGE UP (ref 9.8–13.1)
RBC # BLD: 4 M/UL — SIGNIFICANT CHANGE UP (ref 3.8–5.2)
RBC # FLD: 14.6 % — HIGH (ref 10.3–14.5)
RH IG SCN BLD-IMP: POSITIVE — SIGNIFICANT CHANGE UP
SODIUM SERPL-SCNC: 138 MMOL/L — SIGNIFICANT CHANGE UP (ref 135–145)
SPECIMEN SOURCE: SIGNIFICANT CHANGE UP
SPECIMEN SOURCE: SIGNIFICANT CHANGE UP
WBC # BLD: 7.68 K/UL — SIGNIFICANT CHANGE UP (ref 3.8–10.5)
WBC # FLD AUTO: 7.68 K/UL — SIGNIFICANT CHANGE UP (ref 3.8–10.5)

## 2019-04-23 PROCEDURE — 99223 1ST HOSP IP/OBS HIGH 75: CPT

## 2019-04-23 RX ORDER — LABETALOL HCL 100 MG
100 TABLET ORAL
Qty: 0 | Refills: 0 | Status: DISCONTINUED | OUTPATIENT
Start: 2019-04-23 | End: 2019-04-26

## 2019-04-23 RX ORDER — MAGNESIUM SULFATE 500 MG/ML
2 VIAL (ML) INJECTION ONCE
Qty: 0 | Refills: 0 | Status: COMPLETED | OUTPATIENT
Start: 2019-04-23 | End: 2019-04-23

## 2019-04-23 RX ORDER — LISINOPRIL 2.5 MG/1
10 TABLET ORAL DAILY
Qty: 0 | Refills: 0 | Status: DISCONTINUED | OUTPATIENT
Start: 2019-04-23 | End: 2019-04-26

## 2019-04-23 RX ADMIN — ATORVASTATIN CALCIUM 20 MILLIGRAM(S): 80 TABLET, FILM COATED ORAL at 21:42

## 2019-04-23 RX ADMIN — Medication 50 GRAM(S): at 16:22

## 2019-04-23 RX ADMIN — PIPERACILLIN AND TAZOBACTAM 25 GRAM(S): 4; .5 INJECTION, POWDER, LYOPHILIZED, FOR SOLUTION INTRAVENOUS at 12:44

## 2019-04-23 RX ADMIN — PIPERACILLIN AND TAZOBACTAM 25 GRAM(S): 4; .5 INJECTION, POWDER, LYOPHILIZED, FOR SOLUTION INTRAVENOUS at 01:21

## 2019-04-23 RX ADMIN — PIPERACILLIN AND TAZOBACTAM 25 GRAM(S): 4; .5 INJECTION, POWDER, LYOPHILIZED, FOR SOLUTION INTRAVENOUS at 21:42

## 2019-04-23 NOTE — PROVIDER CONTACT NOTE (OTHER) - ASSESSMENT
Pt resting comfortably in bed. No s/s of hypotension. BP medications held this morning.   Pt educated on use of heparin in preventing blood clots, pt refuses medication. Education will be enforced.

## 2019-04-23 NOTE — PROGRESS NOTE ADULT - SUBJECTIVE AND OBJECTIVE BOX
GENERAL SURGERY DAILY PROGRESS NOTE:     Subjective:  Pt seen and examined, no acute events overnight. Pain well controlled. Denies n/v. Will talk with IR regarding drain.     Objective:    MEDICATIONS  (STANDING):  atorvastatin 20 milliGRAM(s) Oral at bedtime  dextrose 5% + sodium chloride 0.45% with potassium chloride 20 mEq/L 1000 milliLiter(s) (100 mL/Hr) IV Continuous <Continuous>  heparin  Injectable 5000 Unit(s) SubCutaneous every 8 hours  labetalol 100 milliGRAM(s) Oral two times a day  lisinopril 10 milliGRAM(s) Oral daily  piperacillin/tazobactam IVPB. 3.375 Gram(s) IV Intermittent every 8 hours    MEDICATIONS  (PRN):  acetaminophen   Tablet .. 325 milliGRAM(s) Oral every 4 hours PRN Mild Pain (1 - 3)      Vital Signs Last 24 Hrs  T(C): 36.8 (2019 05:40), Max: 38.1 (2019 16:27)  T(F): 98.2 (2019 05:40), Max: 100.5 (2019 16:27)  HR: 72 (2019 05:40) (72 - 104)  BP: 97/52 (2019 05:40) (97/52 - 140/62)  BP(mean): --  RR: 20 (2019 05:40) (14 - 20)  SpO2: 99% (2019 05:40) (95% - 100%)    I&O's Detail    2019 07:01  -  2019 07:00  --------------------------------------------------------  IN:    dextrose 5% + sodium chloride 0.45% with potassium chloride 20 mEq/L: 600 mL    IV PiggyBack: 100 mL  Total IN: 700 mL    OUT:    Drain: 5 mL    Voided: 850 mL  Total OUT: 855 mL    Total NET: -155 mL          Daily Height in cm: 152.4 (2019 23:50)    Daily     LABS:                        10.1   14.63 )-----------( 468      ( 2019 18:02 )             33.4     04-    133<L>  |  95<L>  |  23  ----------------------------<  132<H>  5.3   |  24  |  1.05    Ca    9.8      2019 18:02    TPro  7.9  /  Alb  3.3  /  TBili  0.2  /  DBili  x   /  AST  26  /  ALT  24  /  AlkPhos  116        Urinalysis Basic - ( 2019 19:18 )    Color: LIGHT YELLOW / Appearance: CLEAR / S.013 / pH: 6.0  Gluc: NEGATIVE / Ketone: NEGATIVE  / Bili: NEGATIVE / Urobili: NORMAL   Blood: NEGATIVE / Protein: NEGATIVE / Nitrite: NEGATIVE   Leuk Esterase: NEGATIVE / RBC: x / WBC x   Sq Epi: x / Non Sq Epi: x / Bacteria: x        RADIOLOGY & ADDITIONAL STUDIES:    PHYSICAL EXAM:  General: AOx3  Pulm: nonlabored  Abd: soft, nondistended, mild LUQ tenderness, LLQ drain with purulent drainage

## 2019-04-23 NOTE — PROGRESS NOTE ADULT - ASSESSMENT
Kell Washington is a 63 y.o. woman with history of HTN, HLD, uterine fibroid s/p myomectomy and perforated diverticulitis who was initially scheduled for hysterectomy, bilateral salpingo-oophorectomy, LAR and enterocutaneous fistula on 4/5, which was delayed for fever who presented to the ED on 4/22 for increased drainage from her drain.     Plan:  - NPO  - IR consult   - Zosyn  - Continue home medications  - dvt ppx after discussing w/ IR    A Surgery  [id number]

## 2019-04-24 LAB
ANION GAP SERPL CALC-SCNC: 13 MMO/L — SIGNIFICANT CHANGE UP (ref 7–14)
BUN SERPL-MCNC: 16 MG/DL — SIGNIFICANT CHANGE UP (ref 7–23)
CALCIUM SERPL-MCNC: 10.1 MG/DL — SIGNIFICANT CHANGE UP (ref 8.4–10.5)
CHLORIDE SERPL-SCNC: 96 MMOL/L — LOW (ref 98–107)
CO2 SERPL-SCNC: 26 MMOL/L — SIGNIFICANT CHANGE UP (ref 22–31)
CREAT SERPL-MCNC: 0.91 MG/DL — SIGNIFICANT CHANGE UP (ref 0.5–1.3)
GLUCOSE SERPL-MCNC: 124 MG/DL — HIGH (ref 70–99)
HCT VFR BLD CALC: 31.9 % — LOW (ref 34.5–45)
HCV AB S/CO SERPL IA: 0.06 S/CO — SIGNIFICANT CHANGE UP (ref 0–0.99)
HCV AB SERPL-IMP: SIGNIFICANT CHANGE UP
HGB BLD-MCNC: 9.7 G/DL — LOW (ref 11.5–15.5)
MAGNESIUM SERPL-MCNC: 2.1 MG/DL — SIGNIFICANT CHANGE UP (ref 1.6–2.6)
MCHC RBC-ENTMCNC: 25.5 PG — LOW (ref 27–34)
MCHC RBC-ENTMCNC: 30.4 % — LOW (ref 32–36)
MCV RBC AUTO: 83.7 FL — SIGNIFICANT CHANGE UP (ref 80–100)
NRBC # FLD: 0 K/UL — SIGNIFICANT CHANGE UP (ref 0–0)
PHOSPHATE SERPL-MCNC: 3.9 MG/DL — SIGNIFICANT CHANGE UP (ref 2.5–4.5)
PLATELET # BLD AUTO: 453 K/UL — HIGH (ref 150–400)
PMV BLD: 8.7 FL — SIGNIFICANT CHANGE UP (ref 7–13)
POTASSIUM SERPL-MCNC: 3.8 MMOL/L — SIGNIFICANT CHANGE UP (ref 3.5–5.3)
POTASSIUM SERPL-SCNC: 3.8 MMOL/L — SIGNIFICANT CHANGE UP (ref 3.5–5.3)
RBC # BLD: 3.81 M/UL — SIGNIFICANT CHANGE UP (ref 3.8–5.2)
RBC # FLD: 14.6 % — HIGH (ref 10.3–14.5)
SODIUM SERPL-SCNC: 135 MMOL/L — SIGNIFICANT CHANGE UP (ref 135–145)
WBC # BLD: 9.8 K/UL — SIGNIFICANT CHANGE UP (ref 3.8–10.5)
WBC # FLD AUTO: 9.8 K/UL — SIGNIFICANT CHANGE UP (ref 3.8–10.5)

## 2019-04-24 PROCEDURE — 99232 SBSQ HOSP IP/OBS MODERATE 35: CPT

## 2019-04-24 RX ORDER — POTASSIUM CHLORIDE 20 MEQ
20 PACKET (EA) ORAL ONCE
Qty: 0 | Refills: 0 | Status: COMPLETED | OUTPATIENT
Start: 2019-04-24 | End: 2019-04-24

## 2019-04-24 RX ORDER — DOCUSATE SODIUM 100 MG
100 CAPSULE ORAL
Qty: 0 | Refills: 0 | Status: DISCONTINUED | OUTPATIENT
Start: 2019-04-24 | End: 2019-04-26

## 2019-04-24 RX ORDER — SENNA PLUS 8.6 MG/1
1 TABLET ORAL AT BEDTIME
Qty: 0 | Refills: 0 | Status: DISCONTINUED | OUTPATIENT
Start: 2019-04-24 | End: 2019-04-26

## 2019-04-24 RX ORDER — MULTIVIT WITH MIN/MFOLATE/K2 340-15/3 G
1 POWDER (GRAM) ORAL ONCE
Qty: 0 | Refills: 0 | Status: COMPLETED | OUTPATIENT
Start: 2019-04-24 | End: 2019-04-24

## 2019-04-24 RX ADMIN — PIPERACILLIN AND TAZOBACTAM 25 GRAM(S): 4; .5 INJECTION, POWDER, LYOPHILIZED, FOR SOLUTION INTRAVENOUS at 21:46

## 2019-04-24 RX ADMIN — Medication 100 MILLIGRAM(S): at 06:59

## 2019-04-24 RX ADMIN — Medication 20 MILLIEQUIVALENT(S): at 13:56

## 2019-04-24 RX ADMIN — LISINOPRIL 10 MILLIGRAM(S): 2.5 TABLET ORAL at 06:59

## 2019-04-24 RX ADMIN — ATORVASTATIN CALCIUM 20 MILLIGRAM(S): 80 TABLET, FILM COATED ORAL at 21:46

## 2019-04-24 RX ADMIN — PIPERACILLIN AND TAZOBACTAM 25 GRAM(S): 4; .5 INJECTION, POWDER, LYOPHILIZED, FOR SOLUTION INTRAVENOUS at 13:56

## 2019-04-24 RX ADMIN — Medication 100 MILLIGRAM(S): at 18:34

## 2019-04-24 RX ADMIN — Medication 1 BOTTLE: at 18:34

## 2019-04-24 RX ADMIN — PIPERACILLIN AND TAZOBACTAM 25 GRAM(S): 4; .5 INJECTION, POWDER, LYOPHILIZED, FOR SOLUTION INTRAVENOUS at 06:59

## 2019-04-24 NOTE — PROGRESS NOTE ADULT - ASSESSMENT
Kell Washington is a 63 y.o. woman with history of HTN, HLD, uterine fibroid s/p myomectomy and perforated diverticulitis who was initially scheduled for hysterectomy, bilateral salpingo-oophorectomy, LAR and enterocutaneous fistula on 4/5, which was delayed for fever who presented to the ED on 4/22 for increased drainage from her drain.     Plan:  - Plan for OR Friday 4/26/19  - Diet: LRD  - Abx: Zosyn  - Continue home medications  - Start senna/colace  - Dvt ppx: HepSQ    A Surgery  [id number]

## 2019-04-24 NOTE — PROGRESS NOTE ADULT - SUBJECTIVE AND OBJECTIVE BOX
GENERAL SURGERY DAILY PROGRESS NOTE:     Subjective:  Pt seen and examined, no acute events overnight. Pain well controlled. Denies n/v. Reports feeling constipated.      Objective:  Vital Signs Last 24 Hrs  T(C): 37.4 (24 Apr 2019 06:57), Max: 37.4 (24 Apr 2019 06:57)  T(F): 99.4 (24 Apr 2019 06:57), Max: 99.4 (24 Apr 2019 06:57)  HR: 89 (24 Apr 2019 06:57) (72 - 90)  BP: 120/71 (24 Apr 2019 06:57) (100/53 - 123/68)  BP(mean): --  RR: 20 (24 Apr 2019 06:57) (20 - 20)  SpO2: 96% (24 Apr 2019 06:57) (96% - 100%)    I&O's Detail    23 Apr 2019 07:01  -  24 Apr 2019 07:00  --------------------------------------------------------  IN:    IV PiggyBack: 100 mL    Oral Fluid: 240 mL  Total IN: 340 mL    OUT:    Drain: 45 mL    Voided: 2200 mL  Total OUT: 2245 mL    Total NET: -1905 mL          MEDICATIONS  (STANDING):  atorvastatin 20 milliGRAM(s) Oral at bedtime  heparin  Injectable 5000 Unit(s) SubCutaneous every 8 hours  labetalol 100 milliGRAM(s) Oral two times a day  lisinopril 10 milliGRAM(s) Oral daily  piperacillin/tazobactam IVPB. 3.375 Gram(s) IV Intermittent every 8 hours    MEDICATIONS  (PRN):  acetaminophen   Tablet .. 325 milliGRAM(s) Oral every 4 hours PRN Mild Pain (1 - 3)                            10.0   7.68  )-----------( 464      ( 23 Apr 2019 11:59 )             33.9       04-23    138  |  97<L>  |  15  ----------------------------<  103<H>  3.9   |  26  |  0.92    Ca    9.8      23 Apr 2019 11:59  Phos  3.5     04-23  Mg     1.8     04-23    TPro  7.9  /  Alb  3.3  /  TBili  0.2  /  DBili  x   /  AST  26  /  ALT  24  /  AlkPhos  116  04-22        PHYSICAL EXAM:  General: AOx3  Pulm: nonlabored  Abd: soft, nondistended, mild LUQ tenderness, LLQ drain with purulent drainage

## 2019-04-25 ENCOUNTER — TRANSCRIPTION ENCOUNTER (OUTPATIENT)
Age: 64
End: 2019-04-25

## 2019-04-25 LAB
ANION GAP SERPL CALC-SCNC: 13 MMO/L — SIGNIFICANT CHANGE UP (ref 7–14)
APTT BLD: 29.9 SEC — SIGNIFICANT CHANGE UP (ref 27.5–36.3)
BACTERIA UR CULT: SIGNIFICANT CHANGE UP
BLD GP AB SCN SERPL QL: NEGATIVE — SIGNIFICANT CHANGE UP
BUN SERPL-MCNC: 17 MG/DL — SIGNIFICANT CHANGE UP (ref 7–23)
CALCIUM SERPL-MCNC: 9.7 MG/DL — SIGNIFICANT CHANGE UP (ref 8.4–10.5)
CHLORIDE SERPL-SCNC: 97 MMOL/L — LOW (ref 98–107)
CO2 SERPL-SCNC: 28 MMOL/L — SIGNIFICANT CHANGE UP (ref 22–31)
CREAT SERPL-MCNC: 0.97 MG/DL — SIGNIFICANT CHANGE UP (ref 0.5–1.3)
GLUCOSE SERPL-MCNC: 104 MG/DL — HIGH (ref 70–99)
HCT VFR BLD CALC: 31.6 % — LOW (ref 34.5–45)
HGB BLD-MCNC: 9.5 G/DL — LOW (ref 11.5–15.5)
INR BLD: 1.14 — SIGNIFICANT CHANGE UP (ref 0.88–1.17)
MAGNESIUM SERPL-MCNC: 2.1 MG/DL — SIGNIFICANT CHANGE UP (ref 1.6–2.6)
MCHC RBC-ENTMCNC: 24.9 PG — LOW (ref 27–34)
MCHC RBC-ENTMCNC: 30.1 % — LOW (ref 32–36)
MCV RBC AUTO: 82.9 FL — SIGNIFICANT CHANGE UP (ref 80–100)
NRBC # FLD: 0 K/UL — SIGNIFICANT CHANGE UP (ref 0–0)
PHOSPHATE SERPL-MCNC: 3.5 MG/DL — SIGNIFICANT CHANGE UP (ref 2.5–4.5)
PLATELET # BLD AUTO: 399 K/UL — SIGNIFICANT CHANGE UP (ref 150–400)
PMV BLD: 9.3 FL — SIGNIFICANT CHANGE UP (ref 7–13)
POTASSIUM SERPL-MCNC: 4.7 MMOL/L — SIGNIFICANT CHANGE UP (ref 3.5–5.3)
POTASSIUM SERPL-SCNC: 4.7 MMOL/L — SIGNIFICANT CHANGE UP (ref 3.5–5.3)
PROTHROM AB SERPL-ACNC: 13 SEC — SIGNIFICANT CHANGE UP (ref 9.8–13.1)
RBC # BLD: 3.81 M/UL — SIGNIFICANT CHANGE UP (ref 3.8–5.2)
RBC # FLD: 14.8 % — HIGH (ref 10.3–14.5)
RH IG SCN BLD-IMP: POSITIVE — SIGNIFICANT CHANGE UP
SODIUM SERPL-SCNC: 138 MMOL/L — SIGNIFICANT CHANGE UP (ref 135–145)
SPECIMEN SOURCE: SIGNIFICANT CHANGE UP
WBC # BLD: 13.88 K/UL — HIGH (ref 3.8–10.5)
WBC # FLD AUTO: 13.88 K/UL — HIGH (ref 3.8–10.5)

## 2019-04-25 PROCEDURE — 99232 SBSQ HOSP IP/OBS MODERATE 35: CPT | Mod: 57

## 2019-04-25 RX ORDER — METRONIDAZOLE 500 MG
500 TABLET ORAL ONCE
Qty: 0 | Refills: 0 | Status: COMPLETED | OUTPATIENT
Start: 2019-04-25 | End: 2019-04-25

## 2019-04-25 RX ORDER — NEOMYCIN SULFATE 500 MG/1
1000 TABLET ORAL ONCE
Qty: 0 | Refills: 0 | Status: COMPLETED | OUTPATIENT
Start: 2019-04-25 | End: 2019-04-25

## 2019-04-25 RX ORDER — SOD SULF/SODIUM/NAHCO3/KCL/PEG
4000 SOLUTION, RECONSTITUTED, ORAL ORAL ONCE
Qty: 0 | Refills: 0 | Status: COMPLETED | OUTPATIENT
Start: 2019-04-25 | End: 2019-04-25

## 2019-04-25 RX ORDER — DEXTROSE MONOHYDRATE, SODIUM CHLORIDE, AND POTASSIUM CHLORIDE 50; .745; 4.5 G/1000ML; G/1000ML; G/1000ML
1000 INJECTION, SOLUTION INTRAVENOUS
Qty: 0 | Refills: 0 | Status: DISCONTINUED | OUTPATIENT
Start: 2019-04-25 | End: 2019-04-26

## 2019-04-25 RX ADMIN — NEOMYCIN SULFATE 1000 MILLIGRAM(S): 500 TABLET ORAL at 23:28

## 2019-04-25 RX ADMIN — Medication 100 MILLIGRAM(S): at 19:45

## 2019-04-25 RX ADMIN — Medication 100 MILLIGRAM(S): at 05:19

## 2019-04-25 RX ADMIN — ATORVASTATIN CALCIUM 20 MILLIGRAM(S): 80 TABLET, FILM COATED ORAL at 22:05

## 2019-04-25 RX ADMIN — PIPERACILLIN AND TAZOBACTAM 25 GRAM(S): 4; .5 INJECTION, POWDER, LYOPHILIZED, FOR SOLUTION INTRAVENOUS at 13:23

## 2019-04-25 RX ADMIN — Medication 500 MILLIGRAM(S): at 14:44

## 2019-04-25 RX ADMIN — NEOMYCIN SULFATE 1000 MILLIGRAM(S): 500 TABLET ORAL at 13:23

## 2019-04-25 RX ADMIN — NEOMYCIN SULFATE 1000 MILLIGRAM(S): 500 TABLET ORAL at 14:44

## 2019-04-25 RX ADMIN — PIPERACILLIN AND TAZOBACTAM 25 GRAM(S): 4; .5 INJECTION, POWDER, LYOPHILIZED, FOR SOLUTION INTRAVENOUS at 05:19

## 2019-04-25 RX ADMIN — Medication 500 MILLIGRAM(S): at 13:23

## 2019-04-25 RX ADMIN — Medication 500 MILLIGRAM(S): at 22:04

## 2019-04-25 RX ADMIN — Medication 4000 MILLILITER(S): at 14:44

## 2019-04-25 RX ADMIN — Medication 4000 MILLILITER(S): at 16:42

## 2019-04-25 RX ADMIN — PIPERACILLIN AND TAZOBACTAM 25 GRAM(S): 4; .5 INJECTION, POWDER, LYOPHILIZED, FOR SOLUTION INTRAVENOUS at 22:04

## 2019-04-25 RX ADMIN — SENNA PLUS 1 TABLET(S): 8.6 TABLET ORAL at 22:04

## 2019-04-25 NOTE — PROGRESS NOTE ADULT - ASSESSMENT
Kell Washington is a 63 y.o. woman with history of HTN, HLD, uterine fibroid s/p myomectomy and perforated diverticulitis who was initially scheduled for hysterectomy, bilateral salpingo-oophorectomy, LAR and enterocutaneous fistula on 4/5, which was delayed for fever who presented to the ED on 4/22 for increased drainage from her drain.     Plan:  - Plan for OR Friday 4/26/19 - preop/consent  - Diet: LRD/NPO at MN  - Abx: Zosyn  - Dvt ppx: HepSQ    A Surgery  [id number]

## 2019-04-25 NOTE — CHART NOTE - NSCHARTNOTEFT_GEN_A_CORE
A TEAM SURGERY PRE-OP NOTE    Diagnosis: Patient is a 63y old  Female who presents with a chief complaint of Perforated Diverticulitis    Procedure: possible Jose's Procedure, possible Low Anterior Resection    Surgeon: Italo             CBC (04-25 @ 05:35)                              9.5<L>                         13.88<H>  )----------------(  399        --    % Neutrophils, --    % Lymphocytes, ANC: --                                  31.6<L>              CBC (04-24 @ 07:00)                              9.7<L>                         9.80    )----------------(  453<H>     --    % Neutrophils, --    % Lymphocytes, ANC: --                                  31.9<L>                BMP (04-25 @ 05:35)             138     |  97<L>   |  17    		Ca++ --      Ca 9.7                ---------------------------------( 104<H>		Mg 2.1                4.7     |  28      |  0.97  			Ph 3.5     BMP (04-24 @ 07:00)             135     |  96<L>   |  16    		Ca++ --      Ca 10.1               ---------------------------------( 124<H>		Mg 2.1                3.8     |  26      |  0.91  			Ph 3.9         Coags (04-25 @ 05:35)  aPTT 29.9 / INR 1.14 / PT 13.0        -> BLOOD PERIPHERAL Culture (04-22 @ 21:35)     NG    NG  NG    Blood type: O Positive, antibody negative    Urinalysis 4/22/2019: Normal    Chest Xray: Clear, No pleural effusions or pneumothorax.  Cardiac and mediastinal silhouettes within normal limits.    62YO f : possible Jose's Procedure, possible Low Anterior Resection planned for tomorrow  - NPO past midnight  - IV fluids past midnight  - PO neomycin/ flagyl ordered  - golytly bowel prep starting this afternoon  - EKG  - Consent    JOSE FRANCISCO Cooper [id number]  A team Surgery [id number]

## 2019-04-25 NOTE — PROGRESS NOTE ADULT - SUBJECTIVE AND OBJECTIVE BOX
GENERAL SURGERY DAILY PROGRESS NOTE:     Subjective:  Pt seen and examined, no acute events overnight. Pain well controlled. Denies n/v.       Objective:  Vital Signs Last 24 Hrs  T(C): 37.2 (25 Apr 2019 02:00), Max: 37.4 (24 Apr 2019 06:57)  T(F): 98.9 (25 Apr 2019 02:00), Max: 99.4 (24 Apr 2019 06:57)  HR: 89 (25 Apr 2019 02:00) (75 - 89)  BP: 122/57 (25 Apr 2019 02:00) (108/54 - 122/57)  BP(mean): --  RR: 21 (25 Apr 2019 02:00) (16 - 21)  SpO2: 96% (25 Apr 2019 02:00) (96% - 99%)    I&O's Detail    23 Apr 2019 07:01  -  24 Apr 2019 07:00  --------------------------------------------------------  IN:    IV PiggyBack: 100 mL    Oral Fluid: 240 mL  Total IN: 340 mL    OUT:    Drain: 45 mL    Voided: 2200 mL  Total OUT: 2245 mL    Total NET: -1905 mL      24 Apr 2019 07:01  -  25 Apr 2019 02:37  --------------------------------------------------------  IN:    Oral Fluid: 720 mL  Total IN: 720 mL    OUT:    Drain: 22.5 mL    Voided: 1650 mL  Total OUT: 1672.5 mL    Total NET: -952.5 mL          MEDICATIONS  (STANDING):  atorvastatin 20 milliGRAM(s) Oral at bedtime  docusate sodium 100 milliGRAM(s) Oral two times a day  heparin  Injectable 5000 Unit(s) SubCutaneous every 8 hours  labetalol 100 milliGRAM(s) Oral two times a day  lisinopril 10 milliGRAM(s) Oral daily  piperacillin/tazobactam IVPB. 3.375 Gram(s) IV Intermittent every 8 hours  senna 1 Tablet(s) Oral at bedtime    MEDICATIONS  (PRN):  acetaminophen   Tablet .. 325 milliGRAM(s) Oral every 4 hours PRN Mild Pain (1 - 3)                            9.7    9.80  )-----------( 453      ( 24 Apr 2019 07:00 )             31.9       04-24    135  |  96<L>  |  16  ----------------------------<  124<H>  3.8   |  26  |  0.91    Ca    10.1      24 Apr 2019 07:00  Phos  3.9     04-24  Mg     2.1     04-24            PHYSICAL EXAM:  General: AOx3  Pulm: nonlabored  Abd: soft, nondistended, mild LUQ tenderness, LLQ drain with purulent drainage

## 2019-04-26 ENCOUNTER — APPOINTMENT (OUTPATIENT)
Dept: GYNECOLOGIC ONCOLOGY | Facility: HOSPITAL | Age: 64
End: 2019-04-26

## 2019-04-26 ENCOUNTER — RESULT REVIEW (OUTPATIENT)
Age: 64
End: 2019-04-26

## 2019-04-26 ENCOUNTER — APPOINTMENT (OUTPATIENT)
Dept: COLORECTAL SURGERY | Facility: HOSPITAL | Age: 64
End: 2019-04-26

## 2019-04-26 LAB
ANION GAP SERPL CALC-SCNC: 16 MMO/L — HIGH (ref 7–14)
ANION GAP SERPL CALC-SCNC: 16 MMO/L — HIGH (ref 7–14)
BLD GP AB SCN SERPL QL: NEGATIVE — SIGNIFICANT CHANGE UP
BUN SERPL-MCNC: 11 MG/DL — SIGNIFICANT CHANGE UP (ref 7–23)
BUN SERPL-MCNC: 8 MG/DL — SIGNIFICANT CHANGE UP (ref 7–23)
CA-I SERPL-SCNC: 1.16 MMOL/L — SIGNIFICANT CHANGE UP (ref 1.15–1.29)
CALCIUM SERPL-MCNC: 10.1 MG/DL — SIGNIFICANT CHANGE UP (ref 8.4–10.5)
CALCIUM SERPL-MCNC: 8.6 MG/DL — SIGNIFICANT CHANGE UP (ref 8.4–10.5)
CHLORIDE SERPL-SCNC: 96 MMOL/L — LOW (ref 98–107)
CHLORIDE SERPL-SCNC: 97 MMOL/L — LOW (ref 98–107)
CO2 SERPL-SCNC: 22 MMOL/L — SIGNIFICANT CHANGE UP (ref 22–31)
CO2 SERPL-SCNC: 25 MMOL/L — SIGNIFICANT CHANGE UP (ref 22–31)
CREAT SERPL-MCNC: 0.73 MG/DL — SIGNIFICANT CHANGE UP (ref 0.5–1.3)
CREAT SERPL-MCNC: 0.84 MG/DL — SIGNIFICANT CHANGE UP (ref 0.5–1.3)
GAS PNL BLDV: 132 MMOL/L — LOW (ref 136–146)
GLUCOSE BLDC GLUCOMTR-MCNC: 143 MG/DL — HIGH (ref 70–99)
GLUCOSE BLDV-MCNC: 177 — HIGH (ref 70–99)
GLUCOSE SERPL-MCNC: 144 MG/DL — HIGH (ref 70–99)
GLUCOSE SERPL-MCNC: 177 MG/DL — HIGH (ref 70–99)
HCT VFR BLD CALC: 32 % — LOW (ref 34.5–45)
HCT VFR BLD CALC: 33.4 % — LOW (ref 34.5–45)
HCT VFR BLDV CALC: 30 % — LOW (ref 34.5–45)
HGB BLD-MCNC: 10.1 G/DL — LOW (ref 11.5–15.5)
HGB BLD-MCNC: 9.5 G/DL — LOW (ref 11.5–15.5)
INR BLD: 1.19 — HIGH (ref 0.88–1.17)
MAGNESIUM SERPL-MCNC: 1.4 MG/DL — LOW (ref 1.6–2.6)
MAGNESIUM SERPL-MCNC: 2 MG/DL — SIGNIFICANT CHANGE UP (ref 1.6–2.6)
MCHC RBC-ENTMCNC: 25.3 PG — LOW (ref 27–34)
MCHC RBC-ENTMCNC: 25.5 PG — LOW (ref 27–34)
MCHC RBC-ENTMCNC: 29.7 % — LOW (ref 32–36)
MCHC RBC-ENTMCNC: 30.2 % — LOW (ref 32–36)
MCV RBC AUTO: 84.3 FL — SIGNIFICANT CHANGE UP (ref 80–100)
MCV RBC AUTO: 85.1 FL — SIGNIFICANT CHANGE UP (ref 80–100)
NRBC # FLD: 0 K/UL — SIGNIFICANT CHANGE UP (ref 0–0)
NRBC # FLD: 0 K/UL — SIGNIFICANT CHANGE UP (ref 0–0)
PHOSPHATE SERPL-MCNC: 3.5 MG/DL — SIGNIFICANT CHANGE UP (ref 2.5–4.5)
PHOSPHATE SERPL-MCNC: 3.8 MG/DL — SIGNIFICANT CHANGE UP (ref 2.5–4.5)
PLATELET # BLD AUTO: 457 K/UL — HIGH (ref 150–400)
PLATELET # BLD AUTO: 463 K/UL — HIGH (ref 150–400)
PMV BLD: 8.5 FL — SIGNIFICANT CHANGE UP (ref 7–13)
PMV BLD: 9.1 FL — SIGNIFICANT CHANGE UP (ref 7–13)
POTASSIUM BLDV-SCNC: 4.5 MMOL/L — SIGNIFICANT CHANGE UP (ref 3.4–4.5)
POTASSIUM SERPL-MCNC: 3.9 MMOL/L — SIGNIFICANT CHANGE UP (ref 3.5–5.3)
POTASSIUM SERPL-MCNC: 4.3 MMOL/L — SIGNIFICANT CHANGE UP (ref 3.5–5.3)
POTASSIUM SERPL-SCNC: 3.9 MMOL/L — SIGNIFICANT CHANGE UP (ref 3.5–5.3)
POTASSIUM SERPL-SCNC: 4.3 MMOL/L — SIGNIFICANT CHANGE UP (ref 3.5–5.3)
PROTHROM AB SERPL-ACNC: 13.3 SEC — HIGH (ref 9.8–13.1)
RBC # BLD: 3.76 M/UL — LOW (ref 3.8–5.2)
RBC # BLD: 3.96 M/UL — SIGNIFICANT CHANGE UP (ref 3.8–5.2)
RBC # FLD: 14.7 % — HIGH (ref 10.3–14.5)
RBC # FLD: 14.7 % — HIGH (ref 10.3–14.5)
RH IG SCN BLD-IMP: POSITIVE — SIGNIFICANT CHANGE UP
SODIUM SERPL-SCNC: 135 MMOL/L — SIGNIFICANT CHANGE UP (ref 135–145)
SODIUM SERPL-SCNC: 137 MMOL/L — SIGNIFICANT CHANGE UP (ref 135–145)
WBC # BLD: 12.68 K/UL — HIGH (ref 3.8–10.5)
WBC # BLD: 15.57 K/UL — HIGH (ref 3.8–10.5)
WBC # FLD AUTO: 12.68 K/UL — HIGH (ref 3.8–10.5)
WBC # FLD AUTO: 15.57 K/UL — HIGH (ref 3.8–10.5)

## 2019-04-26 PROCEDURE — 44640 REPAIR BOWEL-SKIN FISTULA: CPT

## 2019-04-26 PROCEDURE — 88304 TISSUE EXAM BY PATHOLOGIST: CPT | Mod: 26

## 2019-04-26 PROCEDURE — 10120 INC&RMVL FB SUBQ TISS SMPL: CPT

## 2019-04-26 PROCEDURE — 45330 DIAGNOSTIC SIGMOIDOSCOPY: CPT | Mod: 59

## 2019-04-26 PROCEDURE — 44145 PARTIAL REMOVAL OF COLON: CPT

## 2019-04-26 PROCEDURE — 45020 DRAINAGE OF RECTAL ABSCESS: CPT

## 2019-04-26 PROCEDURE — 88307 TISSUE EXAM BY PATHOLOGIST: CPT | Mod: 26

## 2019-04-26 PROCEDURE — 44139 MOBILIZATION OF COLON: CPT

## 2019-04-26 PROCEDURE — 58150 TOTAL HYSTERECTOMY: CPT | Mod: GC,22

## 2019-04-26 PROCEDURE — 50715 RELEASE OF URETER: CPT | Mod: 59

## 2019-04-26 RX ORDER — NALBUPHINE HYDROCHLORIDE 10 MG/ML
2.5 INJECTION, SOLUTION INTRAMUSCULAR; INTRAVENOUS; SUBCUTANEOUS EVERY 6 HOURS
Qty: 0 | Refills: 0 | Status: DISCONTINUED | OUTPATIENT
Start: 2019-04-26 | End: 2019-04-29

## 2019-04-26 RX ORDER — ACETAMINOPHEN 500 MG
1000 TABLET ORAL ONCE
Qty: 0 | Refills: 0 | Status: COMPLETED | OUTPATIENT
Start: 2019-04-27 | End: 2019-04-27

## 2019-04-26 RX ORDER — ACETAMINOPHEN 500 MG
1000 TABLET ORAL ONCE
Qty: 0 | Refills: 0 | Status: COMPLETED | OUTPATIENT
Start: 2019-04-26 | End: 2019-04-26

## 2019-04-26 RX ORDER — ONDANSETRON 8 MG/1
4 TABLET, FILM COATED ORAL EVERY 6 HOURS
Qty: 0 | Refills: 0 | Status: DISCONTINUED | OUTPATIENT
Start: 2019-04-26 | End: 2019-04-29

## 2019-04-26 RX ORDER — HYDROMORPHONE HYDROCHLORIDE 2 MG/ML
0.5 INJECTION INTRAMUSCULAR; INTRAVENOUS; SUBCUTANEOUS
Qty: 0 | Refills: 0 | Status: DISCONTINUED | OUTPATIENT
Start: 2019-04-26 | End: 2019-04-29

## 2019-04-26 RX ORDER — SODIUM CHLORIDE 9 MG/ML
1000 INJECTION, SOLUTION INTRAVENOUS
Qty: 0 | Refills: 0 | Status: DISCONTINUED | OUTPATIENT
Start: 2019-04-26 | End: 2019-04-27

## 2019-04-26 RX ORDER — PIPERACILLIN AND TAZOBACTAM 4; .5 G/20ML; G/20ML
3.38 INJECTION, POWDER, LYOPHILIZED, FOR SOLUTION INTRAVENOUS EVERY 8 HOURS
Qty: 0 | Refills: 0 | Status: COMPLETED | OUTPATIENT
Start: 2019-04-26 | End: 2019-05-01

## 2019-04-26 RX ORDER — ONDANSETRON 8 MG/1
4 TABLET, FILM COATED ORAL ONCE
Qty: 0 | Refills: 0 | Status: COMPLETED | OUTPATIENT
Start: 2019-04-26 | End: 2019-04-26

## 2019-04-26 RX ORDER — HEPARIN SODIUM 5000 [USP'U]/ML
5000 INJECTION INTRAVENOUS; SUBCUTANEOUS EVERY 8 HOURS
Qty: 0 | Refills: 0 | Status: DISCONTINUED | OUTPATIENT
Start: 2019-04-26 | End: 2019-05-06

## 2019-04-26 RX ORDER — LABETALOL HCL 100 MG
100 TABLET ORAL
Qty: 0 | Refills: 0 | Status: DISCONTINUED | OUTPATIENT
Start: 2019-04-26 | End: 2019-05-01

## 2019-04-26 RX ORDER — MAGNESIUM SULFATE 500 MG/ML
2 VIAL (ML) INJECTION ONCE
Qty: 0 | Refills: 0 | Status: COMPLETED | OUTPATIENT
Start: 2019-04-26 | End: 2019-04-26

## 2019-04-26 RX ORDER — NALOXONE HYDROCHLORIDE 4 MG/.1ML
0.1 SPRAY NASAL
Qty: 0 | Refills: 0 | Status: DISCONTINUED | OUTPATIENT
Start: 2019-04-26 | End: 2019-04-29

## 2019-04-26 RX ORDER — HYDROMORPHONE HYDROCHLORIDE 2 MG/ML
0.5 INJECTION INTRAMUSCULAR; INTRAVENOUS; SUBCUTANEOUS
Qty: 0 | Refills: 0 | Status: DISCONTINUED | OUTPATIENT
Start: 2019-04-26 | End: 2019-04-26

## 2019-04-26 RX ORDER — DIPHENHYDRAMINE HCL 50 MG
12.5 CAPSULE ORAL EVERY 4 HOURS
Qty: 0 | Refills: 0 | Status: DISCONTINUED | OUTPATIENT
Start: 2019-04-26 | End: 2019-04-29

## 2019-04-26 RX ORDER — LISINOPRIL 2.5 MG/1
10 TABLET ORAL DAILY
Qty: 0 | Refills: 0 | Status: DISCONTINUED | OUTPATIENT
Start: 2019-04-26 | End: 2019-05-01

## 2019-04-26 RX ORDER — ATORVASTATIN CALCIUM 80 MG/1
20 TABLET, FILM COATED ORAL AT BEDTIME
Qty: 0 | Refills: 0 | Status: DISCONTINUED | OUTPATIENT
Start: 2019-04-26 | End: 2019-05-01

## 2019-04-26 RX ADMIN — PIPERACILLIN AND TAZOBACTAM 25 GRAM(S): 4; .5 INJECTION, POWDER, LYOPHILIZED, FOR SOLUTION INTRAVENOUS at 14:55

## 2019-04-26 RX ADMIN — Medication 50 GRAM(S): at 17:44

## 2019-04-26 RX ADMIN — SODIUM CHLORIDE 125 MILLILITER(S): 9 INJECTION, SOLUTION INTRAVENOUS at 14:32

## 2019-04-26 RX ADMIN — HEPARIN SODIUM 5000 UNIT(S): 5000 INJECTION INTRAVENOUS; SUBCUTANEOUS at 21:13

## 2019-04-26 RX ADMIN — Medication 400 MILLIGRAM(S): at 21:12

## 2019-04-26 RX ADMIN — PIPERACILLIN AND TAZOBACTAM 25 GRAM(S): 4; .5 INJECTION, POWDER, LYOPHILIZED, FOR SOLUTION INTRAVENOUS at 21:12

## 2019-04-26 RX ADMIN — ONDANSETRON 4 MILLIGRAM(S): 8 TABLET, FILM COATED ORAL at 16:11

## 2019-04-26 RX ADMIN — Medication 1000 MILLIGRAM(S): at 21:35

## 2019-04-26 RX ADMIN — ATORVASTATIN CALCIUM 20 MILLIGRAM(S): 80 TABLET, FILM COATED ORAL at 21:13

## 2019-04-26 RX ADMIN — Medication 100 MILLIGRAM(S): at 19:06

## 2019-04-26 RX ADMIN — PIPERACILLIN AND TAZOBACTAM 25 GRAM(S): 4; .5 INJECTION, POWDER, LYOPHILIZED, FOR SOLUTION INTRAVENOUS at 05:42

## 2019-04-26 NOTE — CHART NOTE - NSCHARTNOTEFT_GEN_A_CORE
POST OPERATIVE NOTE  A Team surgery    STATUS POST:  Low anterior resection with drainage of abscess cavity & excision of chronic IR drain tract. GYN present for ARELIS/BSO due to involvement of the uterus/left adnexa in the abscess cavity      SUBJECTIVE: The Pt tolerated the procedure well. Since leaving the OR she reports she feels fine. Her pain is well controlled on PCEA. She denies any CP, SOB, N/V.    Vital Signs Last 24 Hrs  T(C): 36.4 (26 Apr 2019 17:15), Max: 37.2 (25 Apr 2019 22:24)  T(F): 97.5 (26 Apr 2019 17:15), Max: 99 (25 Apr 2019 22:24)  HR: 83 (26 Apr 2019 17:15) (76 - 101)  BP: 128/64 (26 Apr 2019 17:15) (97/53 - 140/86)  BP(mean): 80 (26 Apr 2019 17:15) (63 - 87)  ABP: --  ABP(mean): --  RR: 22 (26 Apr 2019 17:15) (14 - 25)  SpO2: 100% (26 Apr 2019 17:15) (94% - 100%)      I&O's Detail      25 Apr 2019 07:01  -  26 Apr 2019 07:00  --------------------------------------------------------  IN:    dextrose 5% + sodium chloride 0.45% with potassium chloride 20 mEq/L: 900 mL    IV PiggyBack: 300 mL    Oral Fluid: 1680 mL  Total IN: 2880 mL    OUT:    Drain: 27.5 mL    Voided: 300 mL  Total OUT: 327.5 mL    Total NET: 2552.5 mL      26 Apr 2019 07:01  -  26 Apr 2019 17:52  --------------------------------------------------------  IN:    lactated ringers.: 125 mL  Total IN: 125 mL    OUT:    Indwelling Catheter - Urethral: 275 mL  Total OUT: 275 mL    Total NET: -150 mL    MEDICATIONS  (STANDING):  acetaminophen  IVPB .. 1000 milliGRAM(s) IV Intermittent once  atorvastatin 20 milliGRAM(s) Oral at bedtime  heparin  Injectable 5000 Unit(s) SubCutaneous every 8 hours  HYDROmorphone (10 MICROgram(s)/mL) + BUpivacaine 0.0625% in 0.9% Sodium Chloride PCEA 250 milliLiter(s) Epidural PCA Continuous  labetalol 100 milliGRAM(s) Oral two times a day  lactated ringers. 1000 milliLiter(s) (125 mL/Hr) IV Continuous <Continuous>  lisinopril 10 milliGRAM(s) Oral daily  piperacillin/tazobactam IVPB. 3.375 Gram(s) IV Intermittent every 8 hours    MEDICATIONS  (PRN):  diphenhydrAMINE   Injectable 12.5 milliGRAM(s) IV Push every 4 hours PRN Pruritus  HYDROmorphone  Injectable 0.5 milliGRAM(s) IV Push every 3 hours PRN for breakthrough pain while on epidural infusion  HYDROmorphone  Injectable 0.5 milliGRAM(s) IV Push every 10 minutes PRN Moderate Pain (4 - 6)  HYDROmorphone (10 MICROgram(s)/mL) + BUpivacaine 0.0625% in 0.9% Sodium Chloride PCEA Rescue Clinician  Bolus 5 milliLiter(s) Epidural every 15 minutes PRN for Pain Score greater than 6  nalbuphine Injectable 2.5 milliGRAM(s) IV Push every 6 hours PRN Pruritus  naloxone Injectable 0.1 milliGRAM(s) IV Push every 3 minutes PRN For ANY of the following changes in patient status:  A. RR LESS THAN 10 breaths per minute, B. Oxygen saturation LESS THAN 90%, C. Sedation score of 6  ondansetron Injectable 4 milliGRAM(s) IV Push every 6 hours PRN Nausea    CBC (04-26 @ 14:28)                              9.5<L>                         15.57<H>  )----------------(  463<H>     --    % Neutrophils, --    % Lymphocytes, ANC: --                                  32.0<L>              CBC (04-26 @ 06:40)                              10.1<L>                         12.68<H>  )----------------(  457<H>     --    % Neutrophils, --    % Lymphocytes, ANC: --                                  33.4<L>                BMP (04-26 @ 14:28)             135     |  97<L>   |  8     		Ca++ --      Ca 8.6                ---------------------------------( 177<H>		Mg 1.4<L>             4.3     |  22      |  0.73  			Ph 3.5     BMP (04-26 @ 06:40)             137     |  96<L>   |  11    		Ca++ --      Ca 10.1               ---------------------------------( 144<H>		Mg 2.0                3.9     |  25      |  0.84  			Ph 3.8         Coags (04-26 @ 06:40)  aPTT -- / INR 1.19<H> / PT 13.3<H>        -> BLOOD PERIPHERAL Culture (04-22 @ 21:35)     NG    NG  NG    -> URINE MIDSTREAM Culture (04-22 @ 21:19)     NG    NG  NG      PHYSICAL EXAM:  GEN: NAD, AOx3  ABD: Soft, ND, appropriately tender, midline surgical site dressing with minimal spotting of serous-anguinous fluid.    EXTREMITIES: warm throughout, neg edema    A/P: 63y Female s/p Low anterior resection with drainage of abscess cavity & excision of chronic IR drain tract. GYN present for ARELIS/BSO due to involvement of the uterus/left adnexa in the abscess cavity  - Pain control with PCEA  - DVT ppx SQH  -replace magnesium  - f/u a.m. labs  - OOB  - NPO      JOSE FRANCISCO Cooper [id number]  A team surgery [id number]

## 2019-04-26 NOTE — BRIEF OPERATIVE NOTE - NSICDXBRIEFPROCEDURE_GEN_ALL_CORE_FT
PROCEDURES:  Resection, colon, low anterior, open 26-Apr-2019 14:57:59  Sunny Davila  Low anterior resection of rectum and total excision of mesorectum 26-Apr-2019 14:47:46  Sunny Davila
PROCEDURES:  Total abdominal hysterectomy with salpingo-oophorectomy 26-Apr-2019 11:42:04  Tanesha Tracy

## 2019-04-26 NOTE — PROGRESS NOTE ADULT - ASSESSMENT
62 yo female s/p ARELIS, BSO ( for fibroid uterus), s/p low anterior resection with drainage of abcess cavity & excision of chronic IR drain tract by surgical team.   CV: hemodynamically stable, labetalol, lisinopril (h/o htn)  pulm: saturating well on NC 2L, wean as tolerated, encourage incentive spirometer, continuous pulse ox while pcea is in use  ID: afebrile, WBC 15.5, f/u cbc in am, continue Zosyn q8hrs x 5days for perforated diverticilitis  heme: hsq q8hrs, bilat. venodynes during rest for DVT prophylaxis  : drew in place, /2 hrs  F/E/N: LR@125, Mg 1.4( repleted with MgSO4 2grams), f/u BMP in am-replete lytes prn, clear diet  neuro: pain management-pcea, IV Tylenol, IV Dilaudid  dispo: admit to 8S for post operative management

## 2019-04-26 NOTE — BRIEF OPERATIVE NOTE - NSICDXBRIEFPREOP_GEN_ALL_CORE_FT
PRE-OP DIAGNOSIS:  Diverticulitis, colon 26-Apr-2019 14:58:50  Sunny Davila
PRE-OP DIAGNOSIS:  Fibroid uterus 26-Apr-2019 11:42:25  Tanesha Tracy

## 2019-04-26 NOTE — BRIEF OPERATIVE NOTE - NSICDXBRIEFPOSTOP_GEN_ALL_CORE_FT
POST-OP DIAGNOSIS:  Diverticulitis 26-Apr-2019 14:59:54  Sunny Davila
POST-OP DIAGNOSIS:  Fibroid uterus 26-Apr-2019 11:42:40  Tanesha Tracy

## 2019-04-26 NOTE — PROGRESS NOTE ADULT - SUBJECTIVE AND OBJECTIVE BOX
GYNECOLOGIC ONCOLOGY POST OP NOTE    Pt seen and examined at bedside. Patient c/o only of nausea, denies vomiting. Pain is well controlled with pcea. Pt denies headache, chest pain, sob.  OBJECTIVE:     VITALS:  T(F): 97.5 (04-26-19 @ 13:45), Max: 99 (04-25-19 @ 22:24)  HR: 85 (04-26-19 @ 17:00) (76 - 101)  BP: 119/69 (04-26-19 @ 17:00) (97/53 - 140/86)  RR: 21 (04-26-19 @ 17:00) (14 - 25)  SpO2: 100% (04-26-19 @ 17:00) (94% - 100%)  Wt(kg): --    I&O's Summary    25 Apr 2019 07:01  -  26 Apr 2019 07:00  --------------------------------------------------------  IN: 2880 mL / OUT: 327.5 mL / NET: 2552.5 mL    26 Apr 2019 07:01  -  26 Apr 2019 17:12  --------------------------------------------------------  IN: 125 mL / OUT: 200 mL / NET: -75 mL        MEDICATIONS  (STANDING):  acetaminophen  IVPB .. 1000 milliGRAM(s) IV Intermittent once  atorvastatin 20 milliGRAM(s) Oral at bedtime  heparin  Injectable 5000 Unit(s) SubCutaneous every 8 hours  HYDROmorphone (10 MICROgram(s)/mL) + BUpivacaine 0.0625% in 0.9% Sodium Chloride PCEA 250 milliLiter(s) Epidural PCA Continuous  labetalol 100 milliGRAM(s) Oral two times a day  lactated ringers. 1000 milliLiter(s) (125 mL/Hr) IV Continuous <Continuous>  lisinopril 10 milliGRAM(s) Oral daily  magnesium sulfate  IVPB 2 Gram(s) IV Intermittent once  piperacillin/tazobactam IVPB. 3.375 Gram(s) IV Intermittent every 8 hours    MEDICATIONS  (PRN):  diphenhydrAMINE   Injectable 12.5 milliGRAM(s) IV Push every 4 hours PRN Pruritus  HYDROmorphone  Injectable 0.5 milliGRAM(s) IV Push every 3 hours PRN for breakthrough pain while on epidural infusion  HYDROmorphone  Injectable 0.5 milliGRAM(s) IV Push every 10 minutes PRN Moderate Pain (4 - 6)  HYDROmorphone (10 MICROgram(s)/mL) + BUpivacaine 0.0625% in 0.9% Sodium Chloride PCEA Rescue Clinician  Bolus 5 milliLiter(s) Epidural every 15 minutes PRN for Pain Score greater than 6  nalbuphine Injectable 2.5 milliGRAM(s) IV Push every 6 hours PRN Pruritus  naloxone Injectable 0.1 milliGRAM(s) IV Push every 3 minutes PRN For ANY of the following changes in patient status:  A. RR LESS THAN 10 breaths per minute, B. Oxygen saturation LESS THAN 90%, C. Sedation score of 6  ondansetron Injectable 4 milliGRAM(s) IV Push every 6 hours PRN Nausea      Physical Exam:  Constitutional: NAD  Pulmonary: clear to auscultation bilaterally   Cardiovascular: Regular rate and rhythm   Abdomen: soft, non-tender, non-distended, normal bowel signs, vertical incision with dressing (bloodstains marked)  Extremities: no lower extremity edema or calve tenderness, bilat venodynes in place      LABS:                        9.5    15.57 )-----------( 463      ( 26 Apr 2019 14:28 )             32.0     04-26    135  |  97<L>  |  8   ----------------------------<  177<H>  4.3   |  22  |  0.73    Ca    8.6      26 Apr 2019 14:28  Phos  3.5     04-26  Mg     1.4     04-26      PT/INR - ( 26 Apr 2019 06:40 )   PT: 13.3 SEC;   INR: 1.19          PTT - ( 25 Apr 2019 05:35 )  PTT:29.9 SEC

## 2019-04-26 NOTE — BRIEF OPERATIVE NOTE - OPERATION/FINDINGS
14 week size uterus with multiple intramural and subserosal myomas. Grossly normal right fallopian tube and ovary. Left adnexa adherent to pelvic side wall and colonic abscess.

## 2019-04-26 NOTE — BRIEF OPERATIVE NOTE - OPERATION/FINDINGS
Low anterior resection with drainage of abscess cavity & excision of chronic IR drain tract. GYN present for ARELIS/BSO (To be dictated separately) due to involvement of the uterus/left adnexa in the abscess cavity

## 2019-04-27 DIAGNOSIS — Z98.890 OTHER SPECIFIED POSTPROCEDURAL STATES: ICD-10-CM

## 2019-04-27 LAB
ANION GAP SERPL CALC-SCNC: 12 MMO/L — SIGNIFICANT CHANGE UP (ref 7–14)
APTT BLD: 27.7 SEC — SIGNIFICANT CHANGE UP (ref 27.5–36.3)
BACTERIA BLD CULT: SIGNIFICANT CHANGE UP
BACTERIA BLD CULT: SIGNIFICANT CHANGE UP
BUN SERPL-MCNC: 8 MG/DL — SIGNIFICANT CHANGE UP (ref 7–23)
CALCIUM SERPL-MCNC: 8.8 MG/DL — SIGNIFICANT CHANGE UP (ref 8.4–10.5)
CHLORIDE SERPL-SCNC: 95 MMOL/L — LOW (ref 98–107)
CO2 SERPL-SCNC: 27 MMOL/L — SIGNIFICANT CHANGE UP (ref 22–31)
CREAT SERPL-MCNC: 0.88 MG/DL — SIGNIFICANT CHANGE UP (ref 0.5–1.3)
GLUCOSE SERPL-MCNC: 125 MG/DL — HIGH (ref 70–99)
HCT VFR BLD CALC: 28.6 % — LOW (ref 34.5–45)
HGB BLD-MCNC: 8.7 G/DL — LOW (ref 11.5–15.5)
INR BLD: 1.25 — HIGH (ref 0.88–1.17)
MAGNESIUM SERPL-MCNC: 2.1 MG/DL — SIGNIFICANT CHANGE UP (ref 1.6–2.6)
MCHC RBC-ENTMCNC: 25.4 PG — LOW (ref 27–34)
MCHC RBC-ENTMCNC: 30.4 % — LOW (ref 32–36)
MCV RBC AUTO: 83.6 FL — SIGNIFICANT CHANGE UP (ref 80–100)
NRBC # FLD: 0 K/UL — SIGNIFICANT CHANGE UP (ref 0–0)
PHOSPHATE SERPL-MCNC: 4.7 MG/DL — HIGH (ref 2.5–4.5)
PLATELET # BLD AUTO: 366 K/UL — SIGNIFICANT CHANGE UP (ref 150–400)
PMV BLD: 8.8 FL — SIGNIFICANT CHANGE UP (ref 7–13)
POTASSIUM SERPL-MCNC: 4.4 MMOL/L — SIGNIFICANT CHANGE UP (ref 3.5–5.3)
POTASSIUM SERPL-SCNC: 4.4 MMOL/L — SIGNIFICANT CHANGE UP (ref 3.5–5.3)
PROTHROM AB SERPL-ACNC: 14.3 SEC — HIGH (ref 9.8–13.1)
RBC # BLD: 3.42 M/UL — LOW (ref 3.8–5.2)
RBC # FLD: 14.7 % — HIGH (ref 10.3–14.5)
SODIUM SERPL-SCNC: 134 MMOL/L — LOW (ref 135–145)
WBC # BLD: 12.05 K/UL — HIGH (ref 3.8–10.5)
WBC # FLD AUTO: 12.05 K/UL — HIGH (ref 3.8–10.5)

## 2019-04-27 RX ORDER — BENZOCAINE AND MENTHOL 5; 1 G/100ML; G/100ML
1 LIQUID ORAL ONCE
Qty: 0 | Refills: 0 | Status: DISCONTINUED | OUTPATIENT
Start: 2019-04-27 | End: 2019-04-27

## 2019-04-27 RX ORDER — BENZOCAINE AND MENTHOL 5; 1 G/100ML; G/100ML
1 LIQUID ORAL
Qty: 0 | Refills: 0 | Status: DISCONTINUED | OUTPATIENT
Start: 2019-04-27 | End: 2019-05-06

## 2019-04-27 RX ORDER — SODIUM CHLORIDE 9 MG/ML
1000 INJECTION, SOLUTION INTRAVENOUS
Qty: 0 | Refills: 0 | Status: DISCONTINUED | OUTPATIENT
Start: 2019-04-27 | End: 2019-04-29

## 2019-04-27 RX ADMIN — HEPARIN SODIUM 5000 UNIT(S): 5000 INJECTION INTRAVENOUS; SUBCUTANEOUS at 22:35

## 2019-04-27 RX ADMIN — PIPERACILLIN AND TAZOBACTAM 25 GRAM(S): 4; .5 INJECTION, POWDER, LYOPHILIZED, FOR SOLUTION INTRAVENOUS at 06:50

## 2019-04-27 RX ADMIN — HEPARIN SODIUM 5000 UNIT(S): 5000 INJECTION INTRAVENOUS; SUBCUTANEOUS at 13:49

## 2019-04-27 RX ADMIN — SODIUM CHLORIDE 75 MILLILITER(S): 9 INJECTION, SOLUTION INTRAVENOUS at 10:35

## 2019-04-27 RX ADMIN — HEPARIN SODIUM 5000 UNIT(S): 5000 INJECTION INTRAVENOUS; SUBCUTANEOUS at 06:50

## 2019-04-27 RX ADMIN — Medication 400 MILLIGRAM(S): at 12:09

## 2019-04-27 RX ADMIN — PIPERACILLIN AND TAZOBACTAM 25 GRAM(S): 4; .5 INJECTION, POWDER, LYOPHILIZED, FOR SOLUTION INTRAVENOUS at 22:35

## 2019-04-27 RX ADMIN — ONDANSETRON 4 MILLIGRAM(S): 8 TABLET, FILM COATED ORAL at 10:27

## 2019-04-27 RX ADMIN — PIPERACILLIN AND TAZOBACTAM 25 GRAM(S): 4; .5 INJECTION, POWDER, LYOPHILIZED, FOR SOLUTION INTRAVENOUS at 13:49

## 2019-04-27 RX ADMIN — BENZOCAINE AND MENTHOL 1 LOZENGE: 5; 1 LIQUID ORAL at 11:39

## 2019-04-27 RX ADMIN — Medication 1000 MILLIGRAM(S): at 12:39

## 2019-04-27 RX ADMIN — ATORVASTATIN CALCIUM 20 MILLIGRAM(S): 80 TABLET, FILM COATED ORAL at 22:35

## 2019-04-27 RX ADMIN — Medication 400 MILLIGRAM(S): at 06:50

## 2019-04-27 RX ADMIN — Medication 100 MILLIGRAM(S): at 19:07

## 2019-04-27 NOTE — PROGRESS NOTE ADULT - PROBLEM SELECTOR PLAN 1
- f/u AM Labs   - plan per Gen Surg   - continue routine postop care    SHAYE Rios MD PGY2 - f/u AM Labs   - cepacol for scratchy throat   - plan per Gen Surg, appreciate excellent care   - continue routine postop care    SHAYE Rios MD PGY2

## 2019-04-27 NOTE — PROGRESS NOTE ADULT - ASSESSMENT
Kell Washington is a 63 y.o. woman with history of HTN, HLD, uterine fibroid s/p myomectomy and perforated diverticulitis who was initially scheduled for hysterectomy, bilateral salpingo-oophorectomy, LAR and enterocutaneous fistula on 4/5, which was delayed for fever who presented to the ED on 4/22 for increased drainage from her drain. s/p open LAR, excision of IR drain tract, and ARELIS BSO 4/26.     Plan:  - pain control - PCEA  - f/u am labs, monitor coags  - Daniel  - CLD  - f/u GI   Abx: Zosyn, ends 5/1  - Dvt ppx: HepSQ    A Surgery  [id number]

## 2019-04-27 NOTE — PROGRESS NOTE ADULT - SUBJECTIVE AND OBJECTIVE BOX
GENERAL SURGERY DAILY PROGRESS NOTE:     Subjective:  Pt seen and examined. No acute events overnight. s/p open LAR, excision of IR drain tract, and ARELIS BSO w/ gyn. Pt w/ PCEA. Pain well controlled. Denies n/v. Tolerating clears.     Objective:    MEDICATIONS  (STANDING):  acetaminophen  IVPB .. 1000 milliGRAM(s) IV Intermittent once  acetaminophen  IVPB .. 1000 milliGRAM(s) IV Intermittent once  atorvastatin 20 milliGRAM(s) Oral at bedtime  heparin  Injectable 5000 Unit(s) SubCutaneous every 8 hours  HYDROmorphone (10 MICROgram(s)/mL) + BUpivacaine 0.0625% in 0.9% Sodium Chloride PCEA 250 milliLiter(s) Epidural PCA Continuous  labetalol 100 milliGRAM(s) Oral two times a day  lactated ringers. 1000 milliLiter(s) (125 mL/Hr) IV Continuous <Continuous>  lisinopril 10 milliGRAM(s) Oral daily  piperacillin/tazobactam IVPB. 3.375 Gram(s) IV Intermittent every 8 hours    MEDICATIONS  (PRN):  diphenhydrAMINE   Injectable 12.5 milliGRAM(s) IV Push every 4 hours PRN Pruritus  HYDROmorphone  Injectable 0.5 milliGRAM(s) IV Push every 3 hours PRN for breakthrough pain while on epidural infusion  HYDROmorphone (10 MICROgram(s)/mL) + BUpivacaine 0.0625% in 0.9% Sodium Chloride PCEA Rescue Clinician  Bolus 5 milliLiter(s) Epidural every 15 minutes PRN for Pain Score greater than 6  nalbuphine Injectable 2.5 milliGRAM(s) IV Push every 6 hours PRN Pruritus  naloxone Injectable 0.1 milliGRAM(s) IV Push every 3 minutes PRN For ANY of the following changes in patient status:  A. RR LESS THAN 10 breaths per minute, B. Oxygen saturation LESS THAN 90%, C. Sedation score of 6  ondansetron Injectable 4 milliGRAM(s) IV Push every 6 hours PRN Nausea      Vital Signs Last 24 Hrs  T(C): 36.6 (26 Apr 2019 22:26), Max: 37.1 (26 Apr 2019 02:00)  T(F): 97.8 (26 Apr 2019 22:26), Max: 98.7 (26 Apr 2019 02:00)  HR: 76 (26 Apr 2019 22:26) (76 - 101)  BP: 102/57 (26 Apr 2019 22:26) (97/53 - 137/70)  BP(mean): 80 (26 Apr 2019 17:15) (63 - 87)  RR: 18 (26 Apr 2019 22:26) (14 - 25)  SpO2: 98% (26 Apr 2019 22:26) (94% - 100%)    I&O's Detail    25 Apr 2019 07:01  -  26 Apr 2019 07:00  --------------------------------------------------------  IN:    dextrose 5% + sodium chloride 0.45% with potassium chloride 20 mEq/L: 900 mL    IV PiggyBack: 300 mL    Oral Fluid: 1680 mL  Total IN: 2880 mL    OUT:    Drain: 27.5 mL    Voided: 300 mL  Total OUT: 327.5 mL    Total NET: 2552.5 mL      26 Apr 2019 07:01  -  27 Apr 2019 00:27  --------------------------------------------------------  IN:    lactated ringers.: 125 mL  Total IN: 125 mL    OUT:    Indwelling Catheter - Urethral: 375 mL  Total OUT: 375 mL    Total NET: -250 mL          Daily Height in cm: 152.4 (26 Apr 2019 07:11)    Daily     LABS:                        9.5    15.57 )-----------( 463      ( 26 Apr 2019 14:28 )             32.0     04-26    135  |  97<L>  |  8   ----------------------------<  177<H>  4.3   |  22  |  0.73    Ca    8.6      26 Apr 2019 14:28  Phos  3.5     04-26  Mg     1.4     04-26      PT/INR - ( 26 Apr 2019 06:40 )   PT: 13.3 SEC;   INR: 1.19          PTT - ( 25 Apr 2019 05:35 )  PTT:29.9 SEC      RADIOLOGY & ADDITIONAL STUDIES:    PHYSICAL EXAM  NAD, awake and alert  Respirations nonlabored  Abdomen soft, appropriately tender, nondistended, dressing w/ some sanguinous staining  No guarding or rebound tenderness

## 2019-04-27 NOTE — PROGRESS NOTE ADULT - SUBJECTIVE AND OBJECTIVE BOX
ANESTHESIA POSTOP CHECK    63y Female POSTOP DAY 1    [ X] NO APPARENT ANESTHESIA COMPLICATIONS      Comments:

## 2019-04-27 NOTE — PROGRESS NOTE ADULT - SUBJECTIVE AND OBJECTIVE BOX
POD#   HD#    Patient seen and examined at bedside, no acute overnight events. No acute complaints, pain well controlled.  Patient is ambulating, passing flatus, voiding spontaneously, and tolerating regular diet. Denies CP, SOB, N/V, fevers, and chills.    Vital Signs Last 24 Hours  T(C): 36.4 (04-27-19 @ 02:09), Max: 36.7 (04-26-19 @ 06:50)  HR: 78 (04-27-19 @ 02:09) (76 - 89)  BP: 101/56 (04-27-19 @ 02:09) (97/53 - 137/70)  RR: 20 (04-27-19 @ 02:09) (14 - 25)  SpO2: 97% (04-27-19 @ 02:09) (96% - 100%)    I&O's Summary    25 Apr 2019 07:01  -  26 Apr 2019 07:00  --------------------------------------------------------  IN: 2880 mL / OUT: 327.5 mL / NET: 2552.5 mL    26 Apr 2019 07:01  -  27 Apr 2019 05:52  --------------------------------------------------------  IN: 625 mL / OUT: 525 mL / NET: 100 mL        Physical Exam:  General: NAD  CV: NR, RR, S1, S2, no M/R/G  Lungs: CTA-B  Abdomen: Soft, non-tender, non-distended, +BS  Incision: _ CDI  Ext: No pain or swelling    Labs:                        9.5    15.57 )-----------( 463      ( 26 Apr 2019 14:28 )             32.0   baso x      eos x      imm gran x      lymph x      mono x      poly x                            10.1   12.68 )-----------( 457      ( 26 Apr 2019 06:40 )             33.4   baso x      eos x      imm gran x      lymph x      mono x      poly x                            9.5    13.88 )-----------( 399      ( 25 Apr 2019 05:35 )             31.6   baso x      eos x      imm gran x      lymph x      mono x      poly x                            9.7    9.80  )-----------( 453      ( 24 Apr 2019 07:00 )             31.9   baso x      eos x      imm gran x      lymph x      mono x      poly x        04-26    135  |  97<L>  |  8   ----------------------------<  177<H>  4.3   |  22  |  0.73    Ca    8.6      26 Apr 2019 14:28  Phos  3.5     04-26  Mg     1.4     04-26      PT/INR - ( 26 Apr 2019 06:40 )   PT: 13.3 SEC;   INR: 1.19                Blood Type: O Positive      MEDICATIONS  (STANDING):  acetaminophen  IVPB .. 1000 milliGRAM(s) IV Intermittent once  acetaminophen  IVPB .. 1000 milliGRAM(s) IV Intermittent once  atorvastatin 20 milliGRAM(s) Oral at bedtime  heparin  Injectable 5000 Unit(s) SubCutaneous every 8 hours  HYDROmorphone (10 MICROgram(s)/mL) + BUpivacaine 0.0625% in 0.9% Sodium Chloride PCEA 250 milliLiter(s) Epidural PCA Continuous  labetalol 100 milliGRAM(s) Oral two times a day  lactated ringers. 1000 milliLiter(s) (125 mL/Hr) IV Continuous <Continuous>  lisinopril 10 milliGRAM(s) Oral daily  piperacillin/tazobactam IVPB. 3.375 Gram(s) IV Intermittent every 8 hours    MEDICATIONS  (PRN):  diphenhydrAMINE   Injectable 12.5 milliGRAM(s) IV Push every 4 hours PRN Pruritus  HYDROmorphone  Injectable 0.5 milliGRAM(s) IV Push every 3 hours PRN for breakthrough pain while on epidural infusion  HYDROmorphone (10 MICROgram(s)/mL) + BUpivacaine 0.0625% in 0.9% Sodium Chloride PCEA Rescue Clinician  Bolus 5 milliLiter(s) Epidural every 15 minutes PRN for Pain Score greater than 6  nalbuphine Injectable 2.5 milliGRAM(s) IV Push every 6 hours PRN Pruritus  naloxone Injectable 0.1 milliGRAM(s) IV Push every 3 minutes PRN For ANY of the following changes in patient status:  A. RR LESS THAN 10 breaths per minute, B. Oxygen saturation LESS THAN 90%, C. Sedation score of 6  ondansetron Injectable 4 milliGRAM(s) IV Push every 6 hours PRN Nausea R2 GYN ONC PROGRESS NOTE    POD#1   HD#2    SUBJECTIVE:  Patient seen and examined at bedside, no acute overnight events. No acute complaints, pain well controlled.  Patient is ambulating, passing flatus, voiding spontaneously, and tolerating regular diet. Denies CP, SOB, N/V, fevers, and chills.    OBJECTIVE:  Vital Signs Last 24 Hours  T(C): 36.4 (04-27-19 @ 02:09), Max: 36.7 (04-26-19 @ 06:50)  HR: 78 (04-27-19 @ 02:09) (76 - 89)  BP: 101/56 (04-27-19 @ 02:09) (97/53 - 137/70)  RR: 20 (04-27-19 @ 02:09) (14 - 25)  SpO2: 97% (04-27-19 @ 02:09) (96% - 100%)    I&O's Summary    25 Apr 2019 07:01  -  26 Apr 2019 07:00  --------------------------------------------------------  IN: 2880 mL / OUT: 327.5 mL / NET: 2552.5 mL    26 Apr 2019 07:01  -  27 Apr 2019 05:52  --------------------------------------------------------  IN: 625 mL / OUT: 525 mL / NET: 100 mL    Physical Exam:  General: NAD  CV: RRR  Lungs: CTA-B  Abdomen: Soft, appropriately tender, non-distended  Incision: CDI  Ext: No pain or swelling    Labs:                        9.5    15.57 )-----------( 463      ( 26 Apr 2019 14:28 )             32.0                            10.1   12.68 )-----------( 457      ( 26 Apr 2019 06:40 )             33.4                             9.5    13.88 )-----------( 399      ( 25 Apr 2019 05:35 )             31.6                           9.7    9.80  )-----------( 453      ( 24 Apr 2019 07:00 )             31.9        04-26    135  |  97<L>  |  8   ----------------------------<  177<H>  4.3   |  22  |  0.73    Ca    8.6      26 Apr 2019 14:28  Phos  3.5     04-26  Mg     1.4     04-26      PT/INR - ( 26 Apr 2019 06:40 )   PT: 13.3 SEC;   INR: 1.19       Blood Type: O Positive      MEDICATIONS  (STANDING):  acetaminophen  IVPB .. 1000 milliGRAM(s) IV Intermittent once  acetaminophen  IVPB .. 1000 milliGRAM(s) IV Intermittent once  atorvastatin 20 milliGRAM(s) Oral at bedtime  heparin  Injectable 5000 Unit(s) SubCutaneous every 8 hours  HYDROmorphone (10 MICROgram(s)/mL) + BUpivacaine 0.0625% in 0.9% Sodium Chloride PCEA 250 milliLiter(s) Epidural PCA Continuous  labetalol 100 milliGRAM(s) Oral two times a day  lactated ringers. 1000 milliLiter(s) (125 mL/Hr) IV Continuous <Continuous>  lisinopril 10 milliGRAM(s) Oral daily  piperacillin/tazobactam IVPB. 3.375 Gram(s) IV Intermittent every 8 hours    MEDICATIONS  (PRN):  diphenhydrAMINE   Injectable 12.5 milliGRAM(s) IV Push every 4 hours PRN Pruritus  HYDROmorphone  Injectable 0.5 milliGRAM(s) IV Push every 3 hours PRN for breakthrough pain while on epidural infusion  HYDROmorphone (10 MICROgram(s)/mL) + BUpivacaine 0.0625% in 0.9% Sodium Chloride PCEA Rescue Clinician  Bolus 5 milliLiter(s) Epidural every 15 minutes PRN for Pain Score greater than 6  nalbuphine Injectable 2.5 milliGRAM(s) IV Push every 6 hours PRN Pruritus  naloxone Injectable 0.1 milliGRAM(s) IV Push every 3 minutes PRN For ANY of the following changes in patient status:  A. RR LESS THAN 10 breaths per minute, B. Oxygen saturation LESS THAN 90%, C. Sedation score of 6  ondansetron Injectable 4 milliGRAM(s) IV Push every 6 hours PRN Nausea R2 GYN ONC PROGRESS NOTE    POD#1   HD#2    SUBJECTIVE:  Patient seen and examined at bedside, no acute overnight events. She reports scratchy throat from the ETT.  She has pain well controlled.  Daniel catheter in place.  Pt has not been out of bed.    She is taking sips of clears.  Denies CP, SOB, N/V, fevers, and chills.    OBJECTIVE:  Vital Signs Last 24 Hours  T(C): 36.4 (04-27-19 @ 02:09), Max: 36.7 (04-26-19 @ 06:50)  HR: 78 (04-27-19 @ 02:09) (76 - 89)  BP: 101/56 (04-27-19 @ 02:09) (97/53 - 137/70)  RR: 20 (04-27-19 @ 02:09) (14 - 25)  SpO2: 97% (04-27-19 @ 02:09) (96% - 100%)    I&O's Summary    25 Apr 2019 07:01  -  26 Apr 2019 07:00  --------------------------------------------------------  IN: 2880 mL / OUT: 327.5 mL / NET: 2552.5 mL    26 Apr 2019 07:01  -  27 Apr 2019 05:52  --------------------------------------------------------  IN: 625 mL / OUT: 525 mL / NET: 100 mL    Physical Exam:  General: NAD  CV: RRR  Lungs: CTA-B  Abdomen: Soft, appropriately tender, non-distended  Incision: CDI  Ext: No pain or swelling    Labs:                        9.5    15.57 )-----------( 463      ( 26 Apr 2019 14:28 )             32.0                            10.1   12.68 )-----------( 457      ( 26 Apr 2019 06:40 )             33.4                             9.5    13.88 )-----------( 399      ( 25 Apr 2019 05:35 )             31.6                           9.7    9.80  )-----------( 453      ( 24 Apr 2019 07:00 )             31.9        04-26    135  |  97<L>  |  8   ----------------------------<  177<H>  4.3   |  22  |  0.73    Ca    8.6      26 Apr 2019 14:28  Phos  3.5     04-26  Mg     1.4     04-26      PT/INR - ( 26 Apr 2019 06:40 )   PT: 13.3 SEC;   INR: 1.19       Blood Type: O Positive      MEDICATIONS  (STANDING):  acetaminophen  IVPB .. 1000 milliGRAM(s) IV Intermittent once  acetaminophen  IVPB .. 1000 milliGRAM(s) IV Intermittent once  atorvastatin 20 milliGRAM(s) Oral at bedtime  heparin  Injectable 5000 Unit(s) SubCutaneous every 8 hours  HYDROmorphone (10 MICROgram(s)/mL) + BUpivacaine 0.0625% in 0.9% Sodium Chloride PCEA 250 milliLiter(s) Epidural PCA Continuous  labetalol 100 milliGRAM(s) Oral two times a day  lactated ringers. 1000 milliLiter(s) (125 mL/Hr) IV Continuous <Continuous>  lisinopril 10 milliGRAM(s) Oral daily  piperacillin/tazobactam IVPB. 3.375 Gram(s) IV Intermittent every 8 hours    MEDICATIONS  (PRN):  diphenhydrAMINE   Injectable 12.5 milliGRAM(s) IV Push every 4 hours PRN Pruritus  HYDROmorphone  Injectable 0.5 milliGRAM(s) IV Push every 3 hours PRN for breakthrough pain while on epidural infusion  HYDROmorphone (10 MICROgram(s)/mL) + BUpivacaine 0.0625% in 0.9% Sodium Chloride PCEA Rescue Clinician  Bolus 5 milliLiter(s) Epidural every 15 minutes PRN for Pain Score greater than 6  nalbuphine Injectable 2.5 milliGRAM(s) IV Push every 6 hours PRN Pruritus  naloxone Injectable 0.1 milliGRAM(s) IV Push every 3 minutes PRN For ANY of the following changes in patient status:  A. RR LESS THAN 10 breaths per minute, B. Oxygen saturation LESS THAN 90%, C. Sedation score of 6  ondansetron Injectable 4 milliGRAM(s) IV Push every 6 hours PRN Nausea

## 2019-04-27 NOTE — PROGRESS NOTE ADULT - SUBJECTIVE AND OBJECTIVE BOX
ANESTHESIA POSTOP CHECK    63y Female POSTOP DAY 1 S/P     Vital Signs Last 24 Hrs  T(C): 36.5 (27 Apr 2019 06:55), Max: 36.6 (26 Apr 2019 18:57)  T(F): 97.7 (27 Apr 2019 06:55), Max: 97.9 (26 Apr 2019 18:57)  HR: 82 (27 Apr 2019 06:55) (76 - 89)  BP: 100/60 (27 Apr 2019 06:55) (97/53 - 137/70)  BP(mean): 80 (26 Apr 2019 17:15) (63 - 87)  RR: 16 (27 Apr 2019 06:55) (14 - 25)  SpO2: 99% (27 Apr 2019 06:55) (97% - 100%)  I&O's Summary    26 Apr 2019 07:01  -  27 Apr 2019 07:00  --------------------------------------------------------  IN: 1325 mL / OUT: 925 mL / NET: 400 mL        [X ] NO APPARENT ANESTHESIA COMPLICATIONS      Comments:

## 2019-04-27 NOTE — PROGRESS NOTE ADULT - ASSESSMENT
62 yo female now POD1 s/p ARELIS, BSO (for fibroid uterus), s/p low anterior resection with drainage of abcess cavity & excision of chronic IR drain tract by surgical team.  Pt is stable and progressing normally postop.

## 2019-04-27 NOTE — PROGRESS NOTE ADULT - SUBJECTIVE AND OBJECTIVE BOX
Anesthesia Pain Management Service: Day __ of Epidural    SUBJECTIVE: Patient doing well with PCEA and no problems.  Pain Scale Score:   Refer to charted pain scores    THERAPY:  [x ] Epidural Bupivacaine 0.0625% and Hydromorphone  		[ ] 10 micrograms/mL	[ ] 5 micrograms/mL  [ ] Epidural Bupivacaine 0.0625% and Fentanyl - 2 micrograms/mL  [ ] Epidural Ropivacaine 0.1% plain – 1 mg/mL  [ ] Patient Controlled Regional Anesthesia (PCRA) Ropivacaine  		[ ] 0.2%			[ ] 0.1%    Demand dose __3_ lockout __15_ (minutes) Continuous Rate ___ Total:134.6 ___ Daily      MEDICATIONS  (STANDING):  acetaminophen  IVPB .. 1000 milliGRAM(s) IV Intermittent once  atorvastatin 20 milliGRAM(s) Oral at bedtime  heparin  Injectable 5000 Unit(s) SubCutaneous every 8 hours  HYDROmorphone (10 MICROgram(s)/mL) + BUpivacaine 0.0625% in 0.9% Sodium Chloride PCEA 250 milliLiter(s) Epidural PCA Continuous  labetalol 100 milliGRAM(s) Oral two times a day  lactated ringers. 1000 milliLiter(s) (125 mL/Hr) IV Continuous <Continuous>  lisinopril 10 milliGRAM(s) Oral daily  piperacillin/tazobactam IVPB. 3.375 Gram(s) IV Intermittent every 8 hours    MEDICATIONS  (PRN):  diphenhydrAMINE   Injectable 12.5 milliGRAM(s) IV Push every 4 hours PRN Pruritus  HYDROmorphone  Injectable 0.5 milliGRAM(s) IV Push every 3 hours PRN for breakthrough pain while on epidural infusion  HYDROmorphone (10 MICROgram(s)/mL) + BUpivacaine 0.0625% in 0.9% Sodium Chloride PCEA Rescue Clinician  Bolus 5 milliLiter(s) Epidural every 15 minutes PRN for Pain Score greater than 6  nalbuphine Injectable 2.5 milliGRAM(s) IV Push every 6 hours PRN Pruritus  naloxone Injectable 0.1 milliGRAM(s) IV Push every 3 minutes PRN For ANY of the following changes in patient status:  A. RR LESS THAN 10 breaths per minute, B. Oxygen saturation LESS THAN 90%, C. Sedation score of 6  ondansetron Injectable 4 milliGRAM(s) IV Push every 6 hours PRN Nausea      OBJECTIVE:    Assessment of Catheter Site:	[ ] Left	[ ] Right  [x ] Epidural 	[ ] Femoral	      [ ] Saphenous   [ ] Supraclavicular   [ ] Other:    [x ] Dressing intact	[x ] Site non-tender	[ x] Site without erythema, discharge, edema  [x ] Epidural tubing and connection checked	[x] Gross neurological exam within normal limits  [ ] Catheter removed – tip intact		[x ] Afebrile	[ ] Febrile: ___    PT/INR - ( 27 Apr 2019 06:00 )   PT: 14.3 SEC;   INR: 1.25          PTT - ( 27 Apr 2019 06:00 )  PTT:27.7 SEC                      8.7    12.05 )-----------( 366      ( 27 Apr 2019 06:00 )             28.6     Vital Signs Last 24 Hrs  T(C): 36.5 (04-27-19 @ 06:55), Max: 36.6 (04-26-19 @ 18:57)  T(F): 97.7 (04-27-19 @ 06:55), Max: 97.9 (04-26-19 @ 18:57)  HR: 82 (04-27-19 @ 06:55) (76 - 89)  BP: 100/60 (04-27-19 @ 06:55) (97/53 - 137/70)  BP(mean): 80 (04-26-19 @ 17:15) (63 - 87)  RR: 16 (04-27-19 @ 06:55) (14 - 25)  SpO2: 99% (04-27-19 @ 06:55) (97% - 100%)      Sedation Score:	[x ] Alert	[ ] Drowsy	[ ] Arousable	[ ] Asleep	[ ] Unresponsive    Side Effects:	[x ] None	[ ] Nausea	[ ] Vomiting	[ ] Pruritus  		[ ] Weakness		[ ] Numbness	[ ] Other:    ASSESSMENT/ PLAN:    Therapy to  be:	[x ] Continue   [ ] Discontinued   [ ] Change to prn Analgesics    Documentation and Verification of current medications:  [ X ] Done	[ ] Not done, not eligible, reason:    Comments: Doing OK with epidural and may continue.

## 2019-04-28 LAB
ANION GAP SERPL CALC-SCNC: 10 MMO/L — SIGNIFICANT CHANGE UP (ref 7–14)
APTT BLD: 30.5 SEC — SIGNIFICANT CHANGE UP (ref 27.5–36.3)
BUN SERPL-MCNC: 6 MG/DL — LOW (ref 7–23)
CALCIUM SERPL-MCNC: 8.4 MG/DL — SIGNIFICANT CHANGE UP (ref 8.4–10.5)
CHLORIDE SERPL-SCNC: 99 MMOL/L — SIGNIFICANT CHANGE UP (ref 98–107)
CO2 SERPL-SCNC: 27 MMOL/L — SIGNIFICANT CHANGE UP (ref 22–31)
CREAT SERPL-MCNC: 0.77 MG/DL — SIGNIFICANT CHANGE UP (ref 0.5–1.3)
GLUCOSE SERPL-MCNC: 128 MG/DL — HIGH (ref 70–99)
HCT VFR BLD CALC: 26.5 % — LOW (ref 34.5–45)
HGB BLD-MCNC: 8 G/DL — LOW (ref 11.5–15.5)
INR BLD: 1.32 — HIGH (ref 0.88–1.17)
MAGNESIUM SERPL-MCNC: 1.8 MG/DL — SIGNIFICANT CHANGE UP (ref 1.6–2.6)
MCHC RBC-ENTMCNC: 25.3 PG — LOW (ref 27–34)
MCHC RBC-ENTMCNC: 30.2 % — LOW (ref 32–36)
MCV RBC AUTO: 83.9 FL — SIGNIFICANT CHANGE UP (ref 80–100)
NRBC # FLD: 0 K/UL — SIGNIFICANT CHANGE UP (ref 0–0)
PHOSPHATE SERPL-MCNC: 2.6 MG/DL — SIGNIFICANT CHANGE UP (ref 2.5–4.5)
PLATELET # BLD AUTO: 349 K/UL — SIGNIFICANT CHANGE UP (ref 150–400)
PMV BLD: 9 FL — SIGNIFICANT CHANGE UP (ref 7–13)
POTASSIUM SERPL-MCNC: 3.7 MMOL/L — SIGNIFICANT CHANGE UP (ref 3.5–5.3)
POTASSIUM SERPL-SCNC: 3.7 MMOL/L — SIGNIFICANT CHANGE UP (ref 3.5–5.3)
PROTHROM AB SERPL-ACNC: 14.8 SEC — HIGH (ref 9.8–13.1)
RBC # BLD: 3.16 M/UL — LOW (ref 3.8–5.2)
RBC # FLD: 14.7 % — HIGH (ref 10.3–14.5)
SODIUM SERPL-SCNC: 136 MMOL/L — SIGNIFICANT CHANGE UP (ref 135–145)
WBC # BLD: 15.06 K/UL — HIGH (ref 3.8–10.5)
WBC # FLD AUTO: 15.06 K/UL — HIGH (ref 3.8–10.5)

## 2019-04-28 RX ORDER — POTASSIUM PHOSPHATE, MONOBASIC POTASSIUM PHOSPHATE, DIBASIC 236; 224 MG/ML; MG/ML
15 INJECTION, SOLUTION INTRAVENOUS ONCE
Qty: 0 | Refills: 0 | Status: COMPLETED | OUTPATIENT
Start: 2019-04-28 | End: 2019-04-28

## 2019-04-28 RX ADMIN — LISINOPRIL 10 MILLIGRAM(S): 2.5 TABLET ORAL at 05:40

## 2019-04-28 RX ADMIN — PIPERACILLIN AND TAZOBACTAM 25 GRAM(S): 4; .5 INJECTION, POWDER, LYOPHILIZED, FOR SOLUTION INTRAVENOUS at 05:39

## 2019-04-28 RX ADMIN — HEPARIN SODIUM 5000 UNIT(S): 5000 INJECTION INTRAVENOUS; SUBCUTANEOUS at 14:46

## 2019-04-28 RX ADMIN — ATORVASTATIN CALCIUM 20 MILLIGRAM(S): 80 TABLET, FILM COATED ORAL at 21:20

## 2019-04-28 RX ADMIN — HEPARIN SODIUM 5000 UNIT(S): 5000 INJECTION INTRAVENOUS; SUBCUTANEOUS at 05:40

## 2019-04-28 RX ADMIN — PIPERACILLIN AND TAZOBACTAM 25 GRAM(S): 4; .5 INJECTION, POWDER, LYOPHILIZED, FOR SOLUTION INTRAVENOUS at 15:42

## 2019-04-28 RX ADMIN — Medication 100 MILLIGRAM(S): at 05:40

## 2019-04-28 RX ADMIN — Medication 100 MILLIGRAM(S): at 18:42

## 2019-04-28 RX ADMIN — HEPARIN SODIUM 5000 UNIT(S): 5000 INJECTION INTRAVENOUS; SUBCUTANEOUS at 21:20

## 2019-04-28 RX ADMIN — POTASSIUM PHOSPHATE, MONOBASIC POTASSIUM PHOSPHATE, DIBASIC 62.5 MILLIMOLE(S): 236; 224 INJECTION, SOLUTION INTRAVENOUS at 10:19

## 2019-04-28 RX ADMIN — PIPERACILLIN AND TAZOBACTAM 25 GRAM(S): 4; .5 INJECTION, POWDER, LYOPHILIZED, FOR SOLUTION INTRAVENOUS at 21:19

## 2019-04-28 NOTE — PROGRESS NOTE ADULT - ASSESSMENT
Kell Washington is a 63 y.o. woman with history of HTN, HLD, uterine fibroid s/p myomectomy and perforated diverticulitis who was initially scheduled for hysterectomy, bilateral salpingo-oophorectomy, LAR and enterocutaneous fistula on 4/5, which was delayed for fever who presented to the ED on 4/22 for increased drainage from her drain. s/p open LAR, excision of IR drain tract, and ARELIS BSO 4/26.     Plan:  - Pain control - PCEA  - f/u am labs, monitor coags  - Daniel  - CLD  - f/u GI function  - Abx: Zosyn, ends 5/1  - Dvt ppx: HepSQ    A Surgery  [id number]

## 2019-04-28 NOTE — PROGRESS NOTE ADULT - SUBJECTIVE AND OBJECTIVE BOX
POD# 2  HD#3    Patient seen and examined at bedside. Overnight, she had one episode of vomiting when she moved from the bed to the chair. This AM, no acute complaints. Pain well controlled with PCA pump. Patient has not yet ambulated or passed flatus. She states that she has tolerated clears well, primarily drinking water. Drew in place. Denies CP, SOB, nausea, fevers, and chills.    Vital Signs Last 24 Hours  T(C): 36.8 (04-28-19 @ 05:38), Max: 37.2 (04-27-19 @ 22:01)  HR: 80 (04-28-19 @ 05:38) (77 - 97)  BP: 123/65 (04-28-19 @ 05:38) (92/57 - 127/69)  RR: 18 (04-28-19 @ 05:38) (18 - 18)  SpO2: 98% (04-28-19 @ 05:38) (95% - 98%)    I&O's Summary    27 Apr 2019 07:01  -  28 Apr 2019 07:00  --------------------------------------------------------  IN: 2140 mL / OUT: 975 mL / NET: 1165 mL        Physical Exam:  General: NAD  CV: RRR  Lungs: CTAB  Abdomen: Soft, appropirately-tender, non-distended, +BS  Incision: midline vertical incision, C/D/I, dressing in place  : drew in situ, meza urine in drew, UOP adequate  Ext: No pain or swelling, SCDs in place    Labs:                        8.0    15.06 )-----------( 349      ( 28 Apr 2019 05:12 )             26.5   baso x      eos x      imm gran x      lymph x      mono x      poly x                            8.7    12.05 )-----------( 366      ( 27 Apr 2019 06:00 )             28.6   baso x      eos x      imm gran x      lymph x      mono x      poly x                            9.5    15.57 )-----------( 463      ( 26 Apr 2019 14:28 )             32.0   baso x      eos x      imm gran x      lymph x      mono x      poly x                            10.1   12.68 )-----------( 457      ( 26 Apr 2019 06:40 )             33.4   baso x      eos x      imm gran x      lymph x      mono x      poly x          MEDICATIONS  (STANDING):  atorvastatin 20 milliGRAM(s) Oral at bedtime  dextrose 5% + sodium chloride 0.9%. 1000 milliLiter(s) (75 mL/Hr) IV Continuous <Continuous>  heparin  Injectable 5000 Unit(s) SubCutaneous every 8 hours  HYDROmorphone (10 MICROgram(s)/mL) + BUpivacaine 0.0625% in 0.9% Sodium Chloride PCEA 250 milliLiter(s) Epidural PCA Continuous  labetalol 100 milliGRAM(s) Oral two times a day  lisinopril 10 milliGRAM(s) Oral daily  piperacillin/tazobactam IVPB. 3.375 Gram(s) IV Intermittent every 8 hours    MEDICATIONS  (PRN):  benzocaine 15 mG/menthol 3.6 mG Lozenge 1 Lozenge Oral four times a day PRN Sore Throat  diphenhydrAMINE   Injectable 12.5 milliGRAM(s) IV Push every 4 hours PRN Pruritus  HYDROmorphone  Injectable 0.5 milliGRAM(s) IV Push every 3 hours PRN for breakthrough pain while on epidural infusion  HYDROmorphone (10 MICROgram(s)/mL) + BUpivacaine 0.0625% in 0.9% Sodium Chloride PCEA Rescue Clinician  Bolus 5 milliLiter(s) Epidural every 15 minutes PRN for Pain Score greater than 6  nalbuphine Injectable 2.5 milliGRAM(s) IV Push every 6 hours PRN Pruritus  naloxone Injectable 0.1 milliGRAM(s) IV Push every 3 minutes PRN For ANY of the following changes in patient status:  A. RR LESS THAN 10 breaths per minute, B. Oxygen saturation LESS THAN 90%, C. Sedation score of 6  ondansetron Injectable 4 milliGRAM(s) IV Push every 6 hours PRN Nausea

## 2019-04-28 NOTE — PROGRESS NOTE ADULT - PROBLEM SELECTOR PLAN 1
- plan per general surgery  - routine post-op care  - will continue to follow clinical course    VANESSA Vargas pgy2

## 2019-04-28 NOTE — PROGRESS NOTE ADULT - SUBJECTIVE AND OBJECTIVE BOX
GENERAL SURGERY DAILY PROGRESS NOTE:     Subjective:  Pt seen and examined. No acute events overnight. Pain well controlled. Denies n/v. Denies flatus or BMTolerating clears.       Objective:  Vital Signs Last 24 Hrs  T(C): 37.2 (27 Apr 2019 22:01), Max: 37.2 (27 Apr 2019 22:01)  T(F): 98.9 (27 Apr 2019 22:01), Max: 98.9 (27 Apr 2019 22:01)  HR: 91 (27 Apr 2019 22:01) (77 - 91)  BP: 127/69 (27 Apr 2019 22:01) (92/57 - 127/69)  BP(mean): --  RR: 18 (27 Apr 2019 22:01) (16 - 20)  SpO2: 95% (27 Apr 2019 22:01) (95% - 99%)    I&O's Detail    26 Apr 2019 07:01  -  27 Apr 2019 07:00  --------------------------------------------------------  IN:    IV PiggyBack: 200 mL    lactated ringers.: 1125 mL  Total IN: 1325 mL    OUT:    Indwelling Catheter - Urethral: 925 mL  Total OUT: 925 mL    Total NET: 400 mL      27 Apr 2019 07:01  -  28 Apr 2019 00:53  --------------------------------------------------------  IN:    dextrose 5% + sodium chloride 0.9%.: 975 mL    IV PiggyBack: 200 mL    lactated ringers.: 375 mL    Oral Fluid: 290 mL  Total IN: 1840 mL    OUT:    Indwelling Catheter - Urethral: 675 mL  Total OUT: 675 mL    Total NET: 1165 mL          MEDICATIONS  (STANDING):  atorvastatin 20 milliGRAM(s) Oral at bedtime  dextrose 5% + sodium chloride 0.9%. 1000 milliLiter(s) (75 mL/Hr) IV Continuous <Continuous>  heparin  Injectable 5000 Unit(s) SubCutaneous every 8 hours  HYDROmorphone (10 MICROgram(s)/mL) + BUpivacaine 0.0625% in 0.9% Sodium Chloride PCEA 250 milliLiter(s) Epidural PCA Continuous  labetalol 100 milliGRAM(s) Oral two times a day  lisinopril 10 milliGRAM(s) Oral daily  piperacillin/tazobactam IVPB. 3.375 Gram(s) IV Intermittent every 8 hours    MEDICATIONS  (PRN):  benzocaine 15 mG/menthol 3.6 mG Lozenge 1 Lozenge Oral four times a day PRN Sore Throat  diphenhydrAMINE   Injectable 12.5 milliGRAM(s) IV Push every 4 hours PRN Pruritus  HYDROmorphone  Injectable 0.5 milliGRAM(s) IV Push every 3 hours PRN for breakthrough pain while on epidural infusion  HYDROmorphone (10 MICROgram(s)/mL) + BUpivacaine 0.0625% in 0.9% Sodium Chloride PCEA Rescue Clinician  Bolus 5 milliLiter(s) Epidural every 15 minutes PRN for Pain Score greater than 6  nalbuphine Injectable 2.5 milliGRAM(s) IV Push every 6 hours PRN Pruritus  naloxone Injectable 0.1 milliGRAM(s) IV Push every 3 minutes PRN For ANY of the following changes in patient status:  A. RR LESS THAN 10 breaths per minute, B. Oxygen saturation LESS THAN 90%, C. Sedation score of 6  ondansetron Injectable 4 milliGRAM(s) IV Push every 6 hours PRN Nausea                            8.7    12.05 )-----------( 366      ( 27 Apr 2019 06:00 )             28.6       04-27    134<L>  |  95<L>  |  8   ----------------------------<  125<H>  4.4   |  27  |  0.88    Ca    8.8      27 Apr 2019 06:00  Phos  4.7     04-27  Mg     2.1     04-27          PHYSICAL EXAM  NAD, awake and alert  Respirations nonlabored  Abdomen soft, appropriately tender, nondistended, dressing w/ some sanguinous staining  No guarding or rebound tenderness

## 2019-04-28 NOTE — PROGRESS NOTE ADULT - ASSESSMENT
64y/o POD#2 from Ex-lap, LAR with re-anastomosis drainage of abscess cavity, excision of chronic IR drain, ARELIS, BSO for perforated diverticulitis and fibroid uterus in stable condition.

## 2019-04-28 NOTE — PROGRESS NOTE ADULT - SUBJECTIVE AND OBJECTIVE BOX
Anesthesia Pain Management Service: Day __ of Epidural    SUBJECTIVE: Patient doing well with PCEA and no problems.  Pain Scale Score:   Refer to charted pain scores    THERAPY:  [x ] Epidural Bupivacaine 0.0625% and Hydromorphone  		[ ] 10 micrograms/mL	[ ] 5 micrograms/mL  [ ] Epidural Bupivacaine 0.0625% and Fentanyl - 2 micrograms/mL  [ ] Epidural Ropivacaine 0.1% plain – 1 mg/mL  [ ] Patient Controlled Regional Anesthesia (PCRA) Ropivacaine  		[ ] 0.2%			[ ] 0.1%    Demand dose __3_ lockout __15_ (minutes) Continuous Rate ___ Total: ___ Daily      MEDICATIONS  (STANDING):  atorvastatin 20 milliGRAM(s) Oral at bedtime  dextrose 5% + sodium chloride 0.9%. 1000 milliLiter(s) (75 mL/Hr) IV Continuous <Continuous>  heparin  Injectable 5000 Unit(s) SubCutaneous every 8 hours  HYDROmorphone (10 MICROgram(s)/mL) + BUpivacaine 0.0625% in 0.9% Sodium Chloride PCEA 250 milliLiter(s) Epidural PCA Continuous  labetalol 100 milliGRAM(s) Oral two times a day  lisinopril 10 milliGRAM(s) Oral daily  piperacillin/tazobactam IVPB. 3.375 Gram(s) IV Intermittent every 8 hours    MEDICATIONS  (PRN):  benzocaine 15 mG/menthol 3.6 mG Lozenge 1 Lozenge Oral four times a day PRN Sore Throat  diphenhydrAMINE   Injectable 12.5 milliGRAM(s) IV Push every 4 hours PRN Pruritus  HYDROmorphone  Injectable 0.5 milliGRAM(s) IV Push every 3 hours PRN for breakthrough pain while on epidural infusion  HYDROmorphone (10 MICROgram(s)/mL) + BUpivacaine 0.0625% in 0.9% Sodium Chloride PCEA Rescue Clinician  Bolus 5 milliLiter(s) Epidural every 15 minutes PRN for Pain Score greater than 6  nalbuphine Injectable 2.5 milliGRAM(s) IV Push every 6 hours PRN Pruritus  naloxone Injectable 0.1 milliGRAM(s) IV Push every 3 minutes PRN For ANY of the following changes in patient status:  A. RR LESS THAN 10 breaths per minute, B. Oxygen saturation LESS THAN 90%, C. Sedation score of 6  ondansetron Injectable 4 milliGRAM(s) IV Push every 6 hours PRN Nausea      OBJECTIVE:    Assessment of Catheter Site:	[ ] Left	[ ] Right  [x ] Epidural 	[ ] Femoral	      [ ] Saphenous   [ ] Supraclavicular   [ ] Other:    [x ] Dressing intact	[x ] Site non-tender	[ x] Site without erythema, discharge, edema  [x ] Epidural tubing and connection checked	[x] Gross neurological exam within normal limits  [ ] Catheter removed – tip intact		[x ] Afebrile	[ ] Febrile: ___    PT/INR - ( 28 Apr 2019 05:12 )   PT: 14.8 SEC;   INR: 1.32          PTT - ( 28 Apr 2019 05:12 )  PTT:30.5 SEC                      8.0    15.06 )-----------( 349      ( 28 Apr 2019 05:12 )             26.5     Vital Signs Last 24 Hrs  T(C): 37 (04-28-19 @ 09:29), Max: 37.2 (04-27-19 @ 22:01)  T(F): 98.6 (04-28-19 @ 09:29), Max: 98.9 (04-27-19 @ 22:01)  HR: 81 (04-28-19 @ 09:29) (80 - 97)  BP: 111/58 (04-28-19 @ 09:29) (111/58 - 127/69)  BP(mean): --  RR: 18 (04-28-19 @ 09:29) (18 - 18)  SpO2: 97% (04-28-19 @ 09:29) (95% - 98%)      Sedation Score:	[x ] Alert	[ ] Drowsy	[ ] Arousable	[ ] Asleep	[ ] Unresponsive    Side Effects:	[x ] None	[ ] Nausea	[ ] Vomiting	[ ] Pruritus  		[ ] Weakness		[ ] Numbness	[ ] Other:    ASSESSMENT/ PLAN:    Therapy to  be:	[x ] Continue   [ ] Discontinued   [ ] Change to prn Analgesics    Documentation and Verification of current medications:  [ X ] Done	[ ] Not done, not eligible, reason:    Comments: Doing OK with epidural and may continue.

## 2019-04-29 LAB
ANION GAP SERPL CALC-SCNC: 11 MMO/L — SIGNIFICANT CHANGE UP (ref 7–14)
APTT BLD: 28.6 SEC — SIGNIFICANT CHANGE UP (ref 27.5–36.3)
BUN SERPL-MCNC: 5 MG/DL — LOW (ref 7–23)
CALCIUM SERPL-MCNC: 8.6 MG/DL — SIGNIFICANT CHANGE UP (ref 8.4–10.5)
CHLORIDE SERPL-SCNC: 96 MMOL/L — LOW (ref 98–107)
CO2 SERPL-SCNC: 27 MMOL/L — SIGNIFICANT CHANGE UP (ref 22–31)
CREAT SERPL-MCNC: 0.72 MG/DL — SIGNIFICANT CHANGE UP (ref 0.5–1.3)
GLUCOSE SERPL-MCNC: 118 MG/DL — HIGH (ref 70–99)
HCT VFR BLD CALC: 28.2 % — LOW (ref 34.5–45)
HGB BLD-MCNC: 8.4 G/DL — LOW (ref 11.5–15.5)
INR BLD: 1.22 — HIGH (ref 0.88–1.17)
MAGNESIUM SERPL-MCNC: 1.9 MG/DL — SIGNIFICANT CHANGE UP (ref 1.6–2.6)
MCHC RBC-ENTMCNC: 25.5 PG — LOW (ref 27–34)
MCHC RBC-ENTMCNC: 29.8 % — LOW (ref 32–36)
MCV RBC AUTO: 85.5 FL — SIGNIFICANT CHANGE UP (ref 80–100)
NRBC # FLD: 0 K/UL — SIGNIFICANT CHANGE UP (ref 0–0)
PHOSPHATE SERPL-MCNC: 3 MG/DL — SIGNIFICANT CHANGE UP (ref 2.5–4.5)
PLATELET # BLD AUTO: 342 K/UL — SIGNIFICANT CHANGE UP (ref 150–400)
PMV BLD: 9.5 FL — SIGNIFICANT CHANGE UP (ref 7–13)
POTASSIUM SERPL-MCNC: 3.6 MMOL/L — SIGNIFICANT CHANGE UP (ref 3.5–5.3)
POTASSIUM SERPL-SCNC: 3.6 MMOL/L — SIGNIFICANT CHANGE UP (ref 3.5–5.3)
PROTHROM AB SERPL-ACNC: 14 SEC — HIGH (ref 9.8–13.1)
RBC # BLD: 3.3 M/UL — LOW (ref 3.8–5.2)
RBC # FLD: 15 % — HIGH (ref 10.3–14.5)
SODIUM SERPL-SCNC: 134 MMOL/L — LOW (ref 135–145)
WBC # BLD: 10.95 K/UL — HIGH (ref 3.8–10.5)
WBC # FLD AUTO: 10.95 K/UL — HIGH (ref 3.8–10.5)

## 2019-04-29 RX ORDER — ONDANSETRON 8 MG/1
4 TABLET, FILM COATED ORAL ONCE
Qty: 0 | Refills: 0 | Status: COMPLETED | OUTPATIENT
Start: 2019-04-29 | End: 2019-04-30

## 2019-04-29 RX ORDER — POTASSIUM CHLORIDE 20 MEQ
10 PACKET (EA) ORAL
Qty: 0 | Refills: 0 | Status: COMPLETED | OUTPATIENT
Start: 2019-04-29 | End: 2019-04-29

## 2019-04-29 RX ORDER — SODIUM CHLORIDE 9 MG/ML
1000 INJECTION, SOLUTION INTRAVENOUS
Qty: 0 | Refills: 0 | Status: DISCONTINUED | OUTPATIENT
Start: 2019-04-29 | End: 2019-05-05

## 2019-04-29 RX ORDER — HYDROMORPHONE HYDROCHLORIDE 2 MG/ML
0.25 INJECTION INTRAMUSCULAR; INTRAVENOUS; SUBCUTANEOUS
Qty: 0 | Refills: 0 | Status: DISCONTINUED | OUTPATIENT
Start: 2019-04-29 | End: 2019-05-05

## 2019-04-29 RX ORDER — MAGNESIUM SULFATE 500 MG/ML
1 VIAL (ML) INJECTION ONCE
Qty: 0 | Refills: 0 | Status: COMPLETED | OUTPATIENT
Start: 2019-04-29 | End: 2019-04-29

## 2019-04-29 RX ORDER — ONDANSETRON 8 MG/1
4 TABLET, FILM COATED ORAL ONCE
Qty: 0 | Refills: 0 | Status: COMPLETED | OUTPATIENT
Start: 2019-04-29 | End: 2019-04-29

## 2019-04-29 RX ORDER — HYDROMORPHONE HYDROCHLORIDE 2 MG/ML
0.5 INJECTION INTRAMUSCULAR; INTRAVENOUS; SUBCUTANEOUS
Qty: 0 | Refills: 0 | Status: DISCONTINUED | OUTPATIENT
Start: 2019-04-29 | End: 2019-05-05

## 2019-04-29 RX ADMIN — HEPARIN SODIUM 5000 UNIT(S): 5000 INJECTION INTRAVENOUS; SUBCUTANEOUS at 21:48

## 2019-04-29 RX ADMIN — ATORVASTATIN CALCIUM 20 MILLIGRAM(S): 80 TABLET, FILM COATED ORAL at 21:48

## 2019-04-29 RX ADMIN — SODIUM CHLORIDE 100 MILLILITER(S): 9 INJECTION, SOLUTION INTRAVENOUS at 19:00

## 2019-04-29 RX ADMIN — Medication 100 GRAM(S): at 10:26

## 2019-04-29 RX ADMIN — Medication 100 MILLIEQUIVALENT(S): at 13:00

## 2019-04-29 RX ADMIN — SODIUM CHLORIDE 75 MILLILITER(S): 9 INJECTION, SOLUTION INTRAVENOUS at 14:46

## 2019-04-29 RX ADMIN — Medication 100 MILLIEQUIVALENT(S): at 14:51

## 2019-04-29 RX ADMIN — PIPERACILLIN AND TAZOBACTAM 25 GRAM(S): 4; .5 INJECTION, POWDER, LYOPHILIZED, FOR SOLUTION INTRAVENOUS at 06:09

## 2019-04-29 RX ADMIN — HEPARIN SODIUM 5000 UNIT(S): 5000 INJECTION INTRAVENOUS; SUBCUTANEOUS at 06:09

## 2019-04-29 RX ADMIN — ONDANSETRON 4 MILLIGRAM(S): 8 TABLET, FILM COATED ORAL at 20:06

## 2019-04-29 RX ADMIN — PIPERACILLIN AND TAZOBACTAM 25 GRAM(S): 4; .5 INJECTION, POWDER, LYOPHILIZED, FOR SOLUTION INTRAVENOUS at 14:45

## 2019-04-29 RX ADMIN — Medication 100 MILLIGRAM(S): at 18:09

## 2019-04-29 RX ADMIN — PIPERACILLIN AND TAZOBACTAM 25 GRAM(S): 4; .5 INJECTION, POWDER, LYOPHILIZED, FOR SOLUTION INTRAVENOUS at 21:55

## 2019-04-29 RX ADMIN — Medication 100 MILLIEQUIVALENT(S): at 10:00

## 2019-04-29 NOTE — PROGRESS NOTE ADULT - SUBJECTIVE AND OBJECTIVE BOX
Anesthesia Pain Management Service    SUBJECTIVE: Pt doing well with PCEA without problems reported.    Therapy:	  [ ] IV PCA	   [ X] Epidural           [ ] s/p Spinal Opoid              [ ] Postpartum infusion	  [ ] Patient controlled regional anesthesia (PCRA)    [ ] prn Analgesics    Allergies    No Known Allergies    Intolerances      MEDICATIONS  (STANDING):  atorvastatin 20 milliGRAM(s) Oral at bedtime  dextrose 5% + sodium chloride 0.9% 1000 milliLiter(s) (75 mL/Hr) IV Continuous <Continuous>  heparin  Injectable 5000 Unit(s) SubCutaneous every 8 hours  HYDROmorphone (10 MICROgram(s)/mL) + BUpivacaine 0.0625% in 0.9% Sodium Chloride PCEA 250 milliLiter(s) Epidural PCA Continuous  labetalol 100 milliGRAM(s) Oral two times a day  lisinopril 10 milliGRAM(s) Oral daily  piperacillin/tazobactam IVPB. 3.375 Gram(s) IV Intermittent every 8 hours  potassium chloride  10 mEq/100 mL IVPB 10 milliEquivalent(s) IV Intermittent every 1 hour    MEDICATIONS  (PRN):  benzocaine 15 mG/menthol 3.6 mG Lozenge 1 Lozenge Oral four times a day PRN Sore Throat  diphenhydrAMINE   Injectable 12.5 milliGRAM(s) IV Push every 4 hours PRN Pruritus  HYDROmorphone  Injectable 0.5 milliGRAM(s) IV Push every 3 hours PRN for breakthrough pain while on epidural infusion  HYDROmorphone (10 MICROgram(s)/mL) + BUpivacaine 0.0625% in 0.9% Sodium Chloride PCEA Rescue Clinician  Bolus 5 milliLiter(s) Epidural every 15 minutes PRN for Pain Score greater than 6  nalbuphine Injectable 2.5 milliGRAM(s) IV Push every 6 hours PRN Pruritus  naloxone Injectable 0.1 milliGRAM(s) IV Push every 3 minutes PRN For ANY of the following changes in patient status:  A. RR LESS THAN 10 breaths per minute, B. Oxygen saturation LESS THAN 90%, C. Sedation score of 6  ondansetron Injectable 4 milliGRAM(s) IV Push every 6 hours PRN Nausea      OBJECTIVE:   [X] No new signs     [ ] Other:    Side Effects:  [X ] None			[ ] Other:    Assessment of Catheter Site:		[ X] Intact		[ ] Other:    ASSESSMENT/PLAN  [ X] Continue current therapy    [ ] Therapy changed to:    [ ] IV PCA       [ ] Epidural     [ ] prn Analgesics     Comments: Continue current PCEA settings.

## 2019-04-29 NOTE — PROGRESS NOTE ADULT - PROBLEM SELECTOR PLAN 1
- plan per general surgery  - routine post-op care  - will continue to follow clinical course    Shaka James MD PGY2

## 2019-04-29 NOTE — PROGRESS NOTE ADULT - ASSESSMENT
62y/o POD#3 from Ex-lap, LAR with re-anastomosis drainage of abscess cavity, excision of chronic IR drain, ARELIS, BSO for perforated diverticulitis and fibroid uterus in stable condition.

## 2019-04-29 NOTE — PROGRESS NOTE ADULT - ASSESSMENT
Kell Washington is a 63 y.o. woman with history of HTN, HLD, uterine fibroid s/p myomectomy and perforated diverticulitis who was initially scheduled for hysterectomy, bilateral salpingo-oophorectomy, LAR and enterocutaneous fistula on 4/5, which was delayed for fever who presented to the ED on 4/22 for increased drainage from her drain. s/p open LAR, excision of IR drain tract, and ARELIS BSO 4/26.     Plan:  - Pain control - PCEA  - f/u AM labs, monitor coags  - Daniel  - CLD  - f/u GI function  - Abx: Zosyn, ends 5/1  - Dvt ppx: HepSQ    [facility]  [id number]

## 2019-04-29 NOTE — PROGRESS NOTE ADULT - SUBJECTIVE AND OBJECTIVE BOX
Anesthesia Pain Management Service: Day _4_ of Epidural    SUBJECTIVE: Patient doing well with PCEA and no problems.  Pain Scale Score:   Refer to charted pain scores    THERAPY:  [x ] Epidural Bupivacaine 0.0625% and Hydromorphone  		[ X] 10 micrograms/mL	[ ] 5 micrograms/mL  [ ] Epidural Bupivacaine 0.0625% and Fentanyl - 2 micrograms/mL  [ ] Epidural Ropivacaine 0.1% plain – 1 mg/mL  [ ] Patient Controlled Regional Anesthesia (PCRA) Ropivacaine  		[ ] 0.2%			[ ] 0.1%    Demand dose __3_ lockout __15_ (minutes) Continuous Rate __4_ Total: __91.9__ ml used (in past 24 hours)      MEDICATIONS  (STANDING):  atorvastatin 20 milliGRAM(s) Oral at bedtime  dextrose 5% + sodium chloride 0.9% 1000 milliLiter(s) (75 mL/Hr) IV Continuous <Continuous>  heparin  Injectable 5000 Unit(s) SubCutaneous every 8 hours  HYDROmorphone (10 MICROgram(s)/mL) + BUpivacaine 0.0625% in 0.9% Sodium Chloride PCEA 250 milliLiter(s) Epidural PCA Continuous  labetalol 100 milliGRAM(s) Oral two times a day  lisinopril 10 milliGRAM(s) Oral daily  magnesium sulfate  IVPB 1 Gram(s) IV Intermittent once  piperacillin/tazobactam IVPB. 3.375 Gram(s) IV Intermittent every 8 hours  potassium chloride  10 mEq/100 mL IVPB 10 milliEquivalent(s) IV Intermittent every 1 hour    MEDICATIONS  (PRN):  benzocaine 15 mG/menthol 3.6 mG Lozenge 1 Lozenge Oral four times a day PRN Sore Throat  diphenhydrAMINE   Injectable 12.5 milliGRAM(s) IV Push every 4 hours PRN Pruritus  HYDROmorphone  Injectable 0.5 milliGRAM(s) IV Push every 3 hours PRN for breakthrough pain while on epidural infusion  HYDROmorphone (10 MICROgram(s)/mL) + BUpivacaine 0.0625% in 0.9% Sodium Chloride PCEA Rescue Clinician  Bolus 5 milliLiter(s) Epidural every 15 minutes PRN for Pain Score greater than 6  nalbuphine Injectable 2.5 milliGRAM(s) IV Push every 6 hours PRN Pruritus  naloxone Injectable 0.1 milliGRAM(s) IV Push every 3 minutes PRN For ANY of the following changes in patient status:  A. RR LESS THAN 10 breaths per minute, B. Oxygen saturation LESS THAN 90%, C. Sedation score of 6  ondansetron Injectable 4 milliGRAM(s) IV Push every 6 hours PRN Nausea      OBJECTIVE:    Assessment of Catheter Site:	[ ] Left	[ ] Right  [x ] Epidural 	[ ] Femoral	      [ ] Saphenous   [ ] Supraclavicular   [ ] Other:    [x ] Dressing intact	[x ] Site non-tender	[ x] Site without erythema, discharge, edema  [x ] Epidural tubing and connection checked	[x] Gross neurological exam within normal limits  [ ] Catheter removed – tip intact		[ ] Afebrile  	[ ] Febrile: ___   [ X] see Temp under VS below)    PT/INR - ( 29 Apr 2019 05:15 )   PT: 14.0 SEC;   INR: 1.22          PTT - ( 29 Apr 2019 05:15 )  PTT:28.6 SEC                      8.4    10.95 )-----------( 342      ( 29 Apr 2019 05:15 )             28.2     Vital Signs Last 24 Hrs  T(C): 36.8 (04-29-19 @ 06:07), Max: 37.4 (04-28-19 @ 18:00)  T(F): 98.3 (04-29-19 @ 06:07), Max: 99.4 (04-28-19 @ 18:00)  HR: 88 (04-29-19 @ 06:07) (81 - 94)  BP: 110/58 (04-29-19 @ 06:07) (110/58 - 140/75)  BP(mean): --  RR: 16 (04-29-19 @ 06:07) (16 - 18)  SpO2: 98% (04-29-19 @ 06:07) (94% - 98%)      Sedation Score:	[x ] Alert	[ ] Drowsy	[ ] Arousable	[ ] Asleep	[ ] Unresponsive    Side Effects:	[x ] None	[ ] Nausea	[ ] Vomiting	[ ] Pruritus  		[ ] Weakness		[ ] Numbness	[ ] Other:    ASSESSMENT/ PLAN:    Therapy to  be:	[x ] Continue   [ ] Discontinued   [ ] Change to prn Analgesics    Documentation and Verification of current medications:  [ X ] Done	[ ] Not done, not eligible, reason:    Comments: patient has not yet had a bowel movement or passed gas. will continue PCEA for now.

## 2019-04-29 NOTE — PROGRESS NOTE ADULT - SUBJECTIVE AND OBJECTIVE BOX
GENERAL SURGERY DAILY PROGRESS NOTE:     Subjective:  Pt seen and examined. No acute events overnight. Pain well controlled. Denies n/v. Denies flatus or BM. Tolerating clears.         Objective:  Vital Signs Last 24 Hrs  T(C): 36.8 (29 Apr 2019 10:30), Max: 37.4 (28 Apr 2019 18:00)  T(F): 98.2 (29 Apr 2019 10:30), Max: 99.4 (28 Apr 2019 18:00)  HR: 76 (29 Apr 2019 10:30) (76 - 94)  BP: 137/70 (29 Apr 2019 10:30) (110/58 - 140/75)  BP(mean): --  RR: 18 (29 Apr 2019 10:30) (16 - 18)  SpO2: 93% (29 Apr 2019 10:30) (93% - 98%)    I&O's Detail    28 Apr 2019 07:01  -  29 Apr 2019 07:00  --------------------------------------------------------  IN:    dextrose 5% + sodium chloride 0.9%: 900 mL  Total IN: 900 mL    OUT:    Indwelling Catheter - Urethral: 1350 mL  Total OUT: 1350 mL    Total NET: -450 mL      29 Apr 2019 07:01  -  29 Apr 2019 10:39  --------------------------------------------------------  IN:  Total IN: 0 mL    OUT:    Indwelling Catheter - Urethral: 100 mL  Total OUT: 100 mL    Total NET: -100 mL          MEDICATIONS  (STANDING):  atorvastatin 20 milliGRAM(s) Oral at bedtime  dextrose 5% + sodium chloride 0.9% 1000 milliLiter(s) (75 mL/Hr) IV Continuous <Continuous>  heparin  Injectable 5000 Unit(s) SubCutaneous every 8 hours  HYDROmorphone (10 MICROgram(s)/mL) + BUpivacaine 0.0625% in 0.9% Sodium Chloride PCEA 250 milliLiter(s) Epidural PCA Continuous  labetalol 100 milliGRAM(s) Oral two times a day  lisinopril 10 milliGRAM(s) Oral daily  piperacillin/tazobactam IVPB. 3.375 Gram(s) IV Intermittent every 8 hours  potassium chloride  10 mEq/100 mL IVPB 10 milliEquivalent(s) IV Intermittent every 1 hour    MEDICATIONS  (PRN):  benzocaine 15 mG/menthol 3.6 mG Lozenge 1 Lozenge Oral four times a day PRN Sore Throat  diphenhydrAMINE   Injectable 12.5 milliGRAM(s) IV Push every 4 hours PRN Pruritus  HYDROmorphone  Injectable 0.5 milliGRAM(s) IV Push every 3 hours PRN for breakthrough pain while on epidural infusion  HYDROmorphone (10 MICROgram(s)/mL) + BUpivacaine 0.0625% in 0.9% Sodium Chloride PCEA Rescue Clinician  Bolus 5 milliLiter(s) Epidural every 15 minutes PRN for Pain Score greater than 6  nalbuphine Injectable 2.5 milliGRAM(s) IV Push every 6 hours PRN Pruritus  naloxone Injectable 0.1 milliGRAM(s) IV Push every 3 minutes PRN For ANY of the following changes in patient status:  A. RR LESS THAN 10 breaths per minute, B. Oxygen saturation LESS THAN 90%, C. Sedation score of 6  ondansetron Injectable 4 milliGRAM(s) IV Push every 6 hours PRN Nausea                            8.4    10.95 )-----------( 342      ( 29 Apr 2019 05:15 )             28.2       04-29    134<L>  |  96<L>  |  5<L>  ----------------------------<  118<H>  3.6   |  27  |  0.72    Ca    8.6      29 Apr 2019 05:15  Phos  3.0     04-29  Mg     1.9     04-29          PHYSICAL EXAM  NAD, awake and alert  Respirations nonlabored  Abdomen soft, appropriately tender, nondistended, incision/dressing c/d/i  No guarding or rebound tenderness  Daniel in place with clear urine

## 2019-04-29 NOTE — PROGRESS NOTE ADULT - SUBJECTIVE AND OBJECTIVE BOX
Anesthesia Pain Management Service: Day _4_ of Epidural    SUBJECTIVE: Patient doing well with PCEA and no problems.  Pain Scale Score:   Refer to charted pain scores    THERAPY:  [x ] Epidural Bupivacaine 0.0625% and Hydromorphone  		[X ] 10 micrograms/mL	[ ] 5 micrograms/mL  [ ] Epidural Bupivacaine 0.0625% and Fentanyl - 2 micrograms/mL  [ ] Epidural Ropivacaine 0.1% plain – 1 mg/mL  [ ] Patient Controlled Regional Anesthesia (PCRA) Ropivacaine  		[ ] 0.2%			[ ] 0.1%    Demand dose __3_ lockout __15_ (minutes) Continuous Rate _4__ Total: _22ml__ Daily      MEDICATIONS  (STANDING):  atorvastatin 20 milliGRAM(s) Oral at bedtime  dextrose 5% + sodium chloride 0.9% 1000 milliLiter(s) (75 mL/Hr) IV Continuous <Continuous>  heparin  Injectable 5000 Unit(s) SubCutaneous every 8 hours  labetalol 100 milliGRAM(s) Oral two times a day  lisinopril 10 milliGRAM(s) Oral daily  piperacillin/tazobactam IVPB. 3.375 Gram(s) IV Intermittent every 8 hours  potassium chloride  10 mEq/100 mL IVPB 10 milliEquivalent(s) IV Intermittent every 1 hour    MEDICATIONS  (PRN):  benzocaine 15 mG/menthol 3.6 mG Lozenge 1 Lozenge Oral four times a day PRN Sore Throat  HYDROmorphone  Injectable 0.5 milliGRAM(s) IV Push every 3 hours PRN Severe Pain (7 - 10)  HYDROmorphone  Injectable 0.25 milliGRAM(s) IV Push every 3 hours PRN Moderate Pain (4 - 6)      OBJECTIVE: sitting in chair     Assessment of Catheter Site:	[ ] Left	[ ] Right  [x ] Epidural 	[ ] Femoral	      [ ] Saphenous   [ ] Supraclavicular   [ ] Other:    [x ] Dressing intact	[x ] Site non-tender	[ x] Site without erythema, discharge, edema  [x ] Epidural tubing and connection checked	[x] Gross neurological exam within normal limits  [X ] Catheter removed – tip intact		[ ] Afebrile	  [ ] Febrile: ___       [ X] see Temp under VS below)    PT/INR - ( 29 Apr 2019 05:15 )   PT: 14.0 SEC;   INR: 1.22          PTT - ( 29 Apr 2019 05:15 )  PTT:28.6 SEC                      8.4    10.95 )-----------( 342      ( 29 Apr 2019 05:15 )             28.2     Vital Signs Last 24 Hrs  T(C): 36.8 (04-29-19 @ 10:30), Max: 37.4 (04-28-19 @ 18:00)  T(F): 98.2 (04-29-19 @ 10:30), Max: 99.4 (04-28-19 @ 18:00)  HR: 76 (04-29-19 @ 10:30) (76 - 94)  BP: 137/70 (04-29-19 @ 10:30) (110/58 - 140/75)  BP(mean): --  RR: 18 (04-29-19 @ 10:30) (16 - 18)  SpO2: 93% (04-29-19 @ 10:30) (93% - 98%)      Sedation Score:	[x ] Alert	[ ] Drowsy	[ ] Arousable	[ ] Asleep	[ ] Unresponsive    Side Effects:	[x ] None	[ ] Nausea	[ ] Vomiting	[ ] Pruritus  		[ ] Weakness		[ ] Numbness	[ ] Other:    ASSESSMENT/ PLAN:    Therapy to  be:	[ ] Continue   [ X] Discontinued   [ X] Change to prn Analgesics    Documentation and Verification of current medications:  [ X ] Done	[ ] Not done, not eligible, reason:    Comments: Pt. requested to team that she wanted epidural to be removed so she can move around more freely, after lengthy discussion about the benefits of keeping epidural in place pt. still wanted it removed.  D/C epidural and informed pt. to ask RN for pain medication when needed. Discussed with primary team. Changed to IV or oral opioid medication at this point.

## 2019-04-29 NOTE — PROGRESS NOTE ADULT - SUBJECTIVE AND OBJECTIVE BOX
R2 Note    HD#4  POD#3    INTERVAL HPI/OVERNIGHT EVENTS: Pt seen and examined at bedside.  Pt without complaints this morning.  Minimally ambulating, not yet passing flatus, tolerating clears, pain controlled with analgesia, urinating spontaneously  She denies nausea/vomiting/fever/chills/chest pain/SOB/dizziness.    MEDICATIONS  (STANDING):  atorvastatin 20 milliGRAM(s) Oral at bedtime  dextrose 5% + sodium chloride 0.9%. 1000 milliLiter(s) (75 mL/Hr) IV Continuous <Continuous>  heparin  Injectable 5000 Unit(s) SubCutaneous every 8 hours  HYDROmorphone (10 MICROgram(s)/mL) + BUpivacaine 0.0625% in 0.9% Sodium Chloride PCEA 250 milliLiter(s) Epidural PCA Continuous  labetalol 100 milliGRAM(s) Oral two times a day  lisinopril 10 milliGRAM(s) Oral daily  piperacillin/tazobactam IVPB. 3.375 Gram(s) IV Intermittent every 8 hours    MEDICATIONS  (PRN):  benzocaine 15 mG/menthol 3.6 mG Lozenge 1 Lozenge Oral four times a day PRN Sore Throat  diphenhydrAMINE   Injectable 12.5 milliGRAM(s) IV Push every 4 hours PRN Pruritus  HYDROmorphone  Injectable 0.5 milliGRAM(s) IV Push every 3 hours PRN for breakthrough pain while on epidural infusion  HYDROmorphone (10 MICROgram(s)/mL) + BUpivacaine 0.0625% in 0.9% Sodium Chloride PCEA Rescue Clinician  Bolus 5 milliLiter(s) Epidural every 15 minutes PRN for Pain Score greater than 6  nalbuphine Injectable 2.5 milliGRAM(s) IV Push every 6 hours PRN Pruritus  naloxone Injectable 0.1 milliGRAM(s) IV Push every 3 minutes PRN For ANY of the following changes in patient status:  A. RR LESS THAN 10 breaths per minute, B. Oxygen saturation LESS THAN 90%, C. Sedation score of 6  ondansetron Injectable 4 milliGRAM(s) IV Push every 6 hours PRN Nausea      12 point ROS negative except as outlined above    Vital Signs Last 24 Hrs  T(C): 36.8 (29 Apr 2019 06:07), Max: 37.4 (28 Apr 2019 18:00)  T(F): 98.3 (29 Apr 2019 06:07), Max: 99.4 (28 Apr 2019 18:00)  HR: 88 (29 Apr 2019 06:07) (81 - 94)  BP: 110/58 (29 Apr 2019 06:07) (110/58 - 140/75)  BP(mean): --  RR: 16 (29 Apr 2019 06:07) (16 - 18)  SpO2: 98% (29 Apr 2019 06:07) (94% - 98%)    I&O's Summary    27 Apr 2019 07:01  -  28 Apr 2019 07:00  --------------------------------------------------------  IN: 2140 mL / OUT: 975 mL / NET: 1165 mL    28 Apr 2019 07:01  -  29 Apr 2019 06:48  --------------------------------------------------------  IN: 900 mL / OUT: 1200 mL / NET: -300 mL          PHYSICAL EXAM:    General: NAD  CV: RRR  Lungs: CTAB  Abdomen: Soft, appropirately-tender, non-distended, +BS  Incision: midline vertical incision, C/D/I, dressing in place  : drew in situ, meza urine in drew, UOP adequate  Ext: No pain or swelling, SCDs in place  Lines:      LABS:                          8.0    15.06 )-----------( 349      ( 28 Apr 2019 05:12 )             26.5   baso x      eos x      imm gran x      lymph x      mono x      poly x                            8.7    12.05 )-----------( 366      ( 27 Apr 2019 06:00 )             28.6   baso x      eos x      imm gran x      lymph x      mono x      poly x                            9.5    15.57 )-----------( 463      ( 26 Apr 2019 14:28 )             32.0   baso x      eos x      imm gran x      lymph x      mono x      poly x            PT/INR - ( 28 Apr 2019 05:12 )   PT: 14.8 SEC;   INR: 1.32          PTT - ( 28 Apr 2019 05:12 )  PTT:30.5 SEC          RADIOLOGY & ADDITIONAL TESTS:

## 2019-04-30 LAB
ANION GAP SERPL CALC-SCNC: 11 MMO/L — SIGNIFICANT CHANGE UP (ref 7–14)
BUN SERPL-MCNC: 4 MG/DL — LOW (ref 7–23)
CALCIUM SERPL-MCNC: 8.5 MG/DL — SIGNIFICANT CHANGE UP (ref 8.4–10.5)
CHLORIDE SERPL-SCNC: 101 MMOL/L — SIGNIFICANT CHANGE UP (ref 98–107)
CO2 SERPL-SCNC: 26 MMOL/L — SIGNIFICANT CHANGE UP (ref 22–31)
CREAT SERPL-MCNC: 0.72 MG/DL — SIGNIFICANT CHANGE UP (ref 0.5–1.3)
GLUCOSE SERPL-MCNC: 144 MG/DL — HIGH (ref 70–99)
HCT VFR BLD CALC: 24.6 % — LOW (ref 34.5–45)
HGB BLD-MCNC: 7.5 G/DL — LOW (ref 11.5–15.5)
MAGNESIUM SERPL-MCNC: 1.8 MG/DL — SIGNIFICANT CHANGE UP (ref 1.6–2.6)
MCHC RBC-ENTMCNC: 25.5 PG — LOW (ref 27–34)
MCHC RBC-ENTMCNC: 30.5 % — LOW (ref 32–36)
MCV RBC AUTO: 83.7 FL — SIGNIFICANT CHANGE UP (ref 80–100)
NRBC # FLD: 0.07 K/UL — SIGNIFICANT CHANGE UP (ref 0–0)
PHOSPHATE SERPL-MCNC: 2.4 MG/DL — LOW (ref 2.5–4.5)
PLATELET # BLD AUTO: 369 K/UL — SIGNIFICANT CHANGE UP (ref 150–400)
PMV BLD: 9 FL — SIGNIFICANT CHANGE UP (ref 7–13)
POTASSIUM SERPL-MCNC: 3.9 MMOL/L — SIGNIFICANT CHANGE UP (ref 3.5–5.3)
POTASSIUM SERPL-SCNC: 3.9 MMOL/L — SIGNIFICANT CHANGE UP (ref 3.5–5.3)
RBC # BLD: 2.94 M/UL — LOW (ref 3.8–5.2)
RBC # FLD: 14.9 % — HIGH (ref 10.3–14.5)
SODIUM SERPL-SCNC: 138 MMOL/L — SIGNIFICANT CHANGE UP (ref 135–145)
WBC # BLD: 11.36 K/UL — HIGH (ref 3.8–10.5)
WBC # FLD AUTO: 11.36 K/UL — HIGH (ref 3.8–10.5)

## 2019-04-30 PROCEDURE — 71045 X-RAY EXAM CHEST 1 VIEW: CPT | Mod: 26

## 2019-04-30 RX ORDER — ONDANSETRON 8 MG/1
4 TABLET, FILM COATED ORAL ONCE
Qty: 0 | Refills: 0 | Status: DISCONTINUED | OUTPATIENT
Start: 2019-04-30 | End: 2019-04-30

## 2019-04-30 RX ORDER — POTASSIUM PHOSPHATE, MONOBASIC POTASSIUM PHOSPHATE, DIBASIC 236; 224 MG/ML; MG/ML
15 INJECTION, SOLUTION INTRAVENOUS ONCE
Qty: 0 | Refills: 0 | Status: COMPLETED | OUTPATIENT
Start: 2019-04-30 | End: 2019-04-30

## 2019-04-30 RX ORDER — MAGNESIUM SULFATE 500 MG/ML
2 VIAL (ML) INJECTION ONCE
Qty: 0 | Refills: 0 | Status: COMPLETED | OUTPATIENT
Start: 2019-04-30 | End: 2019-04-30

## 2019-04-30 RX ADMIN — PIPERACILLIN AND TAZOBACTAM 25 GRAM(S): 4; .5 INJECTION, POWDER, LYOPHILIZED, FOR SOLUTION INTRAVENOUS at 14:13

## 2019-04-30 RX ADMIN — PIPERACILLIN AND TAZOBACTAM 25 GRAM(S): 4; .5 INJECTION, POWDER, LYOPHILIZED, FOR SOLUTION INTRAVENOUS at 22:54

## 2019-04-30 RX ADMIN — POTASSIUM PHOSPHATE, MONOBASIC POTASSIUM PHOSPHATE, DIBASIC 62.5 MILLIMOLE(S): 236; 224 INJECTION, SOLUTION INTRAVENOUS at 18:23

## 2019-04-30 RX ADMIN — SODIUM CHLORIDE 100 MILLILITER(S): 9 INJECTION, SOLUTION INTRAVENOUS at 11:00

## 2019-04-30 RX ADMIN — HEPARIN SODIUM 5000 UNIT(S): 5000 INJECTION INTRAVENOUS; SUBCUTANEOUS at 22:54

## 2019-04-30 RX ADMIN — HEPARIN SODIUM 5000 UNIT(S): 5000 INJECTION INTRAVENOUS; SUBCUTANEOUS at 05:30

## 2019-04-30 RX ADMIN — HEPARIN SODIUM 5000 UNIT(S): 5000 INJECTION INTRAVENOUS; SUBCUTANEOUS at 14:13

## 2019-04-30 RX ADMIN — ONDANSETRON 4 MILLIGRAM(S): 8 TABLET, FILM COATED ORAL at 05:29

## 2019-04-30 RX ADMIN — PIPERACILLIN AND TAZOBACTAM 25 GRAM(S): 4; .5 INJECTION, POWDER, LYOPHILIZED, FOR SOLUTION INTRAVENOUS at 05:30

## 2019-04-30 RX ADMIN — LISINOPRIL 10 MILLIGRAM(S): 2.5 TABLET ORAL at 05:30

## 2019-04-30 RX ADMIN — Medication 100 MILLIGRAM(S): at 05:30

## 2019-04-30 RX ADMIN — Medication 50 GRAM(S): at 11:00

## 2019-04-30 NOTE — PROGRESS NOTE ADULT - ASSESSMENT
62y/o POD#4 from Ex-lap, LAR with re-anastomosis drainage of abscess cavity, excision of chronic IR drain, ARELIS, BSO for perforated diverticulitis and fibroid uterus in stable condition.

## 2019-04-30 NOTE — PROGRESS NOTE ADULT - SUBJECTIVE AND OBJECTIVE BOX
GYN ONC   HD#5, POD#4    Pt seen and examined at bedside. Reports intermittent nausea since yesterday with a few small episodes of emesis.  Pain well controlled on current regimen.   She denies SOB/CP/palpitations, fever/chills, nausea/emesis. She is ambulating and voiding spontaneously. She remains on clears and is not passing flatus.     MEDICATIONS  (STANDING):  atorvastatin 20 milliGRAM(s) Oral at bedtime  dextrose 5% + sodium chloride 0.9% 1000 milliLiter(s) (100 mL/Hr) IV Continuous <Continuous>  heparin  Injectable 5000 Unit(s) SubCutaneous every 8 hours  labetalol 100 milliGRAM(s) Oral two times a day  lisinopril 10 milliGRAM(s) Oral daily  piperacillin/tazobactam IVPB. 3.375 Gram(s) IV Intermittent every 8 hours    MEDICATIONS  (PRN):  benzocaine 15 mG/menthol 3.6 mG Lozenge 1 Lozenge Oral four times a day PRN Sore Throat  HYDROmorphone  Injectable 0.5 milliGRAM(s) IV Push every 3 hours PRN Severe Pain (7 - 10)  HYDROmorphone  Injectable 0.25 milliGRAM(s) IV Push every 3 hours PRN Moderate Pain (4 - 6)    Vital Signs Last 24 Hrs  T(C): 37.3 (30 Apr 2019 05:28), Max: 37.4 (30 Apr 2019 02:25)  T(F): 99.2 (30 Apr 2019 05:28), Max: 99.4 (30 Apr 2019 02:25)  HR: 97 (30 Apr 2019 05:28) (76 - 108)  BP: 132/68 (30 Apr 2019 05:28) (132/68 - 150/81)  BP(mean): --  RR: 17 (30 Apr 2019 05:28) (16 - 18)  SpO2: 93% (30 Apr 2019 05:28) (92% - 95%)    04-28 @ 07:01  -  04-29 @ 07:00  --------------------------------------------------------  IN: 900 mL / OUT: 1350 mL / NET: -450 mL    04-29 @ 07:01  -  04-30 @ 06:40  --------------------------------------------------------  IN: 3160 mL / OUT: 1425 mL / NET: 1735 mL      PHYSICAL EXAM:    Gen: NAD, A+0 x 3  CV: RRR  Pulm: CTA BL  Abd: Soft, appropriately tender, non distended, no rebound or guarding  Incision: midline vertical with dressing intact, clean, dry;  Extremities: No swelling or calf tenderness, SCDs in place    Labs, additional tests:             8.4    10.95 )-----------( 342      ( 04-29 @ 05:15 )             28.2                8.0    15.06 )-----------( 349      ( 04-28 @ 05:12 )             26.5       04-29    134<L>  |  96<L>  |  5<L>  ----------------------------<  118<H>  3.6   |  27  |  0.72    Ca    8.6      29 Apr 2019 05:15  Phos  3.0     04-29  Mg     1.9     04-29 GYN ONC   HD#5, POD#4    Pt seen and examined at bedside. Reports intermittent nausea since yesterday with an episode of emesis.  Pain well controlled on current regimen.   She denies SOB/CP/palpitations, fever/chills, nausea/emesis. She is ambulating and voiding spontaneously. She remains on clears and is not passing flatus.     MEDICATIONS  (STANDING):  atorvastatin 20 milliGRAM(s) Oral at bedtime  dextrose 5% + sodium chloride 0.9% 1000 milliLiter(s) (100 mL/Hr) IV Continuous <Continuous>  heparin  Injectable 5000 Unit(s) SubCutaneous every 8 hours  labetalol 100 milliGRAM(s) Oral two times a day  lisinopril 10 milliGRAM(s) Oral daily  piperacillin/tazobactam IVPB. 3.375 Gram(s) IV Intermittent every 8 hours    MEDICATIONS  (PRN):  benzocaine 15 mG/menthol 3.6 mG Lozenge 1 Lozenge Oral four times a day PRN Sore Throat  HYDROmorphone  Injectable 0.5 milliGRAM(s) IV Push every 3 hours PRN Severe Pain (7 - 10)  HYDROmorphone  Injectable 0.25 milliGRAM(s) IV Push every 3 hours PRN Moderate Pain (4 - 6)    Vital Signs Last 24 Hrs  T(C): 37.3 (30 Apr 2019 05:28), Max: 37.4 (30 Apr 2019 02:25)  T(F): 99.2 (30 Apr 2019 05:28), Max: 99.4 (30 Apr 2019 02:25)  HR: 97 (30 Apr 2019 05:28) (76 - 108)  BP: 132/68 (30 Apr 2019 05:28) (132/68 - 150/81)  BP(mean): --  RR: 17 (30 Apr 2019 05:28) (16 - 18)  SpO2: 93% (30 Apr 2019 05:28) (92% - 95%)    04-28 @ 07:01  -  04-29 @ 07:00  --------------------------------------------------------  IN: 900 mL / OUT: 1350 mL / NET: -450 mL    04-29 @ 07:01  -  04-30 @ 06:40  --------------------------------------------------------  IN: 3160 mL / OUT: 1425 mL / NET: 1735 mL      PHYSICAL EXAM:    Gen: NAD, A+0 x 3  CV: RRR  Pulm: CTA BL  Abd: Soft, appropriately tender, non distended, no rebound or guarding  Incision: midline vertical with dressing intact, clean, dry;  Extremities: No swelling or calf tenderness, SCDs in place    Labs, additional tests:             8.4    10.95 )-----------( 342      ( 04-29 @ 05:15 )             28.2                8.0    15.06 )-----------( 349      ( 04-28 @ 05:12 )             26.5       04-29    134<L>  |  96<L>  |  5<L>  ----------------------------<  118<H>  3.6   |  27  |  0.72    Ca    8.6      29 Apr 2019 05:15  Phos  3.0     04-29  Mg     1.9     04-29

## 2019-04-30 NOTE — PROGRESS NOTE ADULT - PROBLEM SELECTOR PLAN 1
- Plan as per general surgery, appreciate their care   - Will continue to follow postoperative course    PRIYA Huff, PGY-2

## 2019-04-30 NOTE — PROGRESS NOTE ADULT - SUBJECTIVE AND OBJECTIVE BOX
GENERAL SURGERY DAILY PROGRESS NOTE:     Subjective:  Pt seen and examined. Had episode of nausea and bilious emesis overnight. Pain well controlled. Denies n/v. Denies flatus or BM.       Objective:  Vital Signs Last 24 Hrs  T(C): 37.3 (30 Apr 2019 05:28), Max: 37.4 (30 Apr 2019 02:25)  T(F): 99.2 (30 Apr 2019 05:28), Max: 99.4 (30 Apr 2019 02:25)  HR: 97 (30 Apr 2019 05:28) (76 - 108)  BP: 132/68 (30 Apr 2019 05:28) (132/68 - 150/81)  BP(mean): --  RR: 17 (30 Apr 2019 05:28) (16 - 18)  SpO2: 93% (30 Apr 2019 05:28) (92% - 95%)    I&O's Detail    29 Apr 2019 07:01  -  30 Apr 2019 07:00  --------------------------------------------------------  IN:    dextrose 5% + sodium chloride 0.9%: 2100 mL    IV PiggyBack: 550 mL    Oral Fluid: 510 mL  Total IN: 3160 mL    OUT:    Indwelling Catheter - Urethral: 1125 mL    Voided: 300 mL  Total OUT: 1425 mL    Total NET: 1735 mL          MEDICATIONS  (STANDING):  atorvastatin 20 milliGRAM(s) Oral at bedtime  dextrose 5% + sodium chloride 0.9% 1000 milliLiter(s) (100 mL/Hr) IV Continuous <Continuous>  heparin  Injectable 5000 Unit(s) SubCutaneous every 8 hours  labetalol 100 milliGRAM(s) Oral two times a day  lisinopril 10 milliGRAM(s) Oral daily  magnesium sulfate  IVPB 2 Gram(s) IV Intermittent once  piperacillin/tazobactam IVPB. 3.375 Gram(s) IV Intermittent every 8 hours  potassium phosphate IVPB 15 milliMole(s) IV Intermittent once    MEDICATIONS  (PRN):  benzocaine 15 mG/menthol 3.6 mG Lozenge 1 Lozenge Oral four times a day PRN Sore Throat  HYDROmorphone  Injectable 0.5 milliGRAM(s) IV Push every 3 hours PRN Severe Pain (7 - 10)  HYDROmorphone  Injectable 0.25 milliGRAM(s) IV Push every 3 hours PRN Moderate Pain (4 - 6)                            7.5    11.36 )-----------( 369      ( 30 Apr 2019 06:50 )             24.6       04-30    138  |  101  |  4<L>  ----------------------------<  144<H>  3.9   |  26  |  0.72    Ca    8.5      30 Apr 2019 06:50  Phos  2.4     04-30  Mg     1.8     04-30          PHYSICAL EXAM  NAD, awake and alert  Respirations nonlabored  Abdomen soft, appropriately tender, nondistended, incision/dressing c/d/i  No guarding or rebound tenderness  Daniel in place with clear urine

## 2019-04-30 NOTE — PROGRESS NOTE ADULT - ASSESSMENT
Kell Washington is a 63 y.o. woman with history of HTN, HLD, uterine fibroid s/p myomectomy and perforated diverticulitis who was initially scheduled for hysterectomy, bilateral salpingo-oophorectomy, LAR and enterocutaneous fistula on 4/5, which was delayed for fever who presented to the ED on 4/22 for increased drainage from her drain. s/p open LAR, excision of IR drain tract, and ARELIS BSO 4/26.     Plan:  - Abdominal Xray   - Will consider NGT if emesis/nausea does not resolve   - Pain control - PRN Dilaudid  - f/u AM labs  - CLD  - f/u GI function  - Abx: Zosyn, ends 5/1  - Dvt ppx: HepSQ    A Surgery  [id number]

## 2019-05-01 LAB
ANION GAP SERPL CALC-SCNC: 13 MMO/L — SIGNIFICANT CHANGE UP (ref 7–14)
BASOPHILS # BLD AUTO: 0.06 K/UL — SIGNIFICANT CHANGE UP (ref 0–0.2)
BASOPHILS NFR BLD AUTO: 0.7 % — SIGNIFICANT CHANGE UP (ref 0–2)
BUN SERPL-MCNC: 3 MG/DL — LOW (ref 7–23)
CALCIUM SERPL-MCNC: 8.8 MG/DL — SIGNIFICANT CHANGE UP (ref 8.4–10.5)
CHLORIDE SERPL-SCNC: 102 MMOL/L — SIGNIFICANT CHANGE UP (ref 98–107)
CO2 SERPL-SCNC: 28 MMOL/L — SIGNIFICANT CHANGE UP (ref 22–31)
CREAT SERPL-MCNC: 0.77 MG/DL — SIGNIFICANT CHANGE UP (ref 0.5–1.3)
EOSINOPHIL # BLD AUTO: 0.32 K/UL — SIGNIFICANT CHANGE UP (ref 0–0.5)
EOSINOPHIL NFR BLD AUTO: 3.9 % — SIGNIFICANT CHANGE UP (ref 0–6)
GLUCOSE SERPL-MCNC: 129 MG/DL — HIGH (ref 70–99)
HCT VFR BLD CALC: 25.5 % — LOW (ref 34.5–45)
HGB BLD-MCNC: 7.7 G/DL — LOW (ref 11.5–15.5)
IMM GRANULOCYTES NFR BLD AUTO: 0.6 % — SIGNIFICANT CHANGE UP (ref 0–1.5)
LYMPHOCYTES # BLD AUTO: 1.4 K/UL — SIGNIFICANT CHANGE UP (ref 1–3.3)
LYMPHOCYTES # BLD AUTO: 17 % — SIGNIFICANT CHANGE UP (ref 13–44)
MAGNESIUM SERPL-MCNC: 1.9 MG/DL — SIGNIFICANT CHANGE UP (ref 1.6–2.6)
MCHC RBC-ENTMCNC: 25.1 PG — LOW (ref 27–34)
MCHC RBC-ENTMCNC: 30.2 % — LOW (ref 32–36)
MCV RBC AUTO: 83.1 FL — SIGNIFICANT CHANGE UP (ref 80–100)
MONOCYTES # BLD AUTO: 0.86 K/UL — SIGNIFICANT CHANGE UP (ref 0–0.9)
MONOCYTES NFR BLD AUTO: 10.4 % — SIGNIFICANT CHANGE UP (ref 2–14)
NEUTROPHILS # BLD AUTO: 5.55 K/UL — SIGNIFICANT CHANGE UP (ref 1.8–7.4)
NEUTROPHILS NFR BLD AUTO: 67.4 % — SIGNIFICANT CHANGE UP (ref 43–77)
NRBC # FLD: 0.02 K/UL — SIGNIFICANT CHANGE UP (ref 0–0)
PHOSPHATE SERPL-MCNC: 3 MG/DL — SIGNIFICANT CHANGE UP (ref 2.5–4.5)
PLATELET # BLD AUTO: 354 K/UL — SIGNIFICANT CHANGE UP (ref 150–400)
PMV BLD: 8.8 FL — SIGNIFICANT CHANGE UP (ref 7–13)
POTASSIUM SERPL-MCNC: 3.9 MMOL/L — SIGNIFICANT CHANGE UP (ref 3.5–5.3)
POTASSIUM SERPL-SCNC: 3.9 MMOL/L — SIGNIFICANT CHANGE UP (ref 3.5–5.3)
RBC # BLD: 3.07 M/UL — LOW (ref 3.8–5.2)
RBC # FLD: 15.1 % — HIGH (ref 10.3–14.5)
SODIUM SERPL-SCNC: 143 MMOL/L — SIGNIFICANT CHANGE UP (ref 135–145)
WBC # BLD: 8.24 K/UL — SIGNIFICANT CHANGE UP (ref 3.8–10.5)
WBC # FLD AUTO: 8.24 K/UL — SIGNIFICANT CHANGE UP (ref 3.8–10.5)

## 2019-05-01 PROCEDURE — 71045 X-RAY EXAM CHEST 1 VIEW: CPT | Mod: 26

## 2019-05-01 RX ORDER — MAGNESIUM SULFATE 500 MG/ML
1 VIAL (ML) INJECTION ONCE
Qty: 0 | Refills: 0 | Status: COMPLETED | OUTPATIENT
Start: 2019-05-01 | End: 2019-05-01

## 2019-05-01 RX ORDER — LABETALOL HCL 100 MG
10 TABLET ORAL ONCE
Qty: 0 | Refills: 0 | Status: COMPLETED | OUTPATIENT
Start: 2019-05-01 | End: 2019-05-01

## 2019-05-01 RX ORDER — LABETALOL HCL 100 MG
10 TABLET ORAL EVERY 6 HOURS
Qty: 0 | Refills: 0 | Status: DISCONTINUED | OUTPATIENT
Start: 2019-05-01 | End: 2019-05-04

## 2019-05-01 RX ORDER — POTASSIUM CHLORIDE 20 MEQ
10 PACKET (EA) ORAL
Qty: 0 | Refills: 0 | Status: COMPLETED | OUTPATIENT
Start: 2019-05-01 | End: 2019-05-01

## 2019-05-01 RX ADMIN — Medication 100 MILLIEQUIVALENT(S): at 10:03

## 2019-05-01 RX ADMIN — Medication 100 GRAM(S): at 12:59

## 2019-05-01 RX ADMIN — Medication 10 MILLIGRAM(S): at 18:44

## 2019-05-01 RX ADMIN — Medication 0.62 MILLIGRAM(S): at 18:43

## 2019-05-01 RX ADMIN — PIPERACILLIN AND TAZOBACTAM 25 GRAM(S): 4; .5 INJECTION, POWDER, LYOPHILIZED, FOR SOLUTION INTRAVENOUS at 05:48

## 2019-05-01 RX ADMIN — HEPARIN SODIUM 5000 UNIT(S): 5000 INJECTION INTRAVENOUS; SUBCUTANEOUS at 05:48

## 2019-05-01 RX ADMIN — HEPARIN SODIUM 5000 UNIT(S): 5000 INJECTION INTRAVENOUS; SUBCUTANEOUS at 12:59

## 2019-05-01 RX ADMIN — Medication 10 MILLIGRAM(S): at 10:04

## 2019-05-01 NOTE — DIETITIAN INITIAL EVALUATION ADULT. - ENERGY NEEDS
IBW: 100lbs (+/-10%) %%  nonpitting right and left ankle edema noted. Surgical incision. pressure injury noted.

## 2019-05-01 NOTE — PROGRESS NOTE ADULT - ASSESSMENT
62y/o POD#5 from Ex-lap, LAR with re-anastomosis drainage of abscess cavity, excision of chronic IR drain, ARELIS, BSO for perforated diverticulitis and fibroid uterus in stable condition, post-op course now complicated by ileus. 62y/o POD#5 from Ex-lap, LAR with re-anastomosis drainage of abscess cavity, excision of chronic IR drain, ARELIS, BSO for perforated diverticulitis and fibroid uterus in stable condition, post-op course now complicated by ileus with NG tube placed.

## 2019-05-01 NOTE — DIETITIAN INITIAL EVALUATION ADULT. - OTHER INFO
Initial Dietitian Evaluation 2/2 to extended length of stay. During the course of admission, patient remains on NPO/clears x 7 days. Patient NPO during encounter. States had 3 bowel movements since yesterday. Patient denies any nausea/vomiting/diarrhea/constipation or difficulty chewing and swallowing. NKFA. Adhered to regular diet with Ensure 1-2x per day PTA. Patient denies any changes in appetite/po intake PTA. Her weight noted to be 199lbs (2/11/19) and weight noted on (4/26) 192.9lbs, about 3% insignificant weight loss over 2 months.

## 2019-05-01 NOTE — PROGRESS NOTE ADULT - PROBLEM SELECTOR PLAN 1
- Plan as per general surgery, appreciate their care   - Will continue to follow postoperative course    Abhi Lund, PGY-3  #[id number]

## 2019-05-01 NOTE — DIETITIAN INITIAL EVALUATION ADULT. - NS AS NUTRI INTERV MEALS SNACK
If clinically able, suggest advance diet as tolerated from clear liquid --Full liquids-->Low fiber 2. If unable to tolerate po intake, consider alternate means of nutrition.

## 2019-05-01 NOTE — PROGRESS NOTE ADULT - ASSESSMENT
Kell Washington is a 63 y.o. woman with history of HTN, HLD, uterine fibroid s/p myomectomy and perforated diverticulitis who was initially scheduled for hysterectomy, bilateral salpingo-oophorectomy, LAR and enterocutaneous fistula on 4/5, which was delayed for fever who presented to the ED on 4/22 for increased drainage from her drain. s/p open LAR, excision of IR drain tract, and ARELIS BSO 4/26.     Plan:  - monitor NGT output  - monitor bowel function   - Pain control - PRN Dilaudid  - f/u AM labs, trend CBC  - NPO for now  - f/u GI function  - Abx: Zosyn, ends 5/1  - Dvt ppx: HepSQ    A Surgery  [id number]

## 2019-05-01 NOTE — DIETITIAN INITIAL EVALUATION ADULT. - PERTINENT MEDS FT
MEDICATIONS  (STANDING):  dextrose 5% + sodium chloride 0.9% 1000 milliLiter(s) (100 mL/Hr) IV Continuous <Continuous>  enalaprilat Injectable 0.625 milliGRAM(s) IV Push every 6 hours  heparin  Injectable 5000 Unit(s) SubCutaneous every 8 hours  labetalol Injectable 10 milliGRAM(s) IV Push every 6 hours    MEDICATIONS  (PRN):  benzocaine 15 mG/menthol 3.6 mG Lozenge 1 Lozenge Oral four times a day PRN Sore Throat  HYDROmorphone  Injectable 0.5 milliGRAM(s) IV Push every 3 hours PRN Severe Pain (7 - 10)  HYDROmorphone  Injectable 0.25 milliGRAM(s) IV Push every 3 hours PRN Moderate Pain (4 - 6)

## 2019-05-01 NOTE — PROGRESS NOTE ADULT - PROBLEM SELECTOR PROBLEM 1
Post-operative state

## 2019-05-01 NOTE — PROGRESS NOTE ADULT - SUBJECTIVE AND OBJECTIVE BOX
GENERAL SURGERY DAILY PROGRESS NOTE:     Subjective:  Pt seen and examined. Yesterday pt uncomfotable w/ episodes of nausea/emesis. NGT placed, found to be in correct position. Pt w/ multiple small bms but no gas. h/h downtrending. Pain well controlled. Has been oob ambulating.     Objective:    MEDICATIONS  (STANDING):  atorvastatin 20 milliGRAM(s) Oral at bedtime  dextrose 5% + sodium chloride 0.9% 1000 milliLiter(s) (100 mL/Hr) IV Continuous <Continuous>  heparin  Injectable 5000 Unit(s) SubCutaneous every 8 hours  labetalol 100 milliGRAM(s) Oral two times a day  lisinopril 10 milliGRAM(s) Oral daily  piperacillin/tazobactam IVPB. 3.375 Gram(s) IV Intermittent every 8 hours    MEDICATIONS  (PRN):  benzocaine 15 mG/menthol 3.6 mG Lozenge 1 Lozenge Oral four times a day PRN Sore Throat  HYDROmorphone  Injectable 0.5 milliGRAM(s) IV Push every 3 hours PRN Severe Pain (7 - 10)  HYDROmorphone  Injectable 0.25 milliGRAM(s) IV Push every 3 hours PRN Moderate Pain (4 - 6)      Vital Signs Last 24 Hrs  T(C): 36.8 (30 Apr 2019 21:37), Max: 37.4 (30 Apr 2019 02:25)  T(F): 98.2 (30 Apr 2019 21:37), Max: 99.4 (30 Apr 2019 02:25)  HR: 89 (30 Apr 2019 21:37) (89 - 98)  BP: 142/70 (30 Apr 2019 21:37) (132/68 - 144/74)  BP(mean): --  RR: 18 (30 Apr 2019 21:37) (17 - 18)  SpO2: 99% (30 Apr 2019 21:37) (92% - 99%)    I&O's Detail    29 Apr 2019 07:01  -  30 Apr 2019 07:00  --------------------------------------------------------  IN:    dextrose 5% + sodium chloride 0.9%: 2100 mL    IV PiggyBack: 550 mL    Oral Fluid: 510 mL  Total IN: 3160 mL    OUT:    Indwelling Catheter - Urethral: 1125 mL    Voided: 300 mL  Total OUT: 1425 mL    Total NET: 1735 mL      30 Apr 2019 07:01  -  01 May 2019 01:20  --------------------------------------------------------  IN:    dextrose 5% + sodium chloride 0.9%: 1200 mL    IV PiggyBack: 300 mL    Oral Fluid: 120 mL  Total IN: 1620 mL    OUT:    Nasoenteral Tube: 900 mL    Voided: 1700 mL  Total OUT: 2600 mL    Total NET: -980 mL          Daily     Daily     LABS:                        7.5    11.36 )-----------( 369      ( 30 Apr 2019 06:50 )             24.6     04-30    138  |  101  |  4<L>  ----------------------------<  144<H>  3.9   |  26  |  0.72    Ca    8.5      30 Apr 2019 06:50  Phos  2.4     04-30  Mg     1.8     04-30      PT/INR - ( 29 Apr 2019 05:15 )   PT: 14.0 SEC;   INR: 1.22          PTT - ( 29 Apr 2019 05:15 )  PTT:28.6 SEC      RADIOLOGY & ADDITIONAL STUDIES:      PHYSICAL EXAM  NAD, awake and alert  Respirations nonlabored  Abdomen soft, appropriately tender, nondistended, incision/dressing c/d/i  No guarding or rebound tenderness

## 2019-05-01 NOTE — PROGRESS NOTE ADULT - SUBJECTIVE AND OBJECTIVE BOX
GYN ONC   HD#6, POD#5    Patient seen and examined at bedside, NGT placed overnight for further nausea/vomiting while NPO. Pain well controlled. Patient is ambulating, voiding spontaneously. Currently NPO with NGT in place, had 2 BMs overnight. Denies CP, SOB, fevers, and chills.    Vital Signs Last 24 Hours  T(C): 36.8 (05-01-19 @ 05:44), Max: 37.1 (05-01-19 @ 01:45)  HR: 84 (05-01-19 @ 05:44) (84 - 98)  BP: 165/86 (05-01-19 @ 05:44) (140/81 - 165/86)  RR: 18 (05-01-19 @ 05:44) (17 - 18)  SpO2: 93% (05-01-19 @ 05:44) (93% - 99%)    I&O's Detail    30 Apr 2019 07:01  -  01 May 2019 06:31  --------------------------------------------------------  IN:    dextrose 5% + sodium chloride 0.9%: 2400 mL    IV PiggyBack: 500 mL    Oral Fluid: 200 mL  Total IN: 3100 mL    OUT:    Nasoenteral Tube: 1600 mL    Voided: 2200 mL  Total OUT: 3800 mL    Total NET: -700 mL    Physical Exam:  General: NAD, NGT in place  CV: NR, RR, S1, S2, no M/R/G  Lungs: CTA-B  Abdomen: Soft, non-tender, non-distended, hypoactive BS  Incision: Dressing CDI, recently changed by surgery team    Labs:             7.5<L>  11.36<H> )-----------( 369      ( 04-30 @ 06:50 )             24.6<L>               8.4<L>  10.95<H> )-----------( 342      ( 04-29 @ 05:15 )             28.2<L>               8.0<L>  15.06<H> )-----------( 349      ( 04-28 @ 05:12 )             26.5<L>               8.7<L>  12.05<H> )-----------( 366      ( 04-27 @ 06:00 )             28.6<L>               9.5<L>  15.57<H> )-----------( 463<H>    ( 04-26 @ 14:28 )             32.0<L>        MEDICATIONS  (STANDING):  atorvastatin 20 milliGRAM(s) Oral at bedtime  dextrose 5% + sodium chloride 0.9% 1000 milliLiter(s) (100 mL/Hr) IV Continuous <Continuous>  heparin  Injectable 5000 Unit(s) SubCutaneous every 8 hours  labetalol 100 milliGRAM(s) Oral two times a day  lisinopril 10 milliGRAM(s) Oral daily    MEDICATIONS  (PRN):  benzocaine 15 mG/menthol 3.6 mG Lozenge 1 Lozenge Oral four times a day PRN Sore Throat  HYDROmorphone  Injectable 0.5 milliGRAM(s) IV Push every 3 hours PRN Severe Pain (7 - 10)  HYDROmorphone  Injectable 0.25 milliGRAM(s) IV Push every 3 hours PRN Moderate Pain (4 - 6) GYN ONC   HD#6, POD#5    Patient seen and examined at bedside, NGT placed overnight for further nausea/vomiting while NPO. Pain well controlled. Patient is ambulating, voiding spontaneously. Currently NPO with NGT in place, had 2 BMs overnight. Denies CP, SOB, fevers, and chills.    Vital Signs Last 24 Hours  T(C): 36.8 (05-01-19 @ 05:44), Max: 37.1 (05-01-19 @ 01:45)  HR: 84 (05-01-19 @ 05:44) (84 - 98)  BP: 165/86 (05-01-19 @ 05:44) (140/81 - 165/86)  RR: 18 (05-01-19 @ 05:44) (17 - 18)  SpO2: 93% (05-01-19 @ 05:44) (93% - 99%)    I&O's Detail    30 Apr 2019 07:01  -  01 May 2019 06:31  --------------------------------------------------------  IN:    dextrose 5% + sodium chloride 0.9%: 2400 mL    IV PiggyBack: 500 mL    Oral Fluid: 200 mL  Total IN: 3100 mL    OUT:    Nasoenteral Tube: 1600 mL    Voided: 2200 mL  Total OUT: 3800 mL    Total NET: -700 mL    Physical Exam:  General: NAD, NGT in place  CV: NR, RR, S1, S2, no M/R/G  Lungs: CTA-B  Abdomen: Soft, non-tender, mildly distended, hypoactive BS  Incision: Dressing CDI, recently changed by surgery team    Labs:             7.5<L>  11.36<H> )-----------( 369      ( 04-30 @ 06:50 )             24.6<L>               8.4<L>  10.95<H> )-----------( 342      ( 04-29 @ 05:15 )             28.2<L>               8.0<L>  15.06<H> )-----------( 349      ( 04-28 @ 05:12 )             26.5<L>               8.7<L>  12.05<H> )-----------( 366      ( 04-27 @ 06:00 )             28.6<L>               9.5<L>  15.57<H> )-----------( 463<H>    ( 04-26 @ 14:28 )             32.0<L>        MEDICATIONS  (STANDING):  atorvastatin 20 milliGRAM(s) Oral at bedtime  dextrose 5% + sodium chloride 0.9% 1000 milliLiter(s) (100 mL/Hr) IV Continuous <Continuous>  heparin  Injectable 5000 Unit(s) SubCutaneous every 8 hours  labetalol 100 milliGRAM(s) Oral two times a day  lisinopril 10 milliGRAM(s) Oral daily    MEDICATIONS  (PRN):  benzocaine 15 mG/menthol 3.6 mG Lozenge 1 Lozenge Oral four times a day PRN Sore Throat  HYDROmorphone  Injectable 0.5 milliGRAM(s) IV Push every 3 hours PRN Severe Pain (7 - 10)  HYDROmorphone  Injectable 0.25 milliGRAM(s) IV Push every 3 hours PRN Moderate Pain (4 - 6)

## 2019-05-01 NOTE — DIETITIAN INITIAL EVALUATION ADULT. - PERTINENT LABORATORY DATA
05-01 Na143 mmol/L Glu 129 mg/dL<H> K+ 3.9 mmol/L Cr  0.77 mg/dL BUN 3 mg/dL<L> 05-01 Phos 3.0 mg/dL 04-09 [id number] 6.6 %<H>

## 2019-05-01 NOTE — PROGRESS NOTE ADULT - ATTENDING COMMENTS
total 119.70
ECF w/ IR drain secondary to diverticular dx  -Clears  -Bowel prep  -Neomycin/flagyl  -OR tomorrow for hysterectomy and LAR/sigmoid resection
Pt seen and examined, agree with gyn housestaff  POD#2  Doing well  Plan per colorectal surgery
Pt seen and examined, agree with gyn housestaff  POD#1  Labs stable  Doing well  Exam benign  Plan per colorectal surgery
Pt seen and examined, agree with gyn housestaff  POD#4  Plan per colorectal surgery
Seen on 5/1 with F at ~745a. Agree with eval and plan. Will follow with CRS.

## 2019-05-02 LAB
ANION GAP SERPL CALC-SCNC: 13 MMO/L — SIGNIFICANT CHANGE UP (ref 7–14)
BUN SERPL-MCNC: 3 MG/DL — LOW (ref 7–23)
CALCIUM SERPL-MCNC: 9.1 MG/DL — SIGNIFICANT CHANGE UP (ref 8.4–10.5)
CHLORIDE SERPL-SCNC: 98 MMOL/L — SIGNIFICANT CHANGE UP (ref 98–107)
CO2 SERPL-SCNC: 28 MMOL/L — SIGNIFICANT CHANGE UP (ref 22–31)
CREAT SERPL-MCNC: 0.7 MG/DL — SIGNIFICANT CHANGE UP (ref 0.5–1.3)
GLUCOSE SERPL-MCNC: 121 MG/DL — HIGH (ref 70–99)
HCT VFR BLD CALC: 26.5 % — LOW (ref 34.5–45)
HGB BLD-MCNC: 8 G/DL — LOW (ref 11.5–15.5)
MAGNESIUM SERPL-MCNC: 2 MG/DL — SIGNIFICANT CHANGE UP (ref 1.6–2.6)
MCHC RBC-ENTMCNC: 25.7 PG — LOW (ref 27–34)
MCHC RBC-ENTMCNC: 30.2 % — LOW (ref 32–36)
MCV RBC AUTO: 85.2 FL — SIGNIFICANT CHANGE UP (ref 80–100)
NRBC # FLD: 0 K/UL — SIGNIFICANT CHANGE UP (ref 0–0)
PHOSPHATE SERPL-MCNC: 3.8 MG/DL — SIGNIFICANT CHANGE UP (ref 2.5–4.5)
PLATELET # BLD AUTO: 381 K/UL — SIGNIFICANT CHANGE UP (ref 150–400)
PMV BLD: 9.2 FL — SIGNIFICANT CHANGE UP (ref 7–13)
POTASSIUM SERPL-MCNC: 3.7 MMOL/L — SIGNIFICANT CHANGE UP (ref 3.5–5.3)
POTASSIUM SERPL-SCNC: 3.7 MMOL/L — SIGNIFICANT CHANGE UP (ref 3.5–5.3)
RBC # BLD: 3.11 M/UL — LOW (ref 3.8–5.2)
RBC # FLD: 15.3 % — HIGH (ref 10.3–14.5)
SODIUM SERPL-SCNC: 139 MMOL/L — SIGNIFICANT CHANGE UP (ref 135–145)
SURGICAL PATHOLOGY STUDY: SIGNIFICANT CHANGE UP
WBC # BLD: 7.08 K/UL — SIGNIFICANT CHANGE UP (ref 3.8–10.5)
WBC # FLD AUTO: 7.08 K/UL — SIGNIFICANT CHANGE UP (ref 3.8–10.5)

## 2019-05-02 PROCEDURE — 74018 RADEX ABDOMEN 1 VIEW: CPT | Mod: 26

## 2019-05-02 RX ORDER — POTASSIUM CHLORIDE 20 MEQ
10 PACKET (EA) ORAL
Qty: 0 | Refills: 0 | Status: COMPLETED | OUTPATIENT
Start: 2019-05-02 | End: 2019-05-02

## 2019-05-02 RX ORDER — PANTOPRAZOLE SODIUM 20 MG/1
40 TABLET, DELAYED RELEASE ORAL DAILY
Qty: 0 | Refills: 0 | Status: DISCONTINUED | OUTPATIENT
Start: 2019-05-02 | End: 2019-05-05

## 2019-05-02 RX ADMIN — Medication 0.62 MILLIGRAM(S): at 05:14

## 2019-05-02 RX ADMIN — Medication 10 MILLIGRAM(S): at 05:13

## 2019-05-02 RX ADMIN — Medication 10 MILLIGRAM(S): at 15:00

## 2019-05-02 RX ADMIN — Medication 10 MILLIGRAM(S): at 00:36

## 2019-05-02 RX ADMIN — Medication 0.62 MILLIGRAM(S): at 00:35

## 2019-05-02 RX ADMIN — Medication 0.62 MILLIGRAM(S): at 14:59

## 2019-05-02 RX ADMIN — HEPARIN SODIUM 5000 UNIT(S): 5000 INJECTION INTRAVENOUS; SUBCUTANEOUS at 05:14

## 2019-05-02 RX ADMIN — Medication 10 MILLIGRAM(S): at 20:37

## 2019-05-02 RX ADMIN — PANTOPRAZOLE SODIUM 40 MILLIGRAM(S): 20 TABLET, DELAYED RELEASE ORAL at 20:37

## 2019-05-02 RX ADMIN — Medication 0.62 MILLIGRAM(S): at 20:38

## 2019-05-02 RX ADMIN — HEPARIN SODIUM 5000 UNIT(S): 5000 INJECTION INTRAVENOUS; SUBCUTANEOUS at 00:34

## 2019-05-02 RX ADMIN — HEPARIN SODIUM 5000 UNIT(S): 5000 INJECTION INTRAVENOUS; SUBCUTANEOUS at 15:00

## 2019-05-02 RX ADMIN — HEPARIN SODIUM 5000 UNIT(S): 5000 INJECTION INTRAVENOUS; SUBCUTANEOUS at 22:01

## 2019-05-02 NOTE — PROGRESS NOTE ADULT - SUBJECTIVE AND OBJECTIVE BOX
GENERAL SURGERY DAILY PROGRESS NOTE:     Subjective:  Pt seen and examined. Pt w/ small bms but no gas. Pain well controlled. Has been oob ambulating. Has had high NGT output, AXR demonstrated NGT was too far, pulled back overnight.       Objective:  Vital Signs Last 24 Hrs  T(C): 37.2 (02 May 2019 05:11), Max: 37.3 (02 May 2019 00:22)  T(F): 98.9 (02 May 2019 05:11), Max: 99.2 (02 May 2019 02:25)  HR: 70 (02 May 2019 05:45) (67 - 88)  BP: 137/70 (02 May 2019 05:45) (131/67 - 162/88)  BP(mean): --  RR: 17 (02 May 2019 05:11) (16 - 18)  SpO2: 95% (02 May 2019 05:11) (92% - 97%)    I&O's Detail    01 May 2019 07:01  -  02 May 2019 07:00  --------------------------------------------------------  IN:    dextrose 5% + sodium chloride 0.9%: 2000 mL    Lactated Ringers IV Bolus: 950 mL  Total IN: 2950 mL    OUT:    Nasoenteral Tube: 1900 mL    Voided: 2950 mL  Total OUT: 4850 mL    Total NET: -1900 mL          MEDICATIONS  (STANDING):  dextrose 5% + sodium chloride 0.9% 1000 milliLiter(s) (100 mL/Hr) IV Continuous <Continuous>  enalaprilat Injectable 0.625 milliGRAM(s) IV Push every 6 hours  heparin  Injectable 5000 Unit(s) SubCutaneous every 8 hours  labetalol Injectable 10 milliGRAM(s) IV Push every 6 hours  potassium chloride  10 mEq/100 mL IVPB 10 milliEquivalent(s) IV Intermittent every 1 hour    MEDICATIONS  (PRN):  benzocaine 15 mG/menthol 3.6 mG Lozenge 1 Lozenge Oral four times a day PRN Sore Throat  HYDROmorphone  Injectable 0.5 milliGRAM(s) IV Push every 3 hours PRN Severe Pain (7 - 10)  HYDROmorphone  Injectable 0.25 milliGRAM(s) IV Push every 3 hours PRN Moderate Pain (4 - 6)                            8.0    7.08  )-----------( 381      ( 02 May 2019 05:40 )             26.5       05-02    139  |  98  |  3<L>  ----------------------------<  121<H>  3.7   |  28  |  0.70    Ca    9.1      02 May 2019 05:40  Phos  3.8     05-02  Mg     2.0     05-02            PHYSICAL EXAM  NAD, awake and alert  Respirations nonlabored  Abdomen soft, appropriately tender, nondistended, incision/dressing c/d/i  No guarding or rebound tenderness

## 2019-05-02 NOTE — PROGRESS NOTE ADULT - ASSESSMENT
Kell Washington is a 63 y.o. woman with history of HTN, HLD, uterine fibroid s/p myomectomy and perforated diverticulitis who was initially scheduled for hysterectomy, bilateral salpingo-oophorectomy, LAR and enterocutaneous fistula on 4/5, which was delayed for fever who presented to the ED on 4/22 for increased drainage from her drain. s/p open LAR, excision of IR drain tract, and AREILS BSO 4/26.     Plan:  - Abdominal XRay  - Monitor NGT output  - Monitor bowel function   - Pain control - PRN Dilaudid  - f/u AM labs, trend CBC  - NPO for now  - Dvt ppx: HepSQ    A Surgery  [id number]

## 2019-05-03 LAB
ANION GAP SERPL CALC-SCNC: 12 MMO/L — SIGNIFICANT CHANGE UP (ref 7–14)
BUN SERPL-MCNC: 3 MG/DL — LOW (ref 7–23)
CALCIUM SERPL-MCNC: 8.8 MG/DL — SIGNIFICANT CHANGE UP (ref 8.4–10.5)
CHLORIDE SERPL-SCNC: 101 MMOL/L — SIGNIFICANT CHANGE UP (ref 98–107)
CO2 SERPL-SCNC: 26 MMOL/L — SIGNIFICANT CHANGE UP (ref 22–31)
CREAT SERPL-MCNC: 0.71 MG/DL — SIGNIFICANT CHANGE UP (ref 0.5–1.3)
GLUCOSE SERPL-MCNC: 130 MG/DL — HIGH (ref 70–99)
HCT VFR BLD CALC: 25.8 % — LOW (ref 34.5–45)
HGB BLD-MCNC: 7.7 G/DL — LOW (ref 11.5–15.5)
MAGNESIUM SERPL-MCNC: 1.8 MG/DL — SIGNIFICANT CHANGE UP (ref 1.6–2.6)
MCHC RBC-ENTMCNC: 25.1 PG — LOW (ref 27–34)
MCHC RBC-ENTMCNC: 29.8 % — LOW (ref 32–36)
MCV RBC AUTO: 84 FL — SIGNIFICANT CHANGE UP (ref 80–100)
NRBC # FLD: 0.03 K/UL — SIGNIFICANT CHANGE UP (ref 0–0)
PHOSPHATE SERPL-MCNC: 3.4 MG/DL — SIGNIFICANT CHANGE UP (ref 2.5–4.5)
PLATELET # BLD AUTO: 412 K/UL — HIGH (ref 150–400)
PMV BLD: 9.2 FL — SIGNIFICANT CHANGE UP (ref 7–13)
POTASSIUM SERPL-MCNC: 4 MMOL/L — SIGNIFICANT CHANGE UP (ref 3.5–5.3)
POTASSIUM SERPL-SCNC: 4 MMOL/L — SIGNIFICANT CHANGE UP (ref 3.5–5.3)
RBC # BLD: 3.07 M/UL — LOW (ref 3.8–5.2)
RBC # FLD: 15.7 % — HIGH (ref 10.3–14.5)
SODIUM SERPL-SCNC: 139 MMOL/L — SIGNIFICANT CHANGE UP (ref 135–145)
WBC # BLD: 7.12 K/UL — SIGNIFICANT CHANGE UP (ref 3.8–10.5)
WBC # FLD AUTO: 7.12 K/UL — SIGNIFICANT CHANGE UP (ref 3.8–10.5)

## 2019-05-03 RX ADMIN — HEPARIN SODIUM 5000 UNIT(S): 5000 INJECTION INTRAVENOUS; SUBCUTANEOUS at 06:41

## 2019-05-03 RX ADMIN — Medication 0.62 MILLIGRAM(S): at 01:04

## 2019-05-03 RX ADMIN — Medication 10 MILLIGRAM(S): at 06:42

## 2019-05-03 RX ADMIN — Medication 0.62 MILLIGRAM(S): at 14:25

## 2019-05-03 RX ADMIN — Medication 0.62 MILLIGRAM(S): at 19:15

## 2019-05-03 RX ADMIN — Medication 10 MILLIGRAM(S): at 19:15

## 2019-05-03 RX ADMIN — Medication 10 MILLIGRAM(S): at 14:24

## 2019-05-03 RX ADMIN — Medication 0.62 MILLIGRAM(S): at 06:42

## 2019-05-03 RX ADMIN — HEPARIN SODIUM 5000 UNIT(S): 5000 INJECTION INTRAVENOUS; SUBCUTANEOUS at 14:16

## 2019-05-03 RX ADMIN — PANTOPRAZOLE SODIUM 40 MILLIGRAM(S): 20 TABLET, DELAYED RELEASE ORAL at 12:16

## 2019-05-03 RX ADMIN — Medication 10 MILLIGRAM(S): at 01:05

## 2019-05-03 NOTE — PROGRESS NOTE ADULT - ASSESSMENT
Kell Washington is a 63 y.o. woman with history of HTN, HLD, uterine fibroid s/p myomectomy and perforated diverticulitis who was initially scheduled for hysterectomy, bilateral salpingo-oophorectomy, LAR and enterocutaneous fistula on 4/5, which was delayed for fever who presented to the ED on 4/22 for increased drainage from her drain. s/p open LAR, excision of IR drain tract, and ARELIS BSO 4/26.     Plan:  - Monitor NGT output  - Monitor bowel function   - Pain control - PRN Dilaudid  - f/u AM labs, trend CBC  - NPO for now  - Dvt ppx: HepSQ    A Surgery  [id number]

## 2019-05-03 NOTE — PROGRESS NOTE ADULT - SUBJECTIVE AND OBJECTIVE BOX
GENERAL SURGERY DAILY PROGRESS NOTE:     Subjective:  Pt seen and examined. Pt w/ small bms but no gas. Pain well controlled. Has been oob ambulating. NGT output has decreased.      Objective:  Vital Signs Last 24 Hrs  T(C): 37.1 (02 May 2019 21:15), Max: 37.3 (02 May 2019 00:22)  T(F): 98.8 (02 May 2019 21:15), Max: 99.2 (02 May 2019 02:25)  HR: 84 (02 May 2019 21:15) (69 - 88)  BP: 153/81 (02 May 2019 21:15) (131/66 - 153/81)  BP(mean): --  RR: 17 (02 May 2019 21:15) (16 - 18)  SpO2: 94% (02 May 2019 21:15) (92% - 95%)    I&O's Detail    01 May 2019 07:01  -  02 May 2019 07:00  --------------------------------------------------------  IN:    dextrose 5% + sodium chloride 0.9%: 2000 mL    Lactated Ringers IV Bolus: 950 mL  Total IN: 2950 mL    OUT:    Nasoenteral Tube: 1900 mL    Voided: 2950 mL  Total OUT: 4850 mL    Total NET: -1900 mL      02 May 2019 07:01  -  03 May 2019 00:08  --------------------------------------------------------  IN:    dextrose 5% + sodium chloride 0.9%: 400 mL    Lactated Ringers IV Bolus: 450 mL  Total IN: 850 mL    OUT:    Nasoenteral Tube: 850 mL    Voided: 1200 mL  Total OUT: 2050 mL    Total NET: -1200 mL          MEDICATIONS  (STANDING):  dextrose 5% + sodium chloride 0.9% 1000 milliLiter(s) (100 mL/Hr) IV Continuous <Continuous>  enalaprilat Injectable 0.625 milliGRAM(s) IV Push every 6 hours  heparin  Injectable 5000 Unit(s) SubCutaneous every 8 hours  labetalol Injectable 10 milliGRAM(s) IV Push every 6 hours  pantoprazole  Injectable 40 milliGRAM(s) IV Push daily    MEDICATIONS  (PRN):  benzocaine 15 mG/menthol 3.6 mG Lozenge 1 Lozenge Oral four times a day PRN Sore Throat  HYDROmorphone  Injectable 0.5 milliGRAM(s) IV Push every 3 hours PRN Severe Pain (7 - 10)  HYDROmorphone  Injectable 0.25 milliGRAM(s) IV Push every 3 hours PRN Moderate Pain (4 - 6)                            8.0    7.08  )-----------( 381      ( 02 May 2019 05:40 )             26.5       05-02    139  |  98  |  3<L>  ----------------------------<  121<H>  3.7   |  28  |  0.70    Ca    9.1      02 May 2019 05:40  Phos  3.8     05-02  Mg     2.0     05-02          PHYSICAL EXAM  NAD, awake and alert  Respirations nonlabored  Abdomen soft, appropriately tender, nondistended, incision/dressing c/d/i  No guarding or rebound tenderness

## 2019-05-04 LAB
ANION GAP SERPL CALC-SCNC: 15 MMO/L — HIGH (ref 7–14)
BUN SERPL-MCNC: 4 MG/DL — LOW (ref 7–23)
CALCIUM SERPL-MCNC: 8.9 MG/DL — SIGNIFICANT CHANGE UP (ref 8.4–10.5)
CHLORIDE SERPL-SCNC: 103 MMOL/L — SIGNIFICANT CHANGE UP (ref 98–107)
CO2 SERPL-SCNC: 22 MMOL/L — SIGNIFICANT CHANGE UP (ref 22–31)
CREAT SERPL-MCNC: 0.81 MG/DL — SIGNIFICANT CHANGE UP (ref 0.5–1.3)
GLUCOSE SERPL-MCNC: 128 MG/DL — HIGH (ref 70–99)
HCT VFR BLD CALC: 25.1 % — LOW (ref 34.5–45)
HGB BLD-MCNC: 7.7 G/DL — LOW (ref 11.5–15.5)
MAGNESIUM SERPL-MCNC: 1.7 MG/DL — SIGNIFICANT CHANGE UP (ref 1.6–2.6)
MCHC RBC-ENTMCNC: 25.5 PG — LOW (ref 27–34)
MCHC RBC-ENTMCNC: 30.7 % — LOW (ref 32–36)
MCV RBC AUTO: 83.1 FL — SIGNIFICANT CHANGE UP (ref 80–100)
NRBC # FLD: 0 K/UL — SIGNIFICANT CHANGE UP (ref 0–0)
PHOSPHATE SERPL-MCNC: 3.4 MG/DL — SIGNIFICANT CHANGE UP (ref 2.5–4.5)
PLATELET # BLD AUTO: 440 K/UL — HIGH (ref 150–400)
PMV BLD: 9 FL — SIGNIFICANT CHANGE UP (ref 7–13)
POTASSIUM SERPL-MCNC: 3.9 MMOL/L — SIGNIFICANT CHANGE UP (ref 3.5–5.3)
POTASSIUM SERPL-SCNC: 3.9 MMOL/L — SIGNIFICANT CHANGE UP (ref 3.5–5.3)
RBC # BLD: 3.02 M/UL — LOW (ref 3.8–5.2)
RBC # FLD: 15.9 % — HIGH (ref 10.3–14.5)
SODIUM SERPL-SCNC: 140 MMOL/L — SIGNIFICANT CHANGE UP (ref 135–145)
WBC # BLD: 7.42 K/UL — SIGNIFICANT CHANGE UP (ref 3.8–10.5)
WBC # FLD AUTO: 7.42 K/UL — SIGNIFICANT CHANGE UP (ref 3.8–10.5)

## 2019-05-04 RX ORDER — LABETALOL HCL 100 MG
100 TABLET ORAL EVERY 12 HOURS
Qty: 0 | Refills: 0 | Status: DISCONTINUED | OUTPATIENT
Start: 2019-05-04 | End: 2019-05-06

## 2019-05-04 RX ORDER — MAGNESIUM SULFATE 500 MG/ML
2 VIAL (ML) INJECTION ONCE
Qty: 0 | Refills: 0 | Status: COMPLETED | OUTPATIENT
Start: 2019-05-04 | End: 2019-05-04

## 2019-05-04 RX ORDER — LISINOPRIL 2.5 MG/1
10 TABLET ORAL DAILY
Qty: 0 | Refills: 0 | Status: DISCONTINUED | OUTPATIENT
Start: 2019-05-04 | End: 2019-05-06

## 2019-05-04 RX ORDER — LISINOPRIL 2.5 MG/1
10 TABLET ORAL DAILY
Qty: 0 | Refills: 0 | Status: DISCONTINUED | OUTPATIENT
Start: 2019-05-04 | End: 2019-05-04

## 2019-05-04 RX ORDER — LABETALOL HCL 100 MG
100 TABLET ORAL DAILY
Qty: 0 | Refills: 0 | Status: DISCONTINUED | OUTPATIENT
Start: 2019-05-04 | End: 2019-05-04

## 2019-05-04 RX ADMIN — HEPARIN SODIUM 5000 UNIT(S): 5000 INJECTION INTRAVENOUS; SUBCUTANEOUS at 00:02

## 2019-05-04 RX ADMIN — Medication 0.62 MILLIGRAM(S): at 00:02

## 2019-05-04 RX ADMIN — Medication 10 MILLIGRAM(S): at 00:03

## 2019-05-04 RX ADMIN — Medication 50 GRAM(S): at 10:20

## 2019-05-04 RX ADMIN — HEPARIN SODIUM 5000 UNIT(S): 5000 INJECTION INTRAVENOUS; SUBCUTANEOUS at 14:36

## 2019-05-04 RX ADMIN — LISINOPRIL 10 MILLIGRAM(S): 2.5 TABLET ORAL at 10:19

## 2019-05-04 RX ADMIN — PANTOPRAZOLE SODIUM 40 MILLIGRAM(S): 20 TABLET, DELAYED RELEASE ORAL at 10:19

## 2019-05-04 RX ADMIN — HEPARIN SODIUM 5000 UNIT(S): 5000 INJECTION INTRAVENOUS; SUBCUTANEOUS at 06:28

## 2019-05-04 RX ADMIN — SODIUM CHLORIDE 100 MILLILITER(S): 9 INJECTION, SOLUTION INTRAVENOUS at 12:52

## 2019-05-04 RX ADMIN — Medication 100 MILLIGRAM(S): at 17:36

## 2019-05-04 NOTE — PROGRESS NOTE ADULT - ASSESSMENT
Kell Washington is a 63 y.o. woman with history of HTN, HLD, uterine fibroid s/p myomectomy and perforated diverticulitis who was initially scheduled for hysterectomy, bilateral salpingo-oophorectomy, LAR and enterocutaneous fistula on 4/5, which was delayed for fever who presented to the ED on 4/22 for increased drainage from her drain. s/p open LAR, excision of IR drain tract, and ARELIS BSO 4/26.     Plan:  - CLD  - Monitor bowel function   - Pain control   - f/u AM labs, trend CBC  - Dvt ppx: HepSQ    A Surgery  [id number]

## 2019-05-04 NOTE — PROGRESS NOTE ADULT - SUBJECTIVE AND OBJECTIVE BOX
GENERAL SURGERY DAILY PROGRESS NOTE:     Subjective:  Pt seen and examined. No acute events overnight. Pain well controlled. Denies n/v. Tolerating sips. Voiding. Reports she is feeling improved. Having loose bms, denies flatus.     Objective:    MEDICATIONS  (STANDING):  dextrose 5% + sodium chloride 0.9% 1000 milliLiter(s) (100 mL/Hr) IV Continuous <Continuous>  heparin  Injectable 5000 Unit(s) SubCutaneous every 8 hours  labetalol 100 milliGRAM(s) Oral every 12 hours  lisinopril 10 milliGRAM(s) Oral daily  magnesium sulfate  IVPB 2 Gram(s) IV Intermittent once  pantoprazole  Injectable 40 milliGRAM(s) IV Push daily    MEDICATIONS  (PRN):  benzocaine 15 mG/menthol 3.6 mG Lozenge 1 Lozenge Oral four times a day PRN Sore Throat  HYDROmorphone  Injectable 0.5 milliGRAM(s) IV Push every 3 hours PRN Severe Pain (7 - 10)  HYDROmorphone  Injectable 0.25 milliGRAM(s) IV Push every 3 hours PRN Moderate Pain (4 - 6)      Vital Signs Last 24 Hrs  T(C): 37.1 (04 May 2019 06:27), Max: 37.2 (03 May 2019 18:24)  T(F): 98.7 (04 May 2019 06:27), Max: 99 (03 May 2019 18:24)  HR: 82 (04 May 2019 06:27) (73 - 84)  BP: 124/67 (04 May 2019 06:27) (113/50 - 148/76)  BP(mean): --  RR: 18 (04 May 2019 06:27) (16 - 169)  SpO2: 98% (04 May 2019 06:27) (94% - 99%)    I&O's Detail    03 May 2019 07:01  -  04 May 2019 07:00  --------------------------------------------------------  IN:    dextrose 5% + sodium chloride 0.9%: 2500 mL  Total IN: 2500 mL    OUT:    Nasoenteral Tube: 35 mL    Voided: 900 mL  Total OUT: 935 mL    Total NET: 1565 mL          Daily     Daily     LABS:                        7.7    7.42  )-----------( 440      ( 04 May 2019 07:00 )             25.1     05-04    140  |  103  |  4<L>  ----------------------------<  128<H>  3.9   |  22  |  0.81    Ca    8.9      04 May 2019 07:00  Phos  3.4     05-04  Mg     1.7     05-04            RADIOLOGY & ADDITIONAL STUDIES:    PHYSICAL EXAM  NAD, awake and alert  Respirations nonlabored  Abdomen soft, appropriately tender, nondistended, incision/dressing c/d/i  No guarding or rebound tenderness

## 2019-05-05 LAB
ANION GAP SERPL CALC-SCNC: 12 MMO/L — SIGNIFICANT CHANGE UP (ref 7–14)
BUN SERPL-MCNC: 4 MG/DL — LOW (ref 7–23)
CALCIUM SERPL-MCNC: 8.8 MG/DL — SIGNIFICANT CHANGE UP (ref 8.4–10.5)
CHLORIDE SERPL-SCNC: 102 MMOL/L — SIGNIFICANT CHANGE UP (ref 98–107)
CO2 SERPL-SCNC: 24 MMOL/L — SIGNIFICANT CHANGE UP (ref 22–31)
CREAT SERPL-MCNC: 0.77 MG/DL — SIGNIFICANT CHANGE UP (ref 0.5–1.3)
GLUCOSE SERPL-MCNC: 127 MG/DL — HIGH (ref 70–99)
HCT VFR BLD CALC: 24.7 % — LOW (ref 34.5–45)
HGB BLD-MCNC: 7.4 G/DL — LOW (ref 11.5–15.5)
MAGNESIUM SERPL-MCNC: 1.9 MG/DL — SIGNIFICANT CHANGE UP (ref 1.6–2.6)
MCHC RBC-ENTMCNC: 25 PG — LOW (ref 27–34)
MCHC RBC-ENTMCNC: 30 % — LOW (ref 32–36)
MCV RBC AUTO: 83.4 FL — SIGNIFICANT CHANGE UP (ref 80–100)
NRBC # FLD: 0.04 K/UL — SIGNIFICANT CHANGE UP (ref 0–0)
PHOSPHATE SERPL-MCNC: 3.6 MG/DL — SIGNIFICANT CHANGE UP (ref 2.5–4.5)
PLATELET # BLD AUTO: 443 K/UL — HIGH (ref 150–400)
PMV BLD: 8.8 FL — SIGNIFICANT CHANGE UP (ref 7–13)
POTASSIUM SERPL-MCNC: 4 MMOL/L — SIGNIFICANT CHANGE UP (ref 3.5–5.3)
POTASSIUM SERPL-SCNC: 4 MMOL/L — SIGNIFICANT CHANGE UP (ref 3.5–5.3)
RBC # BLD: 2.96 M/UL — LOW (ref 3.8–5.2)
RBC # FLD: 15.9 % — HIGH (ref 10.3–14.5)
SODIUM SERPL-SCNC: 138 MMOL/L — SIGNIFICANT CHANGE UP (ref 135–145)
WBC # BLD: 4.82 K/UL — SIGNIFICANT CHANGE UP (ref 3.8–10.5)
WBC # FLD AUTO: 4.82 K/UL — SIGNIFICANT CHANGE UP (ref 3.8–10.5)

## 2019-05-05 RX ORDER — PANTOPRAZOLE SODIUM 20 MG/1
40 TABLET, DELAYED RELEASE ORAL
Qty: 0 | Refills: 0 | Status: DISCONTINUED | OUTPATIENT
Start: 2019-05-05 | End: 2019-05-06

## 2019-05-05 RX ORDER — ACETAMINOPHEN 500 MG
650 TABLET ORAL EVERY 6 HOURS
Qty: 0 | Refills: 0 | Status: DISCONTINUED | OUTPATIENT
Start: 2019-05-05 | End: 2019-05-06

## 2019-05-05 RX ORDER — OXYCODONE HYDROCHLORIDE 5 MG/1
10 TABLET ORAL EVERY 6 HOURS
Qty: 0 | Refills: 0 | Status: DISCONTINUED | OUTPATIENT
Start: 2019-05-05 | End: 2019-05-06

## 2019-05-05 RX ORDER — OXYCODONE HYDROCHLORIDE 5 MG/1
5 TABLET ORAL EVERY 4 HOURS
Qty: 0 | Refills: 0 | Status: DISCONTINUED | OUTPATIENT
Start: 2019-05-05 | End: 2019-05-06

## 2019-05-05 RX ADMIN — Medication 100 MILLIGRAM(S): at 17:54

## 2019-05-05 RX ADMIN — Medication 100 MILLIGRAM(S): at 05:08

## 2019-05-05 RX ADMIN — LISINOPRIL 10 MILLIGRAM(S): 2.5 TABLET ORAL at 05:08

## 2019-05-05 NOTE — PROVIDER CONTACT NOTE (OTHER) - ASSESSMENT
Pt A&OX4. Abdomen soft non tender. Dressing CDI. +voids clear yellow urine. No active bleeding. Pt A&OX4. Denies dizziness or SOB. Abdomen soft non tender. Dressing CDI. +flatus +bowel movement.  +voids clear yellow urine. No active bleeding.

## 2019-05-05 NOTE — PROGRESS NOTE ADULT - ASSESSMENT
Kell Washington is a 63 y.o. woman with history of HTN, HLD, uterine fibroid s/p myomectomy and perforated diverticulitis who was initially scheduled for hysterectomy, bilateral salpingo-oophorectomy, LAR and enterocutaneous fistula on 4/5, which was delayed for fever who presented to the ED on 4/22 for increased drainage from her drain. s/p open LAR, excision of IR drain tract, and ARELIS BSO 4/26.     Plan:  - LRD  - Monitor bowel function   - Pain control   - f/u AM labs, trend CBC  - Dvt ppx: HepSQ    A Surgery  [id number]

## 2019-05-05 NOTE — PROVIDER CONTACT NOTE (OTHER) - ACTION/TREATMENT ORDERED:
MD aware, continue to monitor
MD made aware, awaiting on order
No new orders.
md made aware, 2L NC applied, will continue to monitor
md made aware, will put in PO meds after rounds, will continue to monitor

## 2019-05-05 NOTE — PROGRESS NOTE ADULT - SUBJECTIVE AND OBJECTIVE BOX
GENERAL SURGERY DAILY PROGRESS NOTE:     Subjective:  Pt seen and examined. No acute events overnight. Tolerating cld. Denies n/v. Has had multiple episodes of gas, continues to have bms.     Objective:    MEDICATIONS  (STANDING):  heparin  Injectable 5000 Unit(s) SubCutaneous every 8 hours  labetalol 100 milliGRAM(s) Oral every 12 hours  lisinopril 10 milliGRAM(s) Oral daily  pantoprazole  Injectable 40 milliGRAM(s) IV Push daily    MEDICATIONS  (PRN):  benzocaine 15 mG/menthol 3.6 mG Lozenge 1 Lozenge Oral four times a day PRN Sore Throat  HYDROmorphone  Injectable 0.5 milliGRAM(s) IV Push every 3 hours PRN Severe Pain (7 - 10)  HYDROmorphone  Injectable 0.25 milliGRAM(s) IV Push every 3 hours PRN Moderate Pain (4 - 6)      Vital Signs Last 24 Hrs  T(C): 36.9 (05 May 2019 05:03), Max: 37.1 (04 May 2019 09:40)  T(F): 98.5 (05 May 2019 05:03), Max: 98.8 (04 May 2019 09:40)  HR: 95 (05 May 2019 05:03) (68 - 95)  BP: 159/97 (05 May 2019 05:03) (128/65 - 159/97)  BP(mean): --  RR: 19 (05 May 2019 05:03) (16 - 20)  SpO2: 98% (05 May 2019 05:03) (95% - 99%)    I&O's Detail    04 May 2019 07:01  -  05 May 2019 07:00  --------------------------------------------------------  IN:    dextrose 5% + sodium chloride 0.9%: 2400 mL  Total IN: 2400 mL    OUT:    Voided: 2500 mL  Total OUT: 2500 mL    Total NET: -100 mL          Daily     Daily     LABS:                        7.4    4.82  )-----------( 443      ( 05 May 2019 07:30 )             24.7     05-05    138  |  102  |  4<L>  ----------------------------<  127<H>  4.0   |  24  |  0.77    Ca    8.8      05 May 2019 07:30  Phos  3.6     05-05  Mg     1.9     05-05            RADIOLOGY & ADDITIONAL STUDIES:    PHYSICAL EXAM  NAD, awake and alert  Respirations nonlabored  Abdomen soft, appropriately tender, nondistended, incision/dressing c/d/i  No guarding or rebound tenderness

## 2019-05-06 ENCOUNTER — TRANSCRIPTION ENCOUNTER (OUTPATIENT)
Age: 64
End: 2019-05-06

## 2019-05-06 VITALS
TEMPERATURE: 99 F | HEART RATE: 65 BPM | SYSTOLIC BLOOD PRESSURE: 148 MMHG | RESPIRATION RATE: 16 BRPM | DIASTOLIC BLOOD PRESSURE: 85 MMHG

## 2019-05-06 LAB
ANION GAP SERPL CALC-SCNC: 11 MMO/L — SIGNIFICANT CHANGE UP (ref 7–14)
BLD GP AB SCN SERPL QL: NEGATIVE — SIGNIFICANT CHANGE UP
BUN SERPL-MCNC: 10 MG/DL — SIGNIFICANT CHANGE UP (ref 7–23)
CALCIUM SERPL-MCNC: 8.8 MG/DL — SIGNIFICANT CHANGE UP (ref 8.4–10.5)
CHLORIDE SERPL-SCNC: 106 MMOL/L — SIGNIFICANT CHANGE UP (ref 98–107)
CO2 SERPL-SCNC: 23 MMOL/L — SIGNIFICANT CHANGE UP (ref 22–31)
CREAT SERPL-MCNC: 0.89 MG/DL — SIGNIFICANT CHANGE UP (ref 0.5–1.3)
GLUCOSE SERPL-MCNC: 102 MG/DL — HIGH (ref 70–99)
HCT VFR BLD CALC: 25.1 % — LOW (ref 34.5–45)
HGB BLD-MCNC: 7.4 G/DL — LOW (ref 11.5–15.5)
MAGNESIUM SERPL-MCNC: 1.8 MG/DL — SIGNIFICANT CHANGE UP (ref 1.6–2.6)
MCHC RBC-ENTMCNC: 25.3 PG — LOW (ref 27–34)
MCHC RBC-ENTMCNC: 29.5 % — LOW (ref 32–36)
MCV RBC AUTO: 85.7 FL — SIGNIFICANT CHANGE UP (ref 80–100)
NRBC # FLD: 0 K/UL — SIGNIFICANT CHANGE UP (ref 0–0)
PHOSPHATE SERPL-MCNC: 3.8 MG/DL — SIGNIFICANT CHANGE UP (ref 2.5–4.5)
PLATELET # BLD AUTO: 397 K/UL — SIGNIFICANT CHANGE UP (ref 150–400)
PMV BLD: 9 FL — SIGNIFICANT CHANGE UP (ref 7–13)
POTASSIUM SERPL-MCNC: 4.1 MMOL/L — SIGNIFICANT CHANGE UP (ref 3.5–5.3)
POTASSIUM SERPL-SCNC: 4.1 MMOL/L — SIGNIFICANT CHANGE UP (ref 3.5–5.3)
RBC # BLD: 2.93 M/UL — LOW (ref 3.8–5.2)
RBC # FLD: 16 % — HIGH (ref 10.3–14.5)
RH IG SCN BLD-IMP: POSITIVE — SIGNIFICANT CHANGE UP
SODIUM SERPL-SCNC: 140 MMOL/L — SIGNIFICANT CHANGE UP (ref 135–145)
WBC # BLD: 5.78 K/UL — SIGNIFICANT CHANGE UP (ref 3.8–10.5)
WBC # FLD AUTO: 5.78 K/UL — SIGNIFICANT CHANGE UP (ref 3.8–10.5)

## 2019-05-06 RX ORDER — ACETAMINOPHEN 500 MG
2 TABLET ORAL
Qty: 0 | Refills: 0 | COMMUNITY
Start: 2019-05-06

## 2019-05-06 RX ORDER — MAGNESIUM SULFATE 500 MG/ML
1 VIAL (ML) INJECTION ONCE
Qty: 0 | Refills: 0 | Status: COMPLETED | OUTPATIENT
Start: 2019-05-06 | End: 2019-05-06

## 2019-05-06 RX ORDER — LISINOPRIL 2.5 MG/1
1 TABLET ORAL
Qty: 0 | Refills: 0 | COMMUNITY

## 2019-05-06 RX ADMIN — PANTOPRAZOLE SODIUM 40 MILLIGRAM(S): 20 TABLET, DELAYED RELEASE ORAL at 05:54

## 2019-05-06 NOTE — PROGRESS NOTE ADULT - ASSESSMENT
Kell Washington is a 63 y.o. woman with history of HTN, HLD, uterine fibroid s/p myomectomy and perforated diverticulitis who was initially scheduled for hysterectomy, bilateral salpingo-oophorectomy, LAR and enterocutaneous fistula on 4/5, which was delayed for fever who presented to the ED on 4/22 for increased drainage from her drain. s/p open LAR, excision of IR drain tract, and ARELIS BSO 4/26.     Plan:  - LRD  - Monitor bowel function   - Pain control   - f/u AM labs, trend CBC  - Dvt ppx: HepSQ  - possible d/c    A Surgery  [id number]

## 2019-05-06 NOTE — DISCHARGE NOTE NURSING/CASE MANAGEMENT/SOCIAL WORK - NSDCPNINST_GEN_ALL_CORE
Follow up with MD. Continue all medications as prescribed. Notify MD if signs and symptoms of infection occur such as fever >100.4, foul smelling drainage, redness or increased warmth occurs at incision site. Notify MD if pain not relieved by medication, nausea or vomiting, inability to tolerate diet occurs. Do not submerge wound in water. You may shower, pat dry area. No lotions or powders. No heavy lifting or straining. Follow up with MD. Continue all medications as prescribed. Notify MD if signs and symptoms of infection occur such as fever >100.4, foul smelling drainage, redness or increased warmth occurs at incision site. Notify MD if pain not relieved by medication, nausea or vomiting, inability to tolerate low fiber diet occurs. You may shower, pat dry area. No lotions or powders. Do not submerge incision in water. No heavy lifting or straining.

## 2019-05-06 NOTE — DISCHARGE NOTE PROVIDER - NSDCCPTREATMENT_GEN_ALL_CORE_FT
PRINCIPAL PROCEDURE  Procedure: Resection, colon, low anterior, open  Findings and Treatment:       SECONDARY PROCEDURE  Procedure: Total abdominal hysterectomy with salpingo-oophorectomy  Findings and Treatment:     Procedure: Low anterior resection of rectum and total excision of mesorectum  Findings and Treatment:

## 2019-05-06 NOTE — PROGRESS NOTE ADULT - PROVIDER SPECIALTY LIST ADULT
Anesthesia
Anesthesia
Gyn Onc
Pain Medicine
Pharmacy
Surgery
Pain Medicine
Surgery
Surgery

## 2019-05-06 NOTE — PROGRESS NOTE ADULT - SUBJECTIVE AND OBJECTIVE BOX
GENERAL SURGERY DAILY PROGRESS NOTE:     Subjective:  Pt seen and examined. No acute events overnight. Tolerating diet. Passing flatus and bms. Pain well controlled. Has been oob, voiding.     Objective:    MEDICATIONS  (STANDING):  heparin  Injectable 5000 Unit(s) SubCutaneous every 8 hours  labetalol 100 milliGRAM(s) Oral every 12 hours  lisinopril 10 milliGRAM(s) Oral daily  pantoprazole    Tablet 40 milliGRAM(s) Oral before breakfast    MEDICATIONS  (PRN):  acetaminophen   Tablet .. 650 milliGRAM(s) Oral every 6 hours PRN Mild Pain (1 - 3)  benzocaine 15 mG/menthol 3.6 mG Lozenge 1 Lozenge Oral four times a day PRN Sore Throat  oxyCODONE    IR 5 milliGRAM(s) Oral every 4 hours PRN Moderate Pain (4 - 6)  oxyCODONE    IR 10 milliGRAM(s) Oral every 6 hours PRN Severe Pain (7 - 10)      Vital Signs Last 24 Hrs  T(C): 36.9 (05 May 2019 22:00), Max: 37.6 (05 May 2019 17:28)  T(F): 98.4 (05 May 2019 22:00), Max: 99.7 (05 May 2019 17:28)  HR: 77 (05 May 2019 22:00) (68 - 95)  BP: 102/60 (05 May 2019 22:00) (102/60 - 159/97)  BP(mean): --  RR: 16 (05 May 2019 22:00) (16 - 20)  SpO2: 97% (05 May 2019 22:00) (97% - 100%)    I&O's Detail    04 May 2019 07:01  -  05 May 2019 07:00  --------------------------------------------------------  IN:    dextrose 5% + sodium chloride 0.9%: 2400 mL  Total IN: 2400 mL    OUT:    Voided: 2500 mL  Total OUT: 2500 mL    Total NET: -100 mL      05 May 2019 07:01  -  06 May 2019 00:31  --------------------------------------------------------  IN:    Oral Fluid: 720 mL  Total IN: 720 mL    OUT:    Voided: 600 mL  Total OUT: 600 mL    Total NET: 120 mL          Daily     Daily     LABS:                        7.4    4.82  )-----------( 443      ( 05 May 2019 07:30 )             24.7     05-05    138  |  102  |  4<L>  ----------------------------<  127<H>  4.0   |  24  |  0.77    Ca    8.8      05 May 2019 07:30  Phos  3.6     05-05  Mg     1.9     05-05            RADIOLOGY & ADDITIONAL STUDIES:    PHYSICAL EXAM  NAD, awake and alert  Respirations nonlabored  Abdomen soft, appropriately tender, nondistended, incision/dressing c/d/i  No guarding or rebound tenderness

## 2019-05-06 NOTE — DISCHARGE NOTE PROVIDER - NSDCCPCAREPLAN_GEN_ALL_CORE_FT
PRINCIPAL DISCHARGE DIAGNOSIS  Diagnosis: Diverticulitis  Assessment and Plan of Treatment: of large intestine with perforation and abscess without bleeding

## 2019-05-06 NOTE — DISCHARGE NOTE NURSING/CASE MANAGEMENT/SOCIAL WORK - NSDCDPATPORTLINK_GEN_ALL_CORE
You can access the [facility], offered by [facility], by registering with the following website: [email or web address]

## 2019-05-06 NOTE — PROGRESS NOTE ADULT - REASON FOR ADMISSION
Perforated Diverticulitis

## 2019-05-06 NOTE — DISCHARGE NOTE PROVIDER - CARE PROVIDERS DIRECT ADDRESSES
[email or web address]

## 2019-05-06 NOTE — DISCHARGE NOTE PROVIDER - NSDCFUADDINST_GEN_ALL_CORE_FT
DIET:   eat a low residue (low fiber) diet as instructed by our nursing team  ACTIVITY:   do not lift more than 10 lbs (about the weight of a gallon of milk) for 6 weeks. No strenuous activity such as running, biking, yoga, etc for a total of 6 weeks. Stairs and walking are ok.    FOLLOW-UP:  -   Please call [phone] to schedule a follow-up appointment with your GYN ONCOLOGIST,  Dr. Garcia in 1-2 weeks.    -   Please call [phone] to schedule a follow-up appointment with your COLORECTAL Surgeon,  Dr. Puckett in 2 weeks.    How do I take care of my incision?  -  You may shower and/or sponge bathe after 2 days.  -  Clean the area with warm soapy water, and DO NOT RUB the area.  -  Please do not submerge your wound underwater for 2 weeks (no baths).    When should I call my doctor?  -  If you have increased redness, or purple streaking along the incision.  -  If you have an oral temperature over 100.4 degrees.  -  If you have any yellowish or greenish drainage from the incisions or notice a foul odor.  -  If you have bleeding from the incisions that is difficult to control with light pressure.  -  If you have severe or increased pain that is not controlled by taking the discharge pain medications as prescribed. DIET:   eat a low residue (low fiber) diet as instructed by our nursing team  ACTIVITY:   do not lift more than 10 lbs (about the weight of a gallon of milk) for 6 weeks. No strenuous activity such as running, biking, yoga, etc for a total of 6 weeks. Stairs and walking are ok.    FOLLOW-UP:  -   Please call [phone] to schedule a follow-up appointment with your GYN ONCOLOGIST,  Dr. Garcia in 1-2 weeks.    -   Please call [phone] to schedule a follow-up appointment with your COLORECTAL Surgeon,  Dr. Puckett in 2 weeks.    How do I take care of my incision?  -  You may shower and/or sponge bathe after 2 days.  -  Clean the area with warm soapy water, and DO NOT RUB the area.  -  Please do not submerge your wound underwater for 2 weeks (no baths).  -  For the spot over your prior drain site, please dampen a piece of gauze in saline fluid and place over the raw portion of the wound only. Try to minimize the wet portion of the gauze from touching the intact skin (only the raw, red part). Cover this with one or two pieces of dry gauze and tape it over the wound. You can change this dressing once daily, and continue changing it until either told to stop in clinic or the raw surface of the wound has been completely healed over with intact skin.    When should I call my doctor?  -  If you have increased redness, or purple streaking along the incision.  -  If you have an oral temperature over 100.4 degrees.  -  If you have any yellowish or greenish drainage from the incisions or notice a foul odor.  -  If you have bleeding from the incisions that is difficult to control with light pressure.  -  If you have severe or increased pain that is not controlled by taking the discharge pain medications as prescribed. DIET:   eat a low residue (low fiber) diet as instructed by our nursing team  ACTIVITY:   do not lift more than 10 lbs (about the weight of a gallon of milk) for 6 weeks. No strenuous activity such as running, biking, yoga, etc for a total of 6 weeks. Stairs and walking are ok.    FOLLOW-UP:  -   Please call [phone] to schedule a follow-up appointment with your GYN ONCOLOGIST,  Dr. Garcia in 1-2 weeks.    -   Please call [phone] to schedule a follow-up appointment with your COLORECTAL Surgeon,  Dr. Puckett in 2 weeks.    -  Please call your primary care physician in the following week to schedule a follow-up appointment to discuss your blood pressure medications. Your blood pressure was well-controlled without your lisinopril most days, and you will need to discuss with your PCP whether or not you should continue to take the same blood pressure regimen. We are holding your lisinopril upon discharge and your PCP may want to restart it after seeing you in clinic.    How do I take care of my incision?  -  You may shower and/or sponge bathe after 2 days.  -  Clean the area with warm soapy water, and DO NOT RUB the area.  -  Please do not submerge your wound underwater for 2 weeks (no baths).  -  For the spot over your prior drain site, please dampen a piece of gauze in saline fluid and place over the raw portion of the wound only. Try to minimize the wet portion of the gauze from touching the intact skin (only the raw, red part). Cover this with one or two pieces of dry gauze and tape it over the wound. You can change this dressing once daily, and continue changing it until either told to stop in clinic or the raw surface of the wound has been completely healed over with intact skin.    When should I call my doctor?  -  If you have increased redness, or purple streaking along the incision.  -  If you have an oral temperature over 100.4 degrees.  -  If you have any yellowish or greenish drainage from the incisions or notice a foul odor.  -  If you have bleeding from the incisions that is difficult to control with light pressure.  -  If you have severe or increased pain that is not controlled by taking the discharge pain medications as prescribed.

## 2019-05-06 NOTE — DISCHARGE NOTE PROVIDER - CARE PROVIDER_API CALL
Linda Garcia)  Gynecologic Oncology; Obstetrics and Gynecology  [address] [facility], [address] [place] [address]  Phone: [phone]  Fax: [phone]  Follow Up Time:     Ben Puckett)  ColonRectal Surgery; Surgery  [facility], [address] [place] [id number]  Phone: [phone]  Fax: [phone]  Follow Up Time:

## 2019-05-06 NOTE — DISCHARGE NOTE PROVIDER - HOSPITAL COURSE
Kell Washington is a 63 y.o. woman with history of HTN, HLD, uterine fibroid s/p myomectomy and perforated diverticulitis who was initially scheduled for hysterectomy, bilateral salpingo-oophorectomy, LAR and enterocutaneous fistula on 4/5 which was delayed for fever.    She presented to the ED on 4/22 for increased drainage from her drain. However, size of collection overall decreased from last check.     She was admitted 4/22 and started on IV zosyn and IR deferred a tube study.         She underwent LAR with excision of previous IR drain site as joint procedure with GYN doing ARELIS/BSO on 4/26.     Her PCEA was removed by anesthesia on 4/29.    Her diet was progressed to CLD.    NGT was placed 4/30 for inc nausea and vomiting.        Pt had return of bowel function 5/3.        Progressed to regular diet 5/4.        On 5/6 the pt was deemed stable for discharge. They are ambulating, tolerating a regular diet, and their pain has been well-controlled on an oral regimen. They will be discharged to home and will follow-up with Dr. Garcia (GYN ONC) and Dr. Puckett (COLORECTAL) in clinic.

## 2019-05-09 ENCOUNTER — APPOINTMENT (OUTPATIENT)
Dept: GYNECOLOGIC ONCOLOGY | Facility: CLINIC | Age: 64
End: 2019-05-09
Payer: COMMERCIAL

## 2019-05-09 ENCOUNTER — APPOINTMENT (OUTPATIENT)
Dept: COLORECTAL SURGERY | Facility: CLINIC | Age: 64
End: 2019-05-09
Payer: COMMERCIAL

## 2019-05-09 PROCEDURE — 99024 POSTOP FOLLOW-UP VISIT: CPT

## 2019-05-09 NOTE — ASSESSMENT
[FreeTextEntry1] : Diverticulitis with fibroid uterus\par -Patient progressing well\par -Low residue diet for 2 more weeks followed by high fiber diet\par -Followup in one week for remaining staple removal

## 2019-05-09 NOTE — HISTORY OF PRESENT ILLNESS
[FreeTextEntry1] : 64-year-old female status post sigmoid colon resection and hysterectomy for perforated diverticular disease with enterocutaneous fistula. The patient progressed well. Improving pain. Denies fevers or chills. Tolerating diet normal bowel movements

## 2019-05-16 ENCOUNTER — APPOINTMENT (OUTPATIENT)
Dept: COLORECTAL SURGERY | Facility: CLINIC | Age: 64
End: 2019-05-16
Payer: COMMERCIAL

## 2019-05-16 PROCEDURE — 99024 POSTOP FOLLOW-UP VISIT: CPT

## 2019-05-16 NOTE — ASSESSMENT
[FreeTextEntry1] : Diverticulitis\par - patient progressing well\par -Advance to a high-fiber diet\par -Continue wound care\par -Follow up in 4 weeks for reevaluation

## 2019-05-16 NOTE — HISTORY OF PRESENT ILLNESS
[FreeTextEntry1] : Status post hysterectomy and sigmoid colon resection for enterocutaneous fistula and perforated diverticulitis. Patient progressing well. Improving pain. Tolerating diet. Denies fevers or chills

## 2019-06-13 ENCOUNTER — APPOINTMENT (OUTPATIENT)
Dept: GYNECOLOGIC ONCOLOGY | Facility: CLINIC | Age: 64
End: 2019-06-13
Payer: COMMERCIAL

## 2019-06-13 VITALS
TEMPERATURE: 96.6 F | SYSTOLIC BLOOD PRESSURE: 129 MMHG | WEIGHT: 188.25 LBS | DIASTOLIC BLOOD PRESSURE: 81 MMHG | HEIGHT: 60 IN | HEART RATE: 72 BPM | BODY MASS INDEX: 36.96 KG/M2

## 2019-06-13 PROCEDURE — 99024 POSTOP FOLLOW-UP VISIT: CPT

## 2019-06-25 ENCOUNTER — APPOINTMENT (OUTPATIENT)
Dept: COLORECTAL SURGERY | Facility: CLINIC | Age: 64
End: 2019-06-25
Payer: COMMERCIAL

## 2019-06-25 DIAGNOSIS — D25.9 LEIOMYOMA OF UTERUS, UNSPECIFIED: ICD-10-CM

## 2019-06-25 PROCEDURE — 99024 POSTOP FOLLOW-UP VISIT: CPT

## 2019-06-25 RX ORDER — METRONIDAZOLE 500 MG/1
500 TABLET ORAL
Qty: 3 | Refills: 0 | Status: DISCONTINUED | COMMUNITY
Start: 2019-04-03 | End: 2019-06-25

## 2019-06-25 RX ORDER — NEOMYCIN SULFATE 500 MG/1
500 TABLET ORAL
Qty: 6 | Refills: 0 | Status: DISCONTINUED | COMMUNITY
Start: 2019-04-03 | End: 2019-06-25

## 2019-06-25 NOTE — HISTORY OF PRESENT ILLNESS
[FreeTextEntry1] : Status post low anterior resection with hysterectomy for perforated diverticular disease. Patient reports significant improvement. Denies pain. Tolerating diet. No fevers or chills

## 2019-06-25 NOTE — ASSESSMENT
[FreeTextEntry1] : Diverticulitis\par -Patient progressing well\par -High fiber diet\par Diet followup in 6-8 weeks for flexible sigmoidoscopy to evaluate anastomosis

## 2019-08-06 ENCOUNTER — APPOINTMENT (OUTPATIENT)
Dept: COLORECTAL SURGERY | Facility: CLINIC | Age: 64
End: 2019-08-06
Payer: COMMERCIAL

## 2019-08-06 DIAGNOSIS — K57.20 DIVERTICULITIS OF LARGE INTESTINE WITH PERFORATION AND ABSCESS W/OUT BLEEDING: ICD-10-CM

## 2019-08-06 PROCEDURE — 45330 DIAGNOSTIC SIGMOIDOSCOPY: CPT

## 2019-08-06 PROCEDURE — 99213 OFFICE O/P EST LOW 20 MIN: CPT | Mod: 25

## 2019-08-06 NOTE — HISTORY OF PRESENT ILLNESS
[FreeTextEntry1] : Status post sigmoid colon resection and hysterectomy for perforated diverticulitis. Patient progressing well. Tolerating diet. Denies pain. No fevers or chills. No nausea or vomiting. Normal bowel movements

## 2019-08-06 NOTE — ASSESSMENT
[FreeTextEntry1] : Perforated diverticulitis with fibroid uterus\par -Patient progressing well\par -High fiber diet\par

## 2019-12-12 NOTE — H&P PST ADULT - NSANTHBPHIGHRD_ENT_A_CORE
Screen time - not more than 2 hours per day in total  Stop juice  Try whole grain breads - make sure says \"enriched whole wheat\"  Tell grandma stop cooking the rice for now; maybe 1-2x per week   See food plate that is given today- fill with fruits and veggies as discussed  NO eating after 630-7pm  Some form of physical activity for one hour per day; mom has a stationary bike at home- maybe try 3x per week for 10min and increase from there    Make appt with Sheryl Love    Yes

## 2020-06-18 NOTE — PROGRESS NOTE ADULT - PROVIDER SPECIALTY LIST ADULT
How Severe Is Your Skin Lesion?: moderate
Surgery
Has Your Skin Lesion Been Treated?: not been treated
Is This A New Presentation, Or A Follow-Up?: Growth

## 2020-09-04 NOTE — DISCHARGE NOTE ADULT - REASON FOR ADMISSION
----- Message from Melly Sims MD sent at 9/4/2020  7:26 AM EDT -----  Lab work does demonstrate low total protein but otherwise no kidney stress and minimal heart failure.   Would recommend a chest x-ray with a diagnosis of shortness of breath and echocardiogram.  Would also recommend increasing Demadex to 2 pills twice daily
Pt advise.  Orders in epic
Diverticular abscess

## 2020-10-24 NOTE — PATIENT PROFILE ADULT - DOES PATIENT HAVE ADVANCE DIRECTIVE
Goal Outcome Evaluation:  Plan of Care Reviewed With: patient     Outcome Summary: Pt VSS stable,remains on IVF at rate 100cc/hrs. planned Colonoscopy tomorrow. will continue to monitor.   no

## 2021-03-17 NOTE — H&P ADULT - NSHPRISKHIVSCREEN_GEN_ALL_CORE
Unable to offer due to clinical condition
impairments found/functional limitations in following categories/risk reduction/prevention/rehab potential/therapy frequency/predicted duration of therapy intervention/anticipated equipment needs at discharge/anticipated discharge recommendation

## 2021-04-13 NOTE — PRE-OP CHECKLIST - PATIENT'S PERSONAL PROPERTY GIVEN TO
on unit
(2) Severe dysarthria; patients speech is so slurred as to be unintelligible in the absence of or out of proportion to any dysphasia, or is mute/anarthric

## 2021-12-09 NOTE — PROGRESS NOTE ADULT - NEGATIVE GENERAL SYMPTOMS
Patient requesting a medication refill.   Medication vitamin D (ERGOCALCIFEROL)   Dosage  1.25 MG (43777 UT) CAPS   FrequencyTake 1 capsule by mouth once a week  Last filled on 9/10/21  [id number] [place], [address] [phone] - F [phone] no fever/no chills

## 2022-08-17 NOTE — H&P PST ADULT - VENOUS THROMBOEMBOLISM BMI
Javed Barbour MD: I have personally performed a face to face diagnostic evaluation on this patient.  I have reviewed the PA note and agree with the history, exam, and plan of care, except as noted.  History and Exam by me shows same findings as documented    Att note: Bursa drained (1) obesity (BMI greater than 25)

## 2023-02-21 NOTE — PATIENT PROFILE ADULT - HARM RISK FACTORS
no
Render Risk Assessment In Note?: yes
Detail Level: Simple
Additional Notes: Patient consent was obtained to proceed with the visit and recommended plan of care after discussion of all risks and benefits, including the risks of COVID-19 exposure.

## 2023-03-30 NOTE — PROVIDER CONTACT NOTE (OTHER) - BACKGROUND
admitted with diverticulitis, febrile during ER visit purulent drainage from michael drain
s/p LAR
s/p LAR ARELIS, BSO excision of old drain, Obesity, Fibroid Uterus, Diverticulitis
s/p hysterectomy, salpinoogectomy, LAR
s/p hysterectomy, salpinoogectomy, LAR
Name band;

## 2023-09-15 NOTE — ED ADULT TRIAGE NOTE - PRO INTERPRETER NEED 2
Orthopaedic Surgery Daily Progress Note    Date of Service:   09/15/23      Subjective:   Fever to 101 overnight, given tylenol, resolved. No significant symptoms noted  Pain well-controlled  Ambulated 100 ft  Denies, new numbness/tingling, weakness or any further concerns    Objective:  Physical Exam:  Gen: Well developed, well nourished, resting comfortably in no acute distress  Skin: Warm, dry  Head: Normocephalic, atraumatic  Neck: Supple, no tenderness  Eye: EOMI, normal conjunctiva  Ears/Nose/Throat: Moist mucous membranes  CV: Regular rate. Palpable radial pulses bilaterally. Brisk capillary refill <2 seconds in BUE.  Resp: Respirations are non-labored   MSK:   Dressing cdi  Drain 440 cc since surgery 130 cc overnight  5/5 IP, Q, H, TA, EHL, GS bilaterally  SILT L3-S1 bilaterally  Radial pulse 2+      Labs:  .  Visit Vitals  /71   Pulse 95   Temp 98.6 °F (37 °C) (Oral)   Resp 17   Ht 5' 6\" (1.676 m)   Wt 104.2 kg (229 lb 11.5 oz)   LMP 08/23/2023   SpO2 92%   BMI 37.08 kg/m²       .  WBC (K/mcL)   Date Value   09/15/2023 8.5     RBC (mil/mcL)   Date Value   09/15/2023 3.45 (L)     HCT (%)   Date Value   09/15/2023 29.7 (L)     HGB (g/dL)   Date Value   09/15/2023 10.3 (L)     PLT (K/mcL)   Date Value   09/15/2023 206     .  Sodium (mmol/L)   Date Value   09/15/2023 135     Potassium (mmol/L)   Date Value   09/15/2023 3.8     Chloride (mmol/L)   Date Value   09/15/2023 104     Glucose (mg/dL)   Date Value   09/15/2023 128 (H)     Calcium (mg/dL)   Date Value   09/15/2023 8.0 (L)     Carbon Dioxide (mmol/L)   Date Value   09/15/2023 28     BUN (mg/dL)   Date Value   09/15/2023 7     Creatinine (mg/dL)   Date Value   09/15/2023 0.71     .  Date 09/14/23 0700 - 09/15/23 0659 09/15/23 0700 - 09/16/23 0659   Shift 5677-8581 1939-8120 4362-5038 24 Hour Total 9137-4176 1186-6929 7055-8288 24 Hour Total   INTAKE   P.O.   600 600       Shift Total(mL/kg)   600(5.8) 600(5.8)       OUTPUT   Urine 900 7788 164 2550  800   800   Drains 230 60 20 310         Output (ml) (Drain 09/13/23 #1 Right Back Accordion (e.g. Hemovac, etc)) 230 60 20 310       Shift Total(mL/kg) 1130(10.8) 1460(14) 620(6) 3210(30.8) 800(7.7)   800(7.7)   Weight (kg) 104.2 104.2 104.2 104.2 104.2 104.2 104.2 104.2      Drain total: 230 first shift 9/14, 80 since.     Assessment/Plan:     31 year old female who presents s/p L5-S1 hardware removal with exploration of fusion with revision L5-S1 posterior spinal fusion with PLIF on 9/13/23 doing well postoperatively    - Multimodal Pain Control  - regular diet  - Weight Bearing Status: WBAT BLE  - PT/OT  - Antibiotics: none  - DVT Prophylaxis: hold  - Lines/drains: removed drain 9/15  - Dressings: maintain cdi  - Labs: stable  - Imaging: none  - Bowel Regimen: none  - Management per primary team    Disposition: pending further treatment, okay for DC from ortho perspective once med, therapy clears. possible DC 9/16    Douglas Newberry MD   Orthopaedic Surgery PGY 1  Available on [employer]  Contact \"Orthopedic Resident - [facility].\"           English

## 2024-05-06 NOTE — ED PROVIDER NOTE - CPE EDP CARDIAC NORM
One in room pt blood pressure was 222/96 (138)- Dr Condon made aware and decided not to proceed with procedure. Pt was made aware and brought back to pre-op room.    normal...

## 2024-09-24 NOTE — ED PROVIDER NOTE - CPE EDP GASTRO NORM
-On high dose Dulera, Dupixent, and albuterol  -Reports symptoms worse since COVID infection in June 2023, now symptoms have plateaued  -Still unable to return to her baseline exercise regimen due to increased dyspnea with exertion  -ACT score 16 today, partially controlled  -No exacerbations this past year    Plan:  -Repeat PFTs  -Talked about adding LAMA, but patient tried this in the past with no benefit  -Continue high-dose Dulera  -Continue Dupixent, can consider switching to different biologic  -Follow up in 3 months or sooner if needed   normal...

## 2025-06-16 NOTE — ED ADULT NURSE NOTE - NS ED NURSE DISCH DISPOSITION
Two patient identifiers used      43 year old patient last seen in the office on 7/17/2024 for ae and has next ae and mammogram on 10/17/2025.    Please review pended medication to get patient to her scheduled appointment    Admitted